# Patient Record
Sex: FEMALE | Race: WHITE | NOT HISPANIC OR LATINO | Employment: FULL TIME | URBAN - METROPOLITAN AREA
[De-identification: names, ages, dates, MRNs, and addresses within clinical notes are randomized per-mention and may not be internally consistent; named-entity substitution may affect disease eponyms.]

---

## 2017-03-13 ENCOUNTER — MEDICAL CORRESPONDENCE (OUTPATIENT)
Dept: HEALTH INFORMATION MANAGEMENT | Facility: CLINIC | Age: 47
End: 2017-03-13

## 2018-02-26 ENCOUNTER — OFFICE VISIT (OUTPATIENT)
Dept: NEUROLOGY | Facility: CLINIC | Age: 48
End: 2018-02-26
Payer: COMMERCIAL

## 2018-02-26 VITALS
HEART RATE: 83 BPM | TEMPERATURE: 98.7 F | OXYGEN SATURATION: 97 % | SYSTOLIC BLOOD PRESSURE: 112 MMHG | DIASTOLIC BLOOD PRESSURE: 73 MMHG | BODY MASS INDEX: 30.33 KG/M2 | WEIGHT: 171.2 LBS | HEIGHT: 63 IN | RESPIRATION RATE: 24 BRPM

## 2018-02-26 DIAGNOSIS — G71.039 LIMB-GIRDLE MUSCULAR DYSTROPHY (H): Primary | ICD-10-CM

## 2018-02-26 PROBLEM — E55.9 VITAMIN D DEFICIENCY: Status: ACTIVE | Noted: 2018-02-26

## 2018-02-26 RX ORDER — BACLOFEN 10 MG/1
10-20 TABLET ORAL 3 TIMES DAILY PRN
COMMUNITY
Start: 2017-04-14 | End: 2020-06-26

## 2018-02-26 RX ORDER — IBUPROFEN 200 MG
1-2 TABLET ORAL EVERY 4 HOURS PRN
COMMUNITY

## 2018-02-26 RX ORDER — LEVOTHYROXINE SODIUM 150 UG/1
175 TABLET ORAL DAILY
COMMUNITY
Start: 2017-08-02 | End: 2022-07-11

## 2018-02-26 RX ORDER — ACYCLOVIR 400 MG/1
400 TABLET ORAL DAILY PRN
COMMUNITY
Start: 2017-04-14 | End: 2022-02-08

## 2018-02-26 ASSESSMENT — PAIN SCALES - GENERAL: PAINLEVEL: NO PAIN (0)

## 2018-02-26 NOTE — MR AVS SNAPSHOT
After Visit Summary   2018    Caryn Elizalde    MRN: [id number]           Patient Information     Date Of Birth          1970        Visit Information        Provider Department      2018 4:30 PM Torey Diaz MD [facility] Neurology        Today's Diagnoses     Limb-girdle muscular dystrophy (H)    -  1       Follow-ups after your visit        Additional Services     PHYSICAL THERAPY REFERRAL       PT Clinician to Evaluate and treat patient in [facility] Clinic.                  Your next 10 appointments already scheduled     2019  4:00 PM CST   (Arrive by 3:45 PM)   Return Muscular Dystrophy with Torey Diaz MD   [facility] Neurology ([facility])    [address] [address] [phone]              Who to contact     Please call your clinic at [phone] to:    Ask questions about your health    Make or cancel appointments    Discuss your medicines    Learn about your test results    Speak to your doctor            Additional Information About Your Visit        MyChart Information     LiveNinja is an electronic gateway that provides easy, online access to your medical records. With LiveNinja, you can request a clinic appointment, read your test results, renew a prescription or communicate with your care team.     To sign up for Admaxim[employer] visit the website at [email or web address]   You will be asked to enter the access code listed below, as well as some personal information. Please follow the directions to create your username and password.     Your access code is: [email or web address]  Expires: 2018  6:30 AM     Your access code will  in 90 days. If you need help or a new code, please contact your [facility] or call [phone] for assistance.        Care EveryWhere ID     This is your Care EveryWhere ID. This could be used by other organizations to access your  "[facility] medical records  [id number]        Your Vitals Were     Pulse Temperature Respirations Height Pulse Oximetry Breastfeeding?    83 98.7  F (37.1  C) (Oral) 24 1.6 m (5' 3\") 97% No    BMI (Body Mass Index)                   30.33 kg/m2            Blood Pressure from Last 3 Encounters:   02/26/18 112/73   10/17/16 111/69   10/12/15 118/53    Weight from Last 3 Encounters:   02/26/18 77.7 kg (171 lb 3.2 oz)   10/17/16 80.9 kg (178 lb 4.8 oz)   10/12/15 74.7 kg (164 lb 11.2 oz)                 Today's Medication Changes          These changes are accurate as of 2/26/18  5:26 PM.  If you have any questions, ask your nurse or doctor.               These medicines have changed or have updated prescriptions.        Dose/Directions    baclofen 10 MG tablet   Commonly known as:  LIORESAL   This may have changed:  Another medication with the same name was removed. Continue taking this medication, and follow the directions you see here.   Changed by:  Torey Diaz MD        Dose:  10-20 mg   Take 10-20 mg by mouth 3 times daily as needed   Refills:  0       levothyroxine 175 MCG tablet   Commonly known as:  SYNTHROID/LEVOTHROID   This may have changed:  Another medication with the same name was removed. Continue taking this medication, and follow the directions you see here.   Changed by:  Torey Diaz MD        Dose:  175 mcg   Take 175 mcg by mouth daily   Refills:  0         Stop taking these medicines if you haven't already. Please contact your care team if you have questions.     ACYCLOVIR   Stopped by:  Torey Diaz MD                    Primary Care Provider Office Phone # Fax #    [facility] [phone]       [address]  [facility] [address]        Equal Access to Services     PARRISH BARROSO AH: Epi keyes Sopeña, waaxda luqadaha, qaybta kaalmada adeegyada, chen cody. So [facility] " [phone].    ATENCIÓN: Si chen hobson, tiene a jeffery disposición servicios gratuitos de asistencia lingüística. Ace fraser [phone].    We comply with applicable federal civil rights laws and [place] laws. We do not discriminate on the basis of race, color, national origin, age, disability, sex, sexual orientation, or gender identity.            Thank you!     Thank you for choosing [facility] NEUROLOGY  for your care. Our goal is always to provide you with excellent care. Hearing back from our patients is one way we can continue to improve our services. Please take a few minutes to complete the written survey that you may receive in the mail after your visit with us. Thank you!             Your Updated Medication List - Protect others around you: Learn how to safely use, store and throw away your medicines at www.disposemymeds.org.          This list is accurate as of 2/26/18  5:26 PM.  Always use your most recent med list.                   Brand Name Dispense Instructions for use Diagnosis    acyclovir 400 MG tablet    ZOVIRAX     Take 400 mg by mouth daily as needed        baclofen 10 MG tablet    LIORESAL     Take 10-20 mg by mouth 3 times daily as needed        ibuprofen 200 MG tablet    ADVIL/MOTRIN     Take 1-2 tablets by mouth every 4 hours as needed        levothyroxine 175 MCG tablet    SYNTHROID/LEVOTHROID     Take 175 mcg by mouth daily        PROBIOTIC & ACIDOPHILUS EX ST PO      Take 1 capsule by mouth daily

## 2018-02-26 NOTE — PROGRESS NOTES
[facility] [place]   Neurology [facility] Clinic Progress Note          Assessment and Plan:    Ms. Elizalde presents with limb-girdle muscular dystrophy, type 2B, ambulatory phase with mild progression but overall function is stable. Her balance has been problematic leading to falls and she has had an increase in significant tension type headaches that may be producing secondary migraines. We will refer her for physical therapy with special attention to balance and core stability strengthening. If physical therapy, and stretching/relaxation techniques do not help her tension type headaches we can discuss further treatment in the future. She has significant nausea and emesis with headaches and rescue medication would best be in the form of injectable or sublingual/dissolvable.     Plan:   -Refer to Physical Therapy  -Follow up in approximately 1 year.     Beulah Dinh DO  Neuromuscular Medicine Fellow    I personally examined this patient, reviewed vital signs and pertinent auxiliary test results.  This note details our findings, impression and plan that we formulated together.    I spent total of 15 minutes face-to-face with Caryn Elizalde during today's visit. Over 50% of this time was spent counseling the patient and coordinating care. See note for details.    Sincerely yours,      Torey Diaz MD  Pediatric Neurology  [phone]              Interval History:   Ms. 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  She continues to be ambulatory but with increased risk of falls.  She reports slight progression of weakness. She does not have a power mobility but uses a cane or a walker. She has balance difficulties. She feels her core is weaker. She is feels this leads to muscle tension in her neck in particular. About once a month she has debilitating headaches due to  "muscle neck tension. She has associated nausea and emesis, she reports preferring a dark and quiet room. She takes ibuprofen, alternates hot and cold packs, and uses tiger balm with eventual improvement of headache. She has noticed quite a bit of fatigue throughout her day.  She continues to work full-time, sometimes working >50 hours per week.  She does have the option of working from home.  She reports no difficulty with swallowing.  She reports no cardiac symptoms.  She reports no respiratory symptoms.     PMHX:   LGMD 2B  Right Facial Nerve Palsy (Congential)         Review of Systems:   The 10 point Review of Systems is negative other than noted in the HPI          Medications:     Current Outpatient Prescriptions   Medication     levothyroxine (SYNTHROID/LEVOTHROID) 175 MCG tablet     baclofen (LIORESAL) 10 MG tablet     acyclovir (ZOVIRAX) 400 MG tablet     ibuprofen (ADVIL/MOTRIN) 200 MG tablet     Probiotic Product (PROBIOTIC & ACIDOPHILUS EX ST PO)     No current facility-administered medications for this visit.              Physical Exam:   /73  Pulse 83  Temp 98.7  F (37.1  C) (Oral)  Resp 24  Ht 1.6 m (5' 3\")  Wt 77.7 kg (171 lb 3.2 oz)  SpO2 97%  Breastfeeding? No  BMI 30.33 kg/m2    Constitutional:   awake, alert, cooperative, no apparent distress, and appears stated age   Respiratory:   No increased work of breathing, no audible wheezing  Musculoskeletal:   No scoliosis, or foot abnormalities  Psych:   Congruent mood and affect, cooperative  Skin:   Mild erythematous changes over the dorsum of the foot and lower legs (recent sun exposure)    Neurologic:   Mental Status- Alert and oriented, normal language and fund of knowledge   CN- left facial nerve weakness. O/w CN grossly intact  Sensory- Intact to PP and vibration distally and proximally throughout  Coordination- no tremor or ataxia  Reflexes- 1+ biceps, otherwise absent  Motor- R/L  SA 4+/4+  EF 4+/4+  EE 4-/4+  Distal hand muscles " full strength  HF 4/4+  KE 2/2  KF 2/2  DF 4+/4-  PF 4-/4-  Gait: Wide-based steppage gait, unsteady.

## 2018-02-26 NOTE — NURSING NOTE
Chief Complaint   Patient presents with     RECHECK     [facility]- MUSCULAR DYSTROPHY, 1 YEAR F/U     Stephen Ott, CMA

## 2018-02-26 NOTE — LETTER
2/26/2018       RE: Caryn Elizalde  [id number]  [place] [address]     Dear Colleague,    Thank you for referring your patient, Caryn Elizalde, to the [facility] NEUROLOGY at [facility]. Please see a copy of my visit note below.    [facility]   Neurology [facility] Progress Note          Assessment and Plan:    Ms. Elizalde presents with limb-girdle muscular dystrophy, type 2B, ambulatory phase with mild progression but overall function is stable. Her balance has been problematic leading to falls and she has had an increase in significant tension type headaches that may be producing secondary migraines. We will refer her for physical therapy with special attention to balance and core stability strengthening. If physical therapy, and stretching/relaxation techniques do not help her tension type headaches we can discuss further treatment in the future. She has significant nausea and emesis with headaches and rescue medication would best be in the form of injectable or sublingual/dissolvable.     Plan:   -Refer to Physical Therapy  -Follow up in approximately 1 year.     Beulah Dinh DO  Neuromuscular Medicine Fellow    I personally examined this patient, reviewed vital signs and pertinent auxiliary test results.  This note details our findings, impression and plan that we formulated together.    I spent total of 15 minutes face-to-face with Caryn Elizalde during today's visit. Over 50% of this time was spent counseling the patient and coordinating care. See note for details.    Sincerely yours,      Torey Diaz MD  Pediatric Neurology  [phone]              Interval History:   Ms. 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  She continues to be ambulatory but with increased risk of falls.  She reports  "slight progression of weakness. She does not have a power mobility but uses a cane or a walker. She has balance difficulties. She feels her core is weaker. She is feels this leads to muscle tension in her neck in particular. About once a month she has debilitating headaches due to muscle neck tension. She has associated nausea and emesis, she reports preferring a dark and quiet room. She takes ibuprofen, alternates hot and cold packs, and uses tiger balm with eventual improvement of headache. She has noticed quite a bit of fatigue throughout her day.  She continues to work full-time, sometimes working >50 hours per week.  She does have the option of working from home.  She reports no difficulty with swallowing.  She reports no cardiac symptoms.  She reports no respiratory symptoms.     PMHX:   LGMD 2B  Right Facial Nerve Palsy (Congential)         Review of Systems:   The 10 point Review of Systems is negative other than noted in the HPI          Medications:     Current Outpatient Prescriptions   Medication     levothyroxine (SYNTHROID/LEVOTHROID) 175 MCG tablet     baclofen (LIORESAL) 10 MG tablet     acyclovir (ZOVIRAX) 400 MG tablet     ibuprofen (ADVIL/MOTRIN) 200 MG tablet     Probiotic Product (PROBIOTIC & ACIDOPHILUS EX ST PO)     No current facility-administered medications for this visit.              Physical Exam:   /73  Pulse 83  Temp 98.7  F (37.1  C) (Oral)  Resp 24  Ht 1.6 m (5' 3\")  Wt 77.7 kg (171 lb 3.2 oz)  SpO2 97%  Breastfeeding? No  BMI 30.33 kg/m2    Constitutional:   awake, alert, cooperative, no apparent distress, and appears stated age   Respiratory:   No increased work of breathing, no audible wheezing  Musculoskeletal:   No scoliosis, or foot abnormalities  Psych:   Congruent mood and affect, cooperative  Skin:   Mild erythematous changes over the dorsum of the foot and lower legs (recent sun exposure)    Neurologic:   Mental Status- Alert and oriented, normal language and " fund of knowledge   CN- left facial nerve weakness. O/w CN grossly intact  Sensory- Intact to PP and vibration distally and proximally throughout  Coordination- no tremor or ataxia  Reflexes- 1+ biceps, otherwise absent  Motor- R/L  SA 4+/4+  EF 4+/4+  EE 4-/4+  Distal hand muscles full strength  HF 4/4+  KE 2/2  KF 2/2  DF 4+/4-  PF 4-/4-  Gait: Wide-based steppage gait, unsteady.            Again, thank you for allowing me to participate in the care of your patient.      Sincerely,    Torey Diaz MD

## 2019-04-24 ENCOUNTER — DOCUMENTATION ONLY (OUTPATIENT)
Dept: CARE COORDINATION | Facility: CLINIC | Age: 49
End: 2019-04-24

## 2019-06-17 ENCOUNTER — OFFICE VISIT (OUTPATIENT)
Dept: NEUROLOGY | Facility: CLINIC | Age: 49
End: 2019-06-17
Payer: COMMERCIAL

## 2019-06-17 ENCOUNTER — RESEARCH ENCOUNTER (OUTPATIENT)
Dept: NEUROLOGY | Facility: CLINIC | Age: 49
End: 2019-06-17

## 2019-06-17 VITALS
BODY MASS INDEX: 33.18 KG/M2 | HEART RATE: 73 BPM | WEIGHT: 187.3 LBS | OXYGEN SATURATION: 96 % | SYSTOLIC BLOOD PRESSURE: 135 MMHG | DIASTOLIC BLOOD PRESSURE: 62 MMHG

## 2019-06-17 DIAGNOSIS — G71.033: Primary | ICD-10-CM

## 2019-06-17 ASSESSMENT — PAIN SCALES - GENERAL: PAINLEVEL: NO PAIN (0)

## 2019-06-17 ASSESSMENT — ENCOUNTER SYMPTOMS
STIFFNESS: 1
NECK PAIN: 1
BACK PAIN: 0
JOINT SWELLING: 0
ARTHRALGIAS: 1
MYALGIAS: 1
MUSCLE CRAMPS: 1
MUSCLE WEAKNESS: 1

## 2019-06-17 NOTE — NURSING NOTE
Chief Complaint   Patient presents with     RECHECK     [facility] RETURN - 1 YEAR FOLLOW UP       Sage Pedroza, EMT

## 2019-06-17 NOTE — PROGRESS NOTES
[facility] [place]   Neurology [facility] Progress Note          Assessment and Plan:    Ms. Elizalde presents with limb-girdle muscular dystrophy, type 2B, ambulatory phase with mild progression but overall function is stable. Her balance has been problematic leading to falls.     We will refer her for physical therapy with special attention to balance and core stability strengthening.    . She has significant nausea and emesis with headaches and rescue medication would best be in the form of injectable or sublingual/dissolvable.     Plan:   -Follow up in approximately 1 year.             Interval History:   Ms. 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  She continues to be ambulatory but with increased risk of falls.  She reports slight progression of weakness. She does not have a power mobility but uses a cane or a walker. She has balance difficulties. She feels her core is weaker. She is feels this leads to muscle tension in her neck in particular. About once a month she has debilitating headaches due to muscle neck tension. She has associated nausea and emesis, she reports preferring a dark and quiet room. She takes ibuprofen, alternates hot and cold packs, and uses tiger balm with eventual improvement of headache. She has noticed quite a bit of fatigue throughout her day.  She continues to work full-time, sometimes working >50 hours per week.  She does have the option of working from home.  She reports no difficulty with swallowing.  She reports no cardiac symptoms.  She reports no respiratory symptoms.       PMHX:   LGMD 2B  Right Facial Nerve Palsy (Congential)         Review of Systems:   The 10 point Review of Systems is negative other than noted in the HPI          Medications:     Current Outpatient Medications   Medication     acyclovir (ZOVIRAX) 400 MG tablet      baclofen (LIORESAL) 10 MG tablet     ibuprofen (ADVIL/MOTRIN) 200 MG tablet     levothyroxine (SYNTHROID/LEVOTHROID) 175 MCG tablet     Probiotic Product (PROBIOTIC & ACIDOPHILUS EX ST PO)     vitamin B complex with vitamin C (VITAMIN  B COMPLEX) tablet     No current facility-administered medications for this visit.              Physical Exam:   /62 (BP Location: Left arm, Patient Position: Sitting, Cuff Size: Adult Large)   Pulse 73   Wt 85 kg (187 lb 4.8 oz)   SpO2 96%   BMI 33.18 kg/m      Constitutional:   awake, alert, cooperative, no apparent distress, and appears stated age   Respiratory:   No increased work of breathing, no audible wheezing  Musculoskeletal:   No scoliosis, or foot abnormalities  Psych:   Congruent mood and affect, cooperative  Skin:   Mild erythematous changes over the dorsum of the foot and lower legs (recent sun exposure)    Neurologic:   Mental Status- Alert and oriented, normal language and fund of knowledge   CN- left facial nerve weakness. O/w CN grossly intact  Sensory- Intact to PP and vibration distally and proximally throughout  Coordination- no tremor or ataxia  Reflexes- 1+ biceps, otherwise absent  Motor- R/L  SA 4+/4+  EF 4+/4+  EE 4-/4+  Distal hand muscles full strength  HF 4/4+  KE 2/2  KF 2/2  DF 4+/4-  PF 4-/4-  Gait: Wide-based steppage gait, unsteady.        Answers for HPI/ROS submitted by the patient on 6/17/2019   General Symptoms: No  Skin Symptoms: No  HENT Symptoms: No  EYE SYMPTOMS: No  HEART SYMPTOMS: No  LUNG SYMPTOMS: No  INTESTINAL SYMPTOMS: No  URINARY SYMPTOMS: No  GYNECOLOGIC SYMPTOMS: No  BREAST SYMPTOMS: No  SKELETAL SYMPTOMS: Yes  BLOOD SYMPTOMS: No  NERVOUS SYSTEM SYMPTOMS: No  MENTAL HEALTH SYMPTOMS: No  Back pain: No  Muscle aches: Yes  Neck pain: Yes  Swollen joints: No  Joint pain: Yes  Bone pain: No  Muscle cramps: Yes  Muscle weakness: Yes  Joint stiffness: Yes  Bone fracture: No

## 2019-06-17 NOTE — PROGRESS NOTES
[facility] Neuromuscular Registry    IRB # [id number]  PI: Carlos Alberto Herron MD, PhD  : Geena Sanchez    Patient was approached for possible participation for the above study. The current approved IRB consent form was discussed and explained to the patient.  It was discussed that involvement with the study is voluntary and refusal to participate would not involve penalty or decrease benefits at which the patient is entitled, and the subject may discontinue his/her involvement at any time without penalty or loss in benefits. We also discussed that this study does not have follow up visits or procedures. Patient was informed that an additional contact might occur if data needed was not found in patient s medical record. The patient was given time to review and ask any questions about the consent. Patient was shown contact information for PI and study staff in consent for future questions. Patient verbalized understanding of consent and study by restating the purpose, procedures, duration, risk, confidentiality of PHI, and voluntarily participation. Patient printed, signed and dated the consent and HIPAA form prior to study involvement. A copy was given to the patient for their records.     Subject Consent/HIPAA : SIGNED ON 06.17.2019

## 2019-06-17 NOTE — LETTER
6/17/2019       RE: Caryn Elizalde  [address]  [place] [address]     Dear Colleague,    Thank you for referring your patient, Caryn Elizalde, to the [facility] NEUROLOGY at [facility]. Please see a copy of my visit note below.                 [facility]                  Muscular Dystrophy Clinic Note     Caryn Elizalde MRN# [id number]   YOB: 1970 Age: 49 year old      Date of Visit: Jun 17, 2019    [facility]      History is obtained from the patient, family and medical record       Interval Change:      Caryn Elizalde is a 49 year old female was seen and examined at the muscular dystrophy clinic on Jun 17, 2019 for evaluation of LGMD type 2B. She previously seen about a year ago. Since her previous visit she reports on some worsening of her symptoms. Since her last visit she started to work from home 1-2 times per week which helps her to conserve energy and minimize musculoskeletal discomfort.  She continues to have some headaches as well.              Allergies:    No Known Allergies          Medications:     Prescription Medications as of 6/17/2019       Rx Number Disp Refills Start End Last Dispensed Date Next Fill Date Owning Pharmacy    acyclovir (ZOVIRAX) 400 MG tablet    4/14/2017        Sig: Take 400 mg by mouth daily as needed    Class: Historical    Route: Oral    baclofen (LIORESAL) 10 MG tablet    4/14/2017        Sig: Take 10-20 mg by mouth 3 times daily as needed    Class: Historical    Route: Oral    ibuprofen (ADVIL/MOTRIN) 200 MG tablet            Sig: Take 1-2 tablets by mouth every 4 hours as needed    Class: Historical    Route: Oral    levothyroxine (SYNTHROID/LEVOTHROID) 175 MCG tablet    8/2/2017        Sig: Take 175 mcg by mouth daily    Class: Historical    Route: Oral    Probiotic Product (PROBIOTIC & ACIDOPHILUS EX ST PO)            Sig:  Take 1 capsule by mouth daily    Class: Historical    Route: Oral    vitamin B complex with vitamin C (VITAMIN  B COMPLEX) tablet            Sig: Take 1 tablet by mouth daily    Class: Historical    Route: Oral                Review of Systems:   Answers for HPI/ROS submitted by the patient on 6/17/2019   General Symptoms: No  Skin Symptoms: No  HENT Symptoms: No  EYE SYMPTOMS: No  HEART SYMPTOMS: No  LUNG SYMPTOMS: No  INTESTINAL SYMPTOMS: No  URINARY SYMPTOMS: No  GYNECOLOGIC SYMPTOMS: No  BREAST SYMPTOMS: No  SKELETAL SYMPTOMS: Yes  BLOOD SYMPTOMS: No  NERVOUS SYSTEM SYMPTOMS: No  MENTAL HEALTH SYMPTOMS: No  Back pain: No  Muscle aches: Yes  Neck pain: Yes  Swollen joints: No  Joint pain: Yes  Bone pain: No  Muscle cramps: Yes  Muscle weakness: Yes  Joint stiffness: Yes  Bone fracture: No           Physical Exam:     /62 (BP Location: Left arm, Patient Position: Sitting, Cuff Size: Adult Large)   Pulse 73   Wt 85 kg (187 lb 4.8 oz)   SpO2 96%   BMI 33.18 kg/m      Physical Exam:   General: NAD  Head: Normocephalic, atraumatic  Eyes: No conjunctival injection, no scleral icterus.  Mouth: No oral lesions, no erythema or exudate in the oropharynx  Respiratory: No increased work of breathing  Cardiovascular: No lower extremity edema  Abdomen: Soft, non-tender, without organomegaly.  Extremities: Warm, dry  Neurologic:   Mental Status- Alert and oriented, normal language and fund of knowledge   CN- left facial nerve weakness. O/w CN grossly intact  Sensory- Intact to PP and vibration distally and proximally throughout  Coordination- no tremor or ataxia  Reflexes- 1+ biceps, otherwise absent  Motor- R/L  SA 4+/4+  EF 4+/4+  EE 4-/4+  Distal hand muscles full strength  HF 4/4+  KE 2/2  KF 2/2  DF 4+/4-  PF 4-/4-  Gait: Wide-based steppage gait, unsteady.              Assessment and Recommendations:   Caryn Elizalde is a 49 year old woman who presents with limb-girdle muscular dystrophy, type 2B,  ambulatory phase with ongoing progression but overall function is stable.She continues to have issues related to balance increased risk for falls.       Recommendations:  - Continue current course.  Return to clinic in 1 year.     I spent total of 15 minutes in face-to-face during today's visit. Over 50% of this time was spent counseling the patient and coordinating care. See note for details.    Torey Diaz MD  [phone]       CC  Patient Care Team:  [facility], [facility] as PCP - General  SELF, REFERRED    Copy to patient  FERMIN MARTIN  [address] [address]

## 2019-06-18 NOTE — PROGRESS NOTES
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 She previously seen about a year ago. Since her previous visit she reports on some worsening of her symptoms. Since her last visit she started to work from home 1-2 times per week which helps her to conserve energy and minimize musculoskeletal discomfort.  She continues to have some headaches as well.              Allergies:    No Known Allergies          Medications:     Prescription Medications as of 6/17/2019       Rx Number Disp Refills Start End Last Dispensed Date Next Fill Date Owning Pharmacy    acyclovir (ZOVIRAX) 400 MG tablet    4/14/2017        Sig: Take 400 mg by mouth daily as needed    Class: Historical    Route: Oral    baclofen (LIORESAL) 10 MG tablet    4/14/2017        Sig: Take 10-20 mg by mouth 3 times daily as needed    Class: Historical    Route: Oral    ibuprofen (ADVIL/MOTRIN) 200 MG tablet            Sig: Take 1-2 tablets by mouth every 4 hours as needed    Class: Historical    Route: Oral    levothyroxine (SYNTHROID/LEVOTHROID) 175 MCG tablet    8/2/2017        Sig: Take 175 mcg by mouth daily    Class: Historical    Route: Oral    Probiotic Product (PROBIOTIC & ACIDOPHILUS EX ST PO)            Sig: Take 1 capsule by mouth daily    Class: Historical    Route: Oral    vitamin B complex with vitamin C (VITAMIN  B COMPLEX) tablet            Sig: Take 1 tablet by mouth daily    Class: Historical    Route: Oral                Review of Systems:   Answers for HPI/ROS submitted by the  patient on 6/17/2019   General Symptoms: No  Skin Symptoms: No  HENT Symptoms: No  EYE SYMPTOMS: No  HEART SYMPTOMS: No  LUNG SYMPTOMS: No  INTESTINAL SYMPTOMS: No  URINARY SYMPTOMS: No  GYNECOLOGIC SYMPTOMS: No  BREAST SYMPTOMS: No  SKELETAL SYMPTOMS: Yes  BLOOD SYMPTOMS: No  NERVOUS SYSTEM SYMPTOMS: No  MENTAL HEALTH SYMPTOMS: No  Back pain: No  Muscle aches: Yes  Neck pain: Yes  Swollen joints: No  Joint pain: Yes  Bone pain: No  Muscle cramps: Yes  Muscle weakness: Yes  Joint stiffness: Yes  Bone fracture: No           Physical Exam:     /62 (BP Location: Left arm, Patient Position: Sitting, Cuff Size: Adult Large)   Pulse 73   Wt 85 kg (187 lb 4.8 oz)   SpO2 96%   BMI 33.18 kg/m     Physical Exam:   General: NAD  Head: Normocephalic, atraumatic  Eyes: No conjunctival injection, no scleral icterus.  Mouth: No oral lesions, no erythema or exudate in the oropharynx  Respiratory: No increased work of breathing  Cardiovascular: No lower extremity edema  Abdomen: Soft, non-tender, without organomegaly.  Extremities: Warm, dry  Neurologic:   Mental Status- Alert and oriented, normal language and fund of knowledge   CN- left facial nerve weakness. O/w CN grossly intact  Sensory- Intact to PP and vibration distally and proximally throughout  Coordination- no tremor or ataxia  Reflexes- 1+ biceps, otherwise absent  Motor- R/L  SA 4+/4+  EF 4+/4+  EE 4-/4+  Distal hand muscles full strength  HF 4/4+  KE 2/2  KF 2/2  DF 4+/4-  PF 4-/4-  Gait: Wide-based steppage gait, unsteady.              Assessment and Recommendations:   Caryn Elizalde is a 49 year old woman who presents with limb-girdle muscular dystrophy, type 2B, ambulatory phase with ongoing progression but overall function is stable.She continues to have issues related to balance increased risk for falls.       Recommendations:  - Continue current course.  Return to clinic in 1 year.     I spent total of 15 minutes in face-to-face during today's  visit. Over 50% of this time was spent counseling the patient and coordinating care. See note for details.    Torey Diaz MD  [phone]       CC  Patient Care Team:  Clinic, [facility] as PCP - General  SELF, REFERRED    Copy to patient  FERMIN MARTIN  [address] [place] [id number]

## 2019-07-29 ENCOUNTER — OFFICE VISIT - HEALTHEAST (OUTPATIENT)
Dept: FAMILY MEDICINE | Facility: CLINIC | Age: 49
End: 2019-07-29

## 2019-07-29 ENCOUNTER — COMMUNICATION - HEALTHEAST (OUTPATIENT)
Dept: FAMILY MEDICINE | Facility: CLINIC | Age: 49
End: 2019-07-29

## 2019-07-29 DIAGNOSIS — Z13.29 SCREENING FOR ENDOCRINE, NUTRITIONAL, METABOLIC AND IMMUNITY DISORDER: ICD-10-CM

## 2019-07-29 DIAGNOSIS — K76.0 FATTY LIVER: ICD-10-CM

## 2019-07-29 DIAGNOSIS — G43.829 MENSTRUAL MIGRAINE WITHOUT STATUS MIGRAINOSUS, NOT INTRACTABLE: ICD-10-CM

## 2019-07-29 DIAGNOSIS — Z13.21 SCREENING FOR ENDOCRINE, NUTRITIONAL, METABOLIC AND IMMUNITY DISORDER: ICD-10-CM

## 2019-07-29 DIAGNOSIS — Z13.228 SCREENING FOR ENDOCRINE, NUTRITIONAL, METABOLIC AND IMMUNITY DISORDER: ICD-10-CM

## 2019-07-29 DIAGNOSIS — G71.039 LIMB-GIRDLE MUSCULAR DYSTROPHY (H): ICD-10-CM

## 2019-07-29 DIAGNOSIS — Z11.4 SCREENING FOR HIV WITHOUT PRESENCE OF RISK FACTORS: ICD-10-CM

## 2019-07-29 DIAGNOSIS — R74.01 ELEVATED TRANSAMINASE MEASUREMENT: ICD-10-CM

## 2019-07-29 DIAGNOSIS — B00.9 HERPES SIMPLEX TYPE 1 INFECTION: ICD-10-CM

## 2019-07-29 DIAGNOSIS — E03.9 HYPOTHYROIDISM, UNSPECIFIED TYPE: ICD-10-CM

## 2019-07-29 DIAGNOSIS — Z13.0 SCREENING FOR ENDOCRINE, NUTRITIONAL, METABOLIC AND IMMUNITY DISORDER: ICD-10-CM

## 2019-07-29 DIAGNOSIS — Z00.00 VISIT FOR PREVENTIVE HEALTH EXAMINATION: ICD-10-CM

## 2019-07-29 LAB
ALBUMIN SERPL-MCNC: 4.1 G/DL (ref 3.5–5)
ALP SERPL-CCNC: 63 U/L (ref 45–120)
ALT SERPL W P-5'-P-CCNC: 74 U/L (ref 0–45)
ANION GAP SERPL CALCULATED.3IONS-SCNC: 9 MMOL/L (ref 5–18)
AST SERPL W P-5'-P-CCNC: 62 U/L (ref 0–40)
BASOPHILS # BLD AUTO: 0 THOU/UL (ref 0–0.2)
BASOPHILS NFR BLD AUTO: 1 % (ref 0–2)
BILIRUB SERPL-MCNC: 0.3 MG/DL (ref 0–1)
BUN SERPL-MCNC: 9 MG/DL (ref 8–22)
CALCIUM SERPL-MCNC: 9.5 MG/DL (ref 8.5–10.5)
CHLORIDE BLD-SCNC: 105 MMOL/L (ref 98–107)
CHOLEST SERPL-MCNC: 182 MG/DL
CO2 SERPL-SCNC: 24 MMOL/L (ref 22–31)
CREAT SERPL-MCNC: 0.57 MG/DL (ref 0.6–1.1)
EOSINOPHIL # BLD AUTO: 0.1 THOU/UL (ref 0–0.4)
EOSINOPHIL NFR BLD AUTO: 2 % (ref 0–6)
ERYTHROCYTE [DISTWIDTH] IN BLOOD BY AUTOMATED COUNT: 11.4 % (ref 11–14.5)
FASTING STATUS PATIENT QL REPORTED: NORMAL
GFR SERPL CREATININE-BSD FRML MDRD: >60 ML/MIN/1.73M2
GLUCOSE BLD-MCNC: 84 MG/DL (ref 70–125)
HBA1C MFR BLD: 5 % (ref 3.5–6)
HCT VFR BLD AUTO: 40.8 % (ref 35–47)
HDLC SERPL-MCNC: 68 MG/DL
HGB BLD-MCNC: 14.1 G/DL (ref 12–16)
HIV 1+2 AB+HIV1 P24 AG SERPL QL IA: NEGATIVE
LDLC SERPL CALC-MCNC: 103 MG/DL
LYMPHOCYTES # BLD AUTO: 2.3 THOU/UL (ref 0.8–4.4)
LYMPHOCYTES NFR BLD AUTO: 42 % (ref 20–40)
MCH RBC QN AUTO: 33 PG (ref 27–34)
MCHC RBC AUTO-ENTMCNC: 34.6 G/DL (ref 32–36)
MCV RBC AUTO: 95 FL (ref 80–100)
MONOCYTES # BLD AUTO: 0.2 THOU/UL (ref 0–0.9)
MONOCYTES NFR BLD AUTO: 4 % (ref 2–10)
NEUTROPHILS # BLD AUTO: 2.7 THOU/UL (ref 2–7.7)
NEUTROPHILS NFR BLD AUTO: 51 % (ref 50–70)
PLATELET # BLD AUTO: 369 THOU/UL (ref 140–440)
PMV BLD AUTO: 6.8 FL (ref 7–10)
POTASSIUM BLD-SCNC: 4.3 MMOL/L (ref 3.5–5)
PROT SERPL-MCNC: 6.7 G/DL (ref 6–8)
RBC # BLD AUTO: 4.28 MILL/UL (ref 3.8–5.4)
SODIUM SERPL-SCNC: 138 MMOL/L (ref 136–145)
T3 SERPL-MCNC: 62 NG/DL (ref 45–175)
T4 FREE SERPL-MCNC: 1.2 NG/DL (ref 0.7–1.8)
TRIGL SERPL-MCNC: 56 MG/DL
TSH SERPL DL<=0.005 MIU/L-ACNC: 4.65 UIU/ML (ref 0.3–5)
WBC: 5.4 THOU/UL (ref 4–11)

## 2019-07-29 ASSESSMENT — MIFFLIN-ST. JEOR: SCORE: 1423.38

## 2019-07-30 LAB
HCV AB SERPL QL IA: NEGATIVE
THYROID PEROXIDASE ANTIBODIES - HISTORICAL: 1617.2 IU/ML (ref 0–5.6)

## 2019-07-31 ENCOUNTER — COMMUNICATION - HEALTHEAST (OUTPATIENT)
Dept: FAMILY MEDICINE | Facility: CLINIC | Age: 49
End: 2019-07-31

## 2019-08-05 ENCOUNTER — COMMUNICATION - HEALTHEAST (OUTPATIENT)
Dept: FAMILY MEDICINE | Facility: CLINIC | Age: 49
End: 2019-08-05

## 2019-08-05 DIAGNOSIS — G43.829 MENSTRUAL MIGRAINE WITHOUT STATUS MIGRAINOSUS, NOT INTRACTABLE: ICD-10-CM

## 2019-10-15 ENCOUNTER — AMBULATORY - HEALTHEAST (OUTPATIENT)
Dept: MULTI SPECIALTY CLINIC | Facility: CLINIC | Age: 49
End: 2019-10-15

## 2019-10-15 LAB — PAP SMEAR - HIM PATIENT REPORTED: NORMAL

## 2019-12-23 ENCOUNTER — COMMUNICATION - HEALTHEAST (OUTPATIENT)
Dept: FAMILY MEDICINE | Facility: CLINIC | Age: 49
End: 2019-12-23

## 2019-12-23 DIAGNOSIS — B00.9 HERPES SIMPLEX TYPE 1 INFECTION: ICD-10-CM

## 2019-12-23 DIAGNOSIS — G43.829 MENSTRUAL MIGRAINE WITHOUT STATUS MIGRAINOSUS, NOT INTRACTABLE: ICD-10-CM

## 2020-02-17 ENCOUNTER — COMMUNICATION - HEALTHEAST (OUTPATIENT)
Dept: SCHEDULING | Facility: CLINIC | Age: 50
End: 2020-02-17

## 2020-02-17 DIAGNOSIS — G43.829 MENSTRUAL MIGRAINE WITHOUT STATUS MIGRAINOSUS, NOT INTRACTABLE: ICD-10-CM

## 2020-03-05 ENCOUNTER — OFFICE VISIT - HEALTHEAST (OUTPATIENT)
Dept: FAMILY MEDICINE | Facility: CLINIC | Age: 50
End: 2020-03-05

## 2020-03-05 DIAGNOSIS — H72.92 ACUTE OTITIS MEDIA OF LEFT EAR WITH PERFORATION: ICD-10-CM

## 2020-03-05 DIAGNOSIS — H66.92 ACUTE OTITIS MEDIA OF LEFT EAR WITH PERFORATION: ICD-10-CM

## 2020-03-05 DIAGNOSIS — Z96.22 HISTORY OF TYMPANOSTOMY TUBE PLACEMENT: ICD-10-CM

## 2020-03-05 DIAGNOSIS — G51.0 FACIAL PALSY: ICD-10-CM

## 2020-03-05 ASSESSMENT — MIFFLIN-ST. JEOR: SCORE: 1412.89

## 2020-03-18 ENCOUNTER — RECORDS - HEALTHEAST (OUTPATIENT)
Dept: LAB | Facility: HOSPITAL | Age: 50
End: 2020-03-18

## 2020-03-18 LAB
BASOPHILS # BLD AUTO: 0.1 THOU/UL (ref 0–0.2)
BASOPHILS NFR BLD AUTO: 1 % (ref 0–2)
CK SERPL-CCNC: 1643 U/L (ref 30–190)
EOSINOPHIL # BLD AUTO: 0.1 THOU/UL (ref 0–0.4)
EOSINOPHIL NFR BLD AUTO: 2 % (ref 0–6)
ERYTHROCYTE [DISTWIDTH] IN BLOOD BY AUTOMATED COUNT: 12.9 % (ref 11–14.5)
ERYTHROCYTE [SEDIMENTATION RATE] IN BLOOD BY WESTERGREN METHOD: 12 MM/HR (ref 0–20)
HCT VFR BLD AUTO: 41.2 % (ref 35–47)
HGB BLD-MCNC: 14.4 G/DL (ref 12–16)
IRON SERPL-MCNC: 115 UG/DL (ref 42–175)
LYMPHOCYTES # BLD AUTO: 1.9 THOU/UL (ref 0.8–4.4)
LYMPHOCYTES NFR BLD AUTO: 34 % (ref 20–40)
MCH RBC QN AUTO: 32.2 PG (ref 27–34)
MCHC RBC AUTO-ENTMCNC: 35 G/DL (ref 32–36)
MCV RBC AUTO: 92 FL (ref 80–100)
MONOCYTES # BLD AUTO: 0.4 THOU/UL (ref 0–0.9)
MONOCYTES NFR BLD AUTO: 7 % (ref 2–10)
NEUTROPHILS # BLD AUTO: 3 THOU/UL (ref 2–7.7)
NEUTROPHILS NFR BLD AUTO: 56 % (ref 50–70)
PLATELET # BLD AUTO: 395 THOU/UL (ref 140–440)
PMV BLD AUTO: 9.3 FL (ref 8.5–12.5)
RBC # BLD AUTO: 4.47 MILL/UL (ref 3.8–5.4)
TSH SERPL DL<=0.005 MIU/L-ACNC: 3.63 UIU/ML (ref 0.3–5)
WBC: 5.5 THOU/UL (ref 4–11)

## 2020-03-31 ENCOUNTER — COMMUNICATION - HEALTHEAST (OUTPATIENT)
Dept: FAMILY MEDICINE | Facility: CLINIC | Age: 50
End: 2020-03-31

## 2020-03-31 DIAGNOSIS — B00.9 HERPES SIMPLEX TYPE 1 INFECTION: ICD-10-CM

## 2020-05-10 ENCOUNTER — COMMUNICATION - HEALTHEAST (OUTPATIENT)
Dept: FAMILY MEDICINE | Facility: CLINIC | Age: 50
End: 2020-05-10

## 2020-05-10 DIAGNOSIS — Z13.228 SCREENING FOR ENDOCRINE, NUTRITIONAL, METABOLIC AND IMMUNITY DISORDER: ICD-10-CM

## 2020-05-10 DIAGNOSIS — Z13.0 SCREENING FOR ENDOCRINE, NUTRITIONAL, METABOLIC AND IMMUNITY DISORDER: ICD-10-CM

## 2020-05-10 DIAGNOSIS — E03.9 HYPOTHYROIDISM, UNSPECIFIED TYPE: ICD-10-CM

## 2020-05-10 DIAGNOSIS — Z13.29 SCREENING FOR ENDOCRINE, NUTRITIONAL, METABOLIC AND IMMUNITY DISORDER: ICD-10-CM

## 2020-05-10 DIAGNOSIS — Z13.21 SCREENING FOR ENDOCRINE, NUTRITIONAL, METABOLIC AND IMMUNITY DISORDER: ICD-10-CM

## 2020-06-01 ENCOUNTER — HOSPITAL ENCOUNTER (OUTPATIENT)
Dept: MAMMOGRAPHY | Facility: CLINIC | Age: 50
Discharge: HOME | End: 2020-06-01
Attending: FAMILY MEDICINE

## 2020-06-01 DIAGNOSIS — Z12.31 VISIT FOR SCREENING MAMMOGRAM: ICD-10-CM

## 2020-06-02 ENCOUNTER — RECORDS - HEALTHEAST (OUTPATIENT)
Dept: RADIOLOGY | Facility: CLINIC | Age: 50
End: 2020-06-02

## 2020-06-02 ENCOUNTER — RECORDS - HEALTHEAST (OUTPATIENT)
Dept: ADMINISTRATIVE | Facility: OTHER | Age: 50
End: 2020-06-02

## 2020-06-24 ENCOUNTER — TELEPHONE (OUTPATIENT)
Dept: NEUROLOGY | Facility: CLINIC | Age: 50
End: 2020-06-24

## 2020-06-24 NOTE — TELEPHONE ENCOUNTER
Topiramate 25mg tabs 2 in the AM and 2 HS (started in march) she is having side affects which include constipation 8-9 day spurts and then follows with diarrhea for several days, along with seriously difficulty sleeping. States she can never feel like its a deep sleep and has gotten worse and worse. Last menstrual cycle created more migraines even at this current dose. She is wondering how to taper. Appt scheduled.    Elias Granados on 6/24/2020 at 10:37 AM

## 2020-06-24 NOTE — TELEPHONE ENCOUNTER
Pt lm re medication side effects and possible change. She is due for a follow up. I called patient, and had her schedule a visit with .

## 2020-06-25 NOTE — TELEPHONE ENCOUNTER
Please call, She can decrease topiramate to 1 tab BID for 3 days, then one tab once a day until our appt on 7-1-20. Thanks.

## 2020-06-26 VITALS — WEIGHT: 167.4 LBS | HEIGHT: 64 IN | BODY MASS INDEX: 28.58 KG/M2

## 2020-06-26 PROBLEM — R26.9 GAIT ABNORMALITY: Status: ACTIVE | Noted: 2018-12-24

## 2020-06-26 PROBLEM — D12.6 ADENOMATOUS POLYP OF COLON: Status: ACTIVE | Noted: 2018-08-07

## 2020-06-26 PROBLEM — G43.719 INTRACTABLE CHRONIC MIGRAINE WITHOUT AURA: Status: ACTIVE | Noted: 2020-06-26

## 2020-06-26 PROBLEM — G43.829 MENSTRUAL MIGRAINE WITHOUT STATUS MIGRAINOSUS, NOT INTRACTABLE: Status: ACTIVE | Noted: 2019-07-29

## 2020-06-26 PROBLEM — Z72.0 TOBACCO USER: Status: ACTIVE | Noted: 2018-08-07

## 2020-06-26 PROBLEM — E66.9 OBESITY: Status: ACTIVE | Noted: 2020-06-26

## 2020-06-26 PROBLEM — B00.9 HERPES SIMPLEX TYPE 1 INFECTION: Status: ACTIVE | Noted: 2019-07-29

## 2020-06-26 RX ORDER — RIZATRIPTAN BENZOATE 10 MG/1
1 TABLET ORAL PRN
COMMUNITY
Start: 2020-06-12 | End: 2020-07-01

## 2020-06-26 RX ORDER — TOPIRAMATE 25 MG/1
50 TABLET, FILM COATED ORAL 2 TIMES DAILY
COMMUNITY
Start: 2020-03-26 | End: 2021-09-23

## 2020-06-26 ASSESSMENT — MIFFLIN-ST. JEOR: SCORE: 1351.38

## 2020-06-26 NOTE — TELEPHONE ENCOUNTER
Spoke with patient and relayed message. She verbalized understanding with no further questions   Elias Granados on 6/26/2020 at 2:26 PM

## 2020-06-26 NOTE — NURSING NOTE
Chief Complaint   Patient presents with     Follow Up     Discuss migraines and possible medication changes (EMR)      Video visit [email or web address]  Elais Granados on 6/26/2020 at 2:34 PM

## 2020-07-01 ENCOUNTER — OFFICE VISIT (OUTPATIENT)
Dept: NEUROLOGY | Facility: CLINIC | Age: 50
End: 2020-07-01
Payer: COMMERCIAL

## 2020-07-01 DIAGNOSIS — G43.719 INTRACTABLE CHRONIC MIGRAINE WITHOUT AURA AND WITHOUT STATUS MIGRAINOSUS: Primary | ICD-10-CM

## 2020-07-01 PROCEDURE — 99213 OFFICE O/P EST LOW 20 MIN: CPT | Mod: 95 | Performed by: PSYCHIATRY & NEUROLOGY

## 2020-07-01 RX ORDER — RIZATRIPTAN BENZOATE 10 MG/1
10 TABLET ORAL PRN
Qty: 12 TABLET | Refills: 11 | Status: SHIPPED | OUTPATIENT
Start: 2020-07-01 | End: 2021-08-16

## 2020-07-01 NOTE — PATIENT INSTRUCTIONS
Patient Education     About Migraine Headaches  What is a migraine headache?  A migraine is a special kind of headache. It can last a for hours or days. It may cause intense pain. You may also feel sick to your stomach or have eye problems.  What causes it?  We don't know the exact cause. They may be caused by a problem with blood flow to the brain. Or they may happen when brain chemicals don't stay balanced. You are more likely to get a migraine when:    You are under stress    You are tired    You eat some kinds of foods, such as wine, cheese, chocolate or chemicals added to foods    You are around bright lights  Women are more likely to have migraines than men. Sometimes the headaches happen around the time a woman has her period. Or they may happen when a woman is taking hormone pills.   Migraines can run in families.  What are symptoms?  Before a migraine, you may:    Not feel well    Lose part of your vision    See bright spots    See zigzags in front of your eyes  Most of the time, these problems go away when the headache starts. When you have a migraine, you may:    Have a headache that throbs or pounds (you may feel the pain more on one side of your head, or your whole head may hurt)    Be very sensitive to light    Have blurry vision    Vomit (throw up) or have nausea (feel sick to your stomach)    Have numbness or tingling in your face or arm  How is it diagnosed?  Your care team will ask you about your symptoms and medical history. They will examine you.   It may help to keep a headache diary. You should write down:    The date and time of each headache    How long it lasts    The type of pain (is it dull or sharp? Does it throb? Do you feel pressure?)    Where it hurts (for example, scalp, eyes, temples, neck)    What symptoms you had before the headache started    What you ate and drank before the headache    If you used cigarettes, caffeine, alcohol or soda    What time you went to bed the night  before, and what time you woke up that day    If you are a woman, you should also note if you are using birth control pills or taking other female hormones  If you just recently started having headaches, your care team may suggest tests to look for the causes of your symptoms. You may need a brain scan or MRI.  How is it treated?    You may need to take medicines to keep migraines from getting worse once they start. Take these medicines as soon as you can when you start to have signs of a migraine.    You may need to take another medicine every day to stop migraines from coming so often. The medicine can help prevent very bad migraines.    You may need to try a medicine for a few weeks to see if it works. Be sure to keep your follow-up appointments with your care team to see if a medicine is working and what you should try next. Talk to your care team about what is best for you. You may have many choices.  How long will it last?  The headache may last from a few hours to a few days. You may get migraines for the rest of your life. Most of the time, migraines happen less often as you get older.  How can I take care of myself?  As soon as the migraine starts:    Take a pain reliever. Ask your care team what medicine you should take.    Rest in a quiet, dark room until you start to feel better.    Don't drive a car while you have a migraine.    See your care team if your headaches get worse or if they don't get better when you take medicine for them. It may take a few visits to find the best way to control your headaches.  How can I prevent migraines?    Eat regular, healthy meals. Don't go too long without eating. Stay away from foods that seem to cause headaches. Watch out for:  ? Wine, chandan and beer  ? Cheese  ? Aged, canned and processed meats  ? Bread made with yeast  ? Foods with cheese, chocolate or nuts    Don't use medicines that can cause headaches. Ask your care team about this. You may need to stop using  birth control or hormone pills.    Don't smoke cigarettes    Get plenty of sleep every day    Lower your stress. Find time to relax, rest and have fun in your life.  When should I call my care team?  Call your care team right away if you have symptoms that you don't usually get with migraines or you have other unusual symptoms, such as:    Problems talking or your speech is slurred    A weak arm or leg that you can't move in a normal way    A fever higher than 100 F (37.8 C)    Stiff neck    Vomiting that lasts for a few hours  For more information  National Headache Foundation (NHF)  www.headaches.org.  For informational purposes only. Not to replace the advice of your health care provider.   Copyright   2011 [employer]. All rights reserved. Clinically reviewed by [facility]. [employer] 190857 - 08/18.  For informational purposes only. Not to replace the advice of your health care provider.  Copyright   2018 [employer]. All rights reserved.

## 2020-07-01 NOTE — PROGRESS NOTES
[facility]  [place]    Caryn Elizalde MRN# [id number]   Age: 50 year old YOB: 1970               Assessment and Plan:   Assessment:   Migraine headaches  Side effects from topiramate        Plan:   I renewed the rizatriptan prescription at 10 mg per dose.  She will continue using that as abortive therapy and see how the next couple months ago.  I gave her the okay to use ibuprofen or Excedrin in addition to the rizatriptan.  She has not enthusiastic about starting a new preventative medication at this time.  We did discuss the options of Depakote or 1 of the new CGRP antibody medications.  She will follow-up with us in a couple months if she would like to try different preventative medication.               Chief Complaint/HPI:     I spoke to Caryn for a telephone follow-up today with her permission.  We were unable to establish a video link through the ironically named [employer] system.  I first met Caryn back in March.  At that time she was having significant migraine headaches.  MRI of the brain with and without contrast was unremarkable.  I increased her rizatriptan to 10 mg per dose.  This did prove more effective than the 5 mg dose.  I also started her on topiramate.  Unfortunately she developed significant side effects from that including bowel problems (constipation alternating with diarrhea), difficulty sleeping, severe paresthesias.  These side effects persisted despite drinking a lot of water.  About a week ago I gave her instructions for tapering down on the topiramate.  She is now down to 25 mg once a day and already is feeling significantly better though not quite back to 100%.  The topiramate was effective for preventing the migraine headaches.  She would occasionally get a lesser headache.  The migraines have tended to cluster around her menstrual cycle.          Past Medical History:    has a past medical history of Herpes, Hypothyroidism, and Migraines.           Past Surgical History:    has a past surgical history that includes biopsy and GYN surgery.          Social History:     Social History     Tobacco Use     Smoking status: Current Every Day Smoker     Packs/day: 0.50     Years: 37.00     Pack years: 18.50     Types: Cigarettes     Start date: 11/17/2015     Smokeless tobacco: Never Used   Substance Use Topics     Alcohol use: Yes     Alcohol/week: 1.0 standard drinks     Types: 1 Standard drinks or equivalent per week     Comment: MONTHLY             Family History:     Family History   Problem Relation Age of Onset     No Known Problems Mother      Alcoholism Father                 Allergies:     Allergies   Allergen Reactions     Amoxicillin Rash     Other reaction(s): hives             Medications:     Current Outpatient Medications:      ibuprofen (ADVIL/MOTRIN) 200 MG tablet, Take 1-2 tablets by mouth every 4 hours as needed, Disp: , Rfl:      levothyroxine (SYNTHROID/LEVOTHROID) 150 MCG tablet, Take 175 mcg by mouth daily , Disp: , Rfl:      Probiotic Product (PROBIOTIC & ACIDOPHILUS EX ST PO), Take 1 capsule by mouth daily, Disp: , Rfl:      rizatriptan (MAXALT) 10 MG tablet, Take 1 tablet (10 mg) by mouth as needed for migraine May repeat after 2 hours if needed, maximum 2 doses in 24 hours., Disp: 12 tablet, Rfl: 11     topiramate (TOPAMAX) 25 MG tablet, Take 50 mg by mouth 2 times daily, Disp: , Rfl:      vitamin B complex with vitamin C (VITAMIN  B COMPLEX) tablet, Take 1 tablet by mouth daily, Disp: , Rfl:      acyclovir (ZOVIRAX) 400 MG tablet, Take 400 mg by mouth daily as needed, Disp: , Rfl:            Review of Systems:   No difficulty breathing or swallowing, no double vision             Physical Exam:   Awake and alert with no aphasia no dysarthria  Speech is clear and coherent  Attention is fine              Data:       EXAM: MRI HEAD W/O and WITH CONTRAST  LOCATION: [facility]  DATE/TIME: 3/23/2020 8:15  AM    IMPRESSION:  1.  No mass, hemorrhage, or recent infarct identified.  2.  Left mastoid effusion.        23 minutes were spent on the phone today.    Torey Maldonado MD

## 2020-07-01 NOTE — LETTER
7/1/2020         RE: Caryn Elizalde  [address]  [place] [address]        Dear Colleague,    Thank you for referring your patient, Caryn Elizalde, to the [facility] NEUROLOGY [place]. Please see a copy of my visit note below.    [facility]  [place]    Caryn Elizalde MRN# [id number]   Age: 50 year old YOB: 1970               Assessment and Plan:   Assessment:   Migraine headaches  Side effects from topiramate        Plan:   I renewed the rizatriptan prescription at 10 mg per dose.  She will continue using that as abortive therapy and see how the next couple months ago.  She has not enthusiastic about starting a new preventative medication at this time.  We did discuss the options of Depakote or 1 of the new CGRP antibody medications.               Chief Complaint/HPI:     I spoke to Caryn for a telephone follow-up today with her permission.  We were unable to establish a video link through the ironically named [employer] system.  I first met Caryn back in March.  At that time she was having significant migraine headaches.  MRI of the brain with and without contrast was unremarkable.  I increased her rizatriptan to 10 mg per dose.  This did prove more effective than the 5 mg dose.  I also started her on topiramate.  Unfortunately she developed significant side effects from that including bowel problems (constipation alternating with diarrhea), difficulty sleeping, severe paresthesias.  These side effects persisted despite drinking a lot of water.  About a week ago I gave her instructions for tapering down on the topiramate.  She is now down to 25 mg once a day and already is feeling significantly better though not quite back to 100%.  The topiramate was effective for preventing the migraine headaches.  She would occasionally get a lesser headache.  The migraines have tended to cluster around her menstrual cycle.          Past Medical History:     has a past medical history of Herpes, Hypothyroidism, and Migraines.          Past Surgical History:    has a past surgical history that includes biopsy and GYN surgery.          Social History:     Social History     Tobacco Use     Smoking status: Current Every Day Smoker     Packs/day: 0.50     Years: 37.00     Pack years: 18.50     Types: Cigarettes     Start date: 11/17/2015     Smokeless tobacco: Never Used   Substance Use Topics     Alcohol use: Yes     Alcohol/week: 1.0 standard drinks     Types: 1 Standard drinks or equivalent per week     Comment: MONTHLY             Family History:     Family History   Problem Relation Age of Onset     No Known Problems Mother      Alcoholism Father                 Allergies:     Allergies   Allergen Reactions     Amoxicillin Rash     Other reaction(s): hives             Medications:     Current Outpatient Medications:      ibuprofen (ADVIL/MOTRIN) 200 MG tablet, Take 1-2 tablets by mouth every 4 hours as needed, Disp: , Rfl:      levothyroxine (SYNTHROID/LEVOTHROID) 150 MCG tablet, Take 175 mcg by mouth daily , Disp: , Rfl:      Probiotic Product (PROBIOTIC & ACIDOPHILUS EX ST PO), Take 1 capsule by mouth daily, Disp: , Rfl:      rizatriptan (MAXALT) 10 MG tablet, Take 1 tablet (10 mg) by mouth as needed for migraine May repeat after 2 hours if needed, maximum 2 doses in 24 hours., Disp: 12 tablet, Rfl: 11     topiramate (TOPAMAX) 25 MG tablet, Take 50 mg by mouth 2 times daily, Disp: , Rfl:      vitamin B complex with vitamin C (VITAMIN  B COMPLEX) tablet, Take 1 tablet by mouth daily, Disp: , Rfl:      acyclovir (ZOVIRAX) 400 MG tablet, Take 400 mg by mouth daily as needed, Disp: , Rfl:            Review of Systems:   No difficulty breathing or swallowing, no double vision             Physical Exam:   Awake and alert with no aphasia no dysarthria  Speech is clear and coherent  Attention is fine              Data:       EXAM: MRI HEAD W/O and WITH CONTRAST  LOCATION:  [facility]  DATE/TIME: 3/23/2020 8:15 AM    IMPRESSION:  1.  No mass, hemorrhage, or recent infarct identified.  2.  Left mastoid effusion.        23 minutes were spent on the phone today.    Torey Maldonado MD             Again, thank you for allowing me to participate in the care of your patient.        Sincerely,        Torey Maldonado MD

## 2020-07-13 ENCOUNTER — MYC MEDICAL ADVICE (OUTPATIENT)
Dept: NEUROLOGY | Facility: CLINIC | Age: 50
End: 2020-07-13

## 2020-07-13 DIAGNOSIS — G71.039 LGMD (LIMB-GIRDLE MUSCULAR DYSTROPHY) (H): Primary | ICD-10-CM

## 2020-11-05 ENCOUNTER — COMMUNICATION - HEALTHEAST (OUTPATIENT)
Dept: FAMILY MEDICINE | Facility: CLINIC | Age: 50
End: 2020-11-05

## 2020-11-05 DIAGNOSIS — B00.9 HERPES SIMPLEX TYPE 1 INFECTION: ICD-10-CM

## 2020-11-08 ENCOUNTER — HEALTH MAINTENANCE LETTER (OUTPATIENT)
Age: 50
End: 2020-11-08

## 2021-02-24 ENCOUNTER — COMMUNICATION - HEALTHEAST (OUTPATIENT)
Dept: FAMILY MEDICINE | Facility: CLINIC | Age: 51
End: 2021-02-24

## 2021-02-24 DIAGNOSIS — B00.9 HERPES SIMPLEX TYPE 1 INFECTION: ICD-10-CM

## 2021-02-25 ENCOUNTER — COMMUNICATION - HEALTHEAST (OUTPATIENT)
Dept: FAMILY MEDICINE | Facility: CLINIC | Age: 51
End: 2021-02-25

## 2021-02-25 DIAGNOSIS — B00.9 HERPES SIMPLEX TYPE 1 INFECTION: ICD-10-CM

## 2021-03-04 ENCOUNTER — OFFICE VISIT - HEALTHEAST (OUTPATIENT)
Dept: FAMILY MEDICINE | Facility: CLINIC | Age: 51
End: 2021-03-04

## 2021-03-04 DIAGNOSIS — Z13.228 SCREENING FOR ENDOCRINE, NUTRITIONAL, METABOLIC AND IMMUNITY DISORDER: ICD-10-CM

## 2021-03-04 DIAGNOSIS — G51.0 LEFT-SIDED BELL'S PALSY: ICD-10-CM

## 2021-03-04 DIAGNOSIS — Z13.29 SCREENING FOR ENDOCRINE, NUTRITIONAL, METABOLIC AND IMMUNITY DISORDER: ICD-10-CM

## 2021-03-04 DIAGNOSIS — B00.9 HERPES SIMPLEX TYPE 1 INFECTION: ICD-10-CM

## 2021-03-04 DIAGNOSIS — Z13.21 SCREENING FOR ENDOCRINE, NUTRITIONAL, METABOLIC AND IMMUNITY DISORDER: ICD-10-CM

## 2021-03-04 DIAGNOSIS — E06.3 HYPOTHYROIDISM DUE TO HASHIMOTO'S THYROIDITIS: ICD-10-CM

## 2021-03-04 DIAGNOSIS — Z00.00 VISIT FOR PREVENTIVE HEALTH EXAMINATION: ICD-10-CM

## 2021-03-04 DIAGNOSIS — Z13.0 SCREENING FOR ENDOCRINE, NUTRITIONAL, METABOLIC AND IMMUNITY DISORDER: ICD-10-CM

## 2021-03-04 LAB
ALBUMIN SERPL-MCNC: 4 G/DL (ref 3.5–5)
ALP SERPL-CCNC: 65 U/L (ref 45–120)
ALT SERPL W P-5'-P-CCNC: 76 U/L (ref 0–45)
ANION GAP SERPL CALCULATED.3IONS-SCNC: 8 MMOL/L (ref 5–18)
AST SERPL W P-5'-P-CCNC: 60 U/L (ref 0–40)
BILIRUB SERPL-MCNC: 0.4 MG/DL (ref 0–1)
BUN SERPL-MCNC: 5 MG/DL (ref 8–22)
CALCIUM SERPL-MCNC: 8.9 MG/DL (ref 8.5–10.5)
CHLORIDE BLD-SCNC: 105 MMOL/L (ref 98–107)
CHOLEST SERPL-MCNC: 189 MG/DL
CO2 SERPL-SCNC: 28 MMOL/L (ref 22–31)
CREAT SERPL-MCNC: 0.53 MG/DL (ref 0.6–1.1)
FASTING STATUS PATIENT QL REPORTED: YES
GFR SERPL CREATININE-BSD FRML MDRD: >60 ML/MIN/1.73M2
GLUCOSE BLD-MCNC: 85 MG/DL (ref 70–125)
HBA1C MFR BLD: 4.9 %
HDLC SERPL-MCNC: 71 MG/DL
LDLC SERPL CALC-MCNC: 97 MG/DL
POTASSIUM BLD-SCNC: 4.5 MMOL/L (ref 3.5–5)
PROT SERPL-MCNC: 6.6 G/DL (ref 6–8)
SODIUM SERPL-SCNC: 141 MMOL/L (ref 136–145)
T3 SERPL-MCNC: 48 NG/DL (ref 45–175)
T4 FREE SERPL-MCNC: 1.2 NG/DL (ref 0.7–1.8)
TRIGL SERPL-MCNC: 106 MG/DL
TSH SERPL DL<=0.005 MIU/L-ACNC: 4.53 UIU/ML (ref 0.3–5)

## 2021-03-05 ENCOUNTER — COMMUNICATION - HEALTHEAST (OUTPATIENT)
Dept: FAMILY MEDICINE | Facility: CLINIC | Age: 51
End: 2021-03-05

## 2021-03-16 ENCOUNTER — COMMUNICATION - HEALTHEAST (OUTPATIENT)
Dept: FAMILY MEDICINE | Facility: CLINIC | Age: 51
End: 2021-03-16

## 2021-03-22 ENCOUNTER — IMMUNIZATION (OUTPATIENT)
Dept: NURSING | Facility: CLINIC | Age: 51
End: 2021-03-22
Payer: COMMERCIAL

## 2021-03-22 PROCEDURE — 91300 PR COVID VAC PFIZER DIL RECON 30 MCG/0.3 ML IM: CPT

## 2021-03-22 PROCEDURE — 0001A PR COVID VAC PFIZER DIL RECON 30 MCG/0.3 ML IM: CPT

## 2021-04-12 ENCOUNTER — IMMUNIZATION (OUTPATIENT)
Dept: NURSING | Facility: CLINIC | Age: 51
End: 2021-04-12
Attending: INTERNAL MEDICINE
Payer: COMMERCIAL

## 2021-04-12 PROCEDURE — 0002A PR COVID VAC PFIZER DIL RECON 30 MCG/0.3 ML IM: CPT

## 2021-04-12 PROCEDURE — 91300 PR COVID VAC PFIZER DIL RECON 30 MCG/0.3 ML IM: CPT

## 2021-05-09 ENCOUNTER — COMMUNICATION - HEALTHEAST (OUTPATIENT)
Dept: FAMILY MEDICINE | Facility: CLINIC | Age: 51
End: 2021-05-09

## 2021-05-09 DIAGNOSIS — Z13.29 SCREENING FOR ENDOCRINE, NUTRITIONAL, METABOLIC AND IMMUNITY DISORDER: ICD-10-CM

## 2021-05-09 DIAGNOSIS — Z13.228 SCREENING FOR ENDOCRINE, NUTRITIONAL, METABOLIC AND IMMUNITY DISORDER: ICD-10-CM

## 2021-05-09 DIAGNOSIS — E03.9 HYPOTHYROIDISM, UNSPECIFIED TYPE: ICD-10-CM

## 2021-05-09 DIAGNOSIS — Z13.0 SCREENING FOR ENDOCRINE, NUTRITIONAL, METABOLIC AND IMMUNITY DISORDER: ICD-10-CM

## 2021-05-09 DIAGNOSIS — Z13.21 SCREENING FOR ENDOCRINE, NUTRITIONAL, METABOLIC AND IMMUNITY DISORDER: ICD-10-CM

## 2021-05-30 NOTE — TELEPHONE ENCOUNTER
Medication Question or Clarification  Who is calling: Pharmacy: [facility]  What medication are you calling about? (include dose and sig)   acyclovir (ZOVIRAX) 400 MG tablet 30 tablet 2 7/29/2019 8/5/2019    Sig - Route: Take 1 tablet (400 mg total) by mouth 3 (three) times a day for 7 days. - Oral      Who prescribed the medication?: iTffany Lorenzo MD  What is your question/concern?: Pharmacy states that the quantity of tablets and days dose not mach in the prescription . Please advise.  Pharmacy: [facility] [id number]  Okay to leave a detailed message?: No  Site CMT - Please call the pharmacy to obtain any additional needed information.

## 2021-05-30 NOTE — PROGRESS NOTES
FEMALE ADULT PREVENTIVE EXAM    CHIEF COMPLAINT:  Female preventive exam.    SUBJECTIVE:  Caryn Elizalde is a 49 y.o. female who presents for her routine physical exam.    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  MDRO from ear infection few days ago, ear tubes were placed.  Most recently has been experiencing migraine headaches, described as throbbing headache starting last day of her.,  Lasting about 2 to 3 days, associate with vomiting, photophobia phonophobia totally relieved with Excedrin, ibuprofen on several different over-the-counter medication and treatment.      GYNE HISTORY  Menses: yes  Sexually Active: na  Contraception: no  Last Pap: 2018@ GYN  Abnormal Pap: no  Vaginal Discharge: no      She  has no past medical history on file.    Lab Results   Component Value Date    WBC 5.4 07/29/2019    HGB 14.1 07/29/2019    HCT 40.8 07/29/2019    MCV 95 07/29/2019     07/29/2019     07/29/2019    K 4.3 07/29/2019    BUN 9 07/29/2019     Lab Results   Component Value Date    CHOL 182 07/29/2019    HDL 68 07/29/2019    LDLCALC 103 07/29/2019    TRIG 56 07/29/2019     Lab Results   Component Value Date    TSH 4.65 07/29/2019     BP Readings from Last 3 Encounters:   07/29/19 110/65       Surgeries:  No past surgical history on file.    Family History:  Her family history is not on file.    Social History:  She  reports that she has been smoking cigarettes.  She has been smoking about 0.25 packs per day. She has never used smokeless tobacco. She reports that she drinks alcohol. She reports that she does not use drugs.    Medications:    Current Outpatient Medications:      acyclovir (ZOVIRAX) 400 MG tablet, 400 mg orally 3 times daily for 5 to 10 days, Disp: 30 tablet, Rfl: 2     ibuprofen (ADVIL,MOTRIN) 200 MG tablet, Take  1-2 tablets by mouth., Disp: , Rfl:      levothyroxine (SYNTHROID, LEVOTHROID) 150 MCG tablet, Take 1 tablet (150 mcg total) by mouth daily., Disp: 90 tablet, Rfl: 2     multivitamin, stress formula (STRESS FORMULA) Tab, Take 1 tablet by mouth., Disp: , Rfl:      rizatriptan (MAXALT) 5 MG tablet, Take 1 tablet (5 mg total) by mouth as needed for migraine. May repeat in 2 hours if needed; MAX 15 mg/day, Disp: 10 tablet, Rfl: 3  HELD MEDICATIONS: None.    Allergies:  No latex allergies.  Allergies   Allergen Reactions     Amoxicillin Rash            RISK BEHAVIOR & HEALTH HABITS  Self Breast Exam: yes.  Regular Exercise: yes.  Balanced diet: yes.  Seat Belt Use: yes  Calcium intake/Osteoporosis prevention: yes.    Guns: NA  Sun Screen: YES  Dental Care: YES    REVIEW OF SYSTEMS:  Complete head to toe review of systems is otherwise negative except as above.    OBJECTIVE:  VITAL SIGNS:    Vitals:    07/29/19 1035   BP: 110/65   Pulse: 83   SpO2: 97%     GENERAL:  Patient alert, in no acute distress.  EYES: PERRLA. Extraoccular movements intact, pupils equal, reactive to light and accommodation.  Normal conjunctiva and lids.    ENT:  Hearing grossly normal.  Normal appearance to ears and nose.  Bilateral TM s, external canals, oropharynx normal. Normal lips, gums and teeth.    NECK:  Supple, without thyromegaly or mass, no adenopathies.  RESP:  Clear to auscultation without crackles, wheezes or distress.  Normal respiratory effort.   CV:  Regular rate and rhythm without murmurs, rubs or gallops.  Normal pedal pulses.  No varicosities or edema.  ABDOMEN:  Soft, non-tender, without hepatosplenomegaly, masses, or hernias.   BREASTS:  declined   :  declined   Rectal: declined   LYMPHATIC: Normal palpation of neck.  No lymphadenopathy.  No bruising.  NEURO:  CN II-XII intact, motor & sensory function all intact.  DTR and reflexes normal.  PSYCHIATRIC:  Alert & oriented with normal mood and affect.  Good judgment and  insight.  SKIN:  Normal inspection and palpation.  MUSCULOSKELETAL: Normal gait and station.  - Spine / Ribs / Pelvis: Normal inspection, ROM, stability and strength: Spine, Head, Neck, Upper and Lower Extremities.    ASSESSMENT & PLAN  Caryn was seen today for establish care, annual exam, migraine and menopause.    Diagnoses and all orders for this visit:    Visit for preventive health examination    Screening for endocrine, nutritional, metabolic and immunity disorder  -     levothyroxine (SYNTHROID, LEVOTHROID) 150 MCG tablet; Take 1 tablet (150 mcg total) by mouth daily.  -     Glycosylated Hemoglobin A1c  -     HM1(CBC and Differential)  -     Comprehensive Metabolic Panel  -     Lipid Cascade  -     HM1 (CBC with Diff)    Herpes simplex type 1 infection  -     Discontinue: acyclovir (ZOVIRAX) 400 MG tablet; Take 1 tablet (400 mg total) by mouth 3 (three) times a day for 7 days.  -     acyclovir (ZOVIRAX) 400 MG tablet; 400 mg orally 3 times daily for 5 to 10 days    Limb-girdle muscular dystrophy (H)    Fatty liver  -     US Abdomen Limited; Future    Hypothyroidism, unspecified type  -     levothyroxine (SYNTHROID, LEVOTHROID) 150 MCG tablet; Take 1 tablet (150 mcg total) by mouth daily.  -     Thyroid Cascade  -     Thyroid Peroxidase Antibody  -     T4, Free  -     T3, Total    Screening for HIV without presence of risk factors  -     HIV Antigen/Antibody Screening Cascade  -     Hepatitis C Antibody (Anti-HCV)    Menstrual migraine without status migrainosus, not intractable  -     rizatriptan (MAXALT) 5 MG tablet; Take 1 tablet (5 mg total) by mouth as needed for migraine. May repeat in 2 hours if needed; MAX 15 mg/day    Elevated transaminase measurement  -     US Abdomen Limited; Future    Herpes controlled with acyclovir, menstrual migraine, maxalt  prescribed, possible side effect discussed, follow-up in a few weeks if still not improving.  General  Immunizations reviewed and updated .  Alcohol  use, safety and moderation discussed.  Recommended adequate calcium, vitamin D intake/osteoporosis prevention.  Discussed colon cancer screening at age 50, 45 if -American.  Diet and exercise reviewed, including goal of aerobic exercise 30-90 minutes most days of the week, moderation of portions sizes, avoiding eating out and fast food and increase in fruits and vegetables.  Discussed safe sex practices.    Discussed & recommended seat belt (& motorcycle helmet) use.  Discussed & recommended smoke detector.  Discussed sun protection.  Discussed weight management.  Discussed self breast exam, screening mammogram timing.  I have had an Advance Directives discussion with the patient.  The following high BMI interventions were performed this visit: encouragement to exercise and weight loss from baseline weight    Tiffany Lorenzo MD

## 2021-05-31 NOTE — TELEPHONE ENCOUNTER
Left message to call back for: Patient  Information to relay to patient:  Please see message below from Dr. Lorenzo and relay to pt when she calls back. Thanks!

## 2021-05-31 NOTE — TELEPHONE ENCOUNTER
Patient Returning Call  Reason for call:  Message from clinic  Information relayed to patient:  Message from Tiffany Lorenzo MD sent at 7/31/2019  6:29 PM CDT -----  Liver function test mildly elevated, suspecting from fatty liver disease, will place an order to get an ultrasound to rule out any other conditions.   Patient has additional questions:  Yes  If YES, what are your questions/concerns:  1)  Patient always has abnormal liver function test due to her muscular dystrophy.  2)  She is asking about result of thyroid testing.  Okay to leave a detailed message?: Yes

## 2021-05-31 NOTE — TELEPHONE ENCOUNTER
Dr. Lorenzo,    I had called Caryn to get the US scheduled and she is still waiting for a phone call from because she feels that she does not need this US. After you talk to her let me know what you guys have decided on so I can get her scheduled or remove the orders.    Thanks,  Betty

## 2021-05-31 NOTE — TELEPHONE ENCOUNTER
----- Message from Tiffany Lorenzo MD sent at 7/31/2019  6:29 PM CDT -----  Liver function test mildly elevated, suspecting from fatty liver disease, will place an order to get an ultrasound to rule out any other conditions.

## 2021-05-31 NOTE — TELEPHONE ENCOUNTER
Spoke with pt to see what additional questions she had about thyroid labs.  It looks like her thyroid peroxidase is extremely elevated at 1,617.2.  Please advise on what this means.    She also wanted you to be aware that she has had the workup in the past at [facility] in regards to the elevated liver tests.  She did have an ultrasound in the past as well and her provider said that this is probably due to her muscular dystrophy.  She stated that unless there is something else in her labs that would suggest something other than this, she would like to bring this to your attention that she probably doesn't need to complete this.    In regards to her migraine medication.  She started to get the symptoms of her migraine late last week, on Saturday morning she took 1 of the rizitriptan and slept for a little while, the pain went from 7-8 down to 1-2.  Then later in the day the symptoms came back again, she took another pill and again the same thing happened.  She has taken 2 of the rizitriptan daily since Saturday.  She states that this happens every month with her menstrual cycles.  She is wondering if this is ok to take this often for this many days?  Or is there another medication that she should try?    Pt is aware you are out of clinic today and is fine to wait until you are back tomorrow to have this questions addressed.

## 2021-06-03 VITALS — BODY MASS INDEX: 31.03 KG/M2 | WEIGHT: 181.75 LBS | HEIGHT: 64 IN

## 2021-06-04 VITALS
RESPIRATION RATE: 16 BRPM | DIASTOLIC BLOOD PRESSURE: 80 MMHG | HEIGHT: 64 IN | TEMPERATURE: 98.6 F | SYSTOLIC BLOOD PRESSURE: 134 MMHG | BODY MASS INDEX: 30.63 KG/M2 | WEIGHT: 179.44 LBS | HEART RATE: 84 BPM

## 2021-06-04 NOTE — TELEPHONE ENCOUNTER
Refill Approved    Rx renewed per Medication Renewal Policy. Medication was last renewed on   acyclovir (ZOVIRAX) 400 MG tablet 30 tablet 2 7/30/2019     rizatriptan (MAXALT) 5 MG tablet 10 tablet 3 7/29/2019      Rona Cheema, [facility] Triage/Med Refill 12/25/2019     Requested Prescriptions   Pending Prescriptions Disp Refills     acyclovir (ZOVIRAX) 400 MG tablet [Pharmacy Med Name: ACYCLOVIR 400 MG TABLET] 30 tablet 2     Sig: TAKE 1 TABLET BY MOUTH 3 TIMES DAILY FOR 5-10 DAYS       Antivirals Refill Protocol Passed - 12/23/2019 10:26 AM        Passed - Renal function done in last year     Creatinine   Date Value Ref Range Status   07/29/2019 0.57 (L) 0.60 - 1.10 mg/dL Final             Passed - Visit with PCP or prescribing provider visit in past 12 months or next 3 months     Last office visit with prescriber/PCP: Visit date not found OR same dept: Visit date not found OR same specialty: Visit date not found  Last physical: 7/29/2019 Last MTM visit: Visit date not found   Next visit within 3 mo: Visit date not found  Next physical within 3 mo: Visit date not found  Prescriber OR PCP: Tiffany Lorenzo MD  Last diagnosis associated with med order: 1. Herpes simplex type 1 infection  - acyclovir (ZOVIRAX) 400 MG tablet [Pharmacy Med Name: ACYCLOVIR 400 MG TABLET]; TAKE 1 TABLET BY MOUTH 3 TIMES DAILY FOR 5-10 DAYS  Dispense: 30 tablet; Refill: 2    2. Menstrual migraine without status migrainosus, not intractable  - rizatriptan (MAXALT) 5 MG tablet [Pharmacy Med Name: RIZATRIPTAN 5 MG TABLET]; TAKE 1 TABLET BY MOUTH AS NEEDED FOR MIGRAINE. MAY REPEAT IN 2 HOURS IF NEEDED MAX 15 MG/DAY  Dispense: 10 tablet; Refill: 1    If protocol passes may refill for 12 months if within 3 months of last provider visit (or a total of 15 months).             Passed - Patient does not have active pregnancy episode        Passed - Patient has not had positive pregnancy test in last 280 days     No results found  for: HCGQUAL, PREGTESTUR, PREGSERUM, BHCG          rizatriptan (MAXALT) 5 MG tablet [Pharmacy Med Name: RIZATRIPTAN 5 MG TABLET] 10 tablet 1     Sig: TAKE 1 TABLET BY MOUTH AS NEEDED FOR MIGRAINE. MAY REPEAT IN 2 HOURS IF NEEDED MAX 15 MG/DAY       Triptans Refill Protocol Passed - 12/23/2019 10:26 AM        Passed - PCP or prescribing provider visit in past 12 months       Last office visit with prescriber/PCP: Visit date not found OR same dept: Visit date not found OR same specialty: Visit date not found  Last physical: 7/29/2019 Last MTM visit: Visit date not found   Next visit within 3 mo: Visit date not found  Next physical within 3 mo: Visit date not found  Prescriber OR PCP: Tiffany Lorenzo MD  Last diagnosis associated with med order: 1. Herpes simplex type 1 infection  - acyclovir (ZOVIRAX) 400 MG tablet [Pharmacy Med Name: ACYCLOVIR 400 MG TABLET]; TAKE 1 TABLET BY MOUTH 3 TIMES DAILY FOR 5-10 DAYS  Dispense: 30 tablet; Refill: 2    2. Menstrual migraine without status migrainosus, not intractable  - rizatriptan (MAXALT) 5 MG tablet [Pharmacy Med Name: RIZATRIPTAN 5 MG TABLET]; TAKE 1 TABLET BY MOUTH AS NEEDED FOR MIGRAINE. MAY REPEAT IN 2 HOURS IF NEEDED MAX 15 MG/DAY  Dispense: 10 tablet; Refill: 1    If protocol passes may refill for 12 months if within 3 months of last provider visit (or a total of 15 months).

## 2021-06-05 VITALS
DIASTOLIC BLOOD PRESSURE: 75 MMHG | OXYGEN SATURATION: 99 % | WEIGHT: 180 LBS | SYSTOLIC BLOOD PRESSURE: 116 MMHG | BODY MASS INDEX: 31.39 KG/M2 | RESPIRATION RATE: 16 BRPM | HEART RATE: 79 BPM

## 2021-06-06 NOTE — PROGRESS NOTES
"Assessment / Impression     1. Acute otitis media of left ear with perforation  cefdinir (OMNICEF) 300 MG capsule    Ambulatory referral to ENT    ciprofloxacin-dexamethasone (CIPRODEX) otic suspension    DISCONTINUED: ciprofloxacin-dexamethasone (CIPRODEX) otic suspension   2. History of tympanostomy tube placement  Ambulatory referral to ENT   3. Facial palsy           Plan:     She was given a prescription for Ceftin ear and Ciprodex.  I also put in a referral for her to see ENT again since this is a similar issue that she had in 2018 when she was treated with tympanostomy tube placement.  If symptoms do not improve she may return to the clinic.    Subjective:      HPI: Caryn Elizalde is a 49 y.o. female who has a history of facial palsy presents to the clinic complaining of drainage from her left ear that started this morning.  She denies feeling ill.  She denies having pain, but she has a history of facial palsy.  She has been feeling more tired lately.  She had an ear infection in 2008 which required tubes to be placed on the left side by ENT.      Review of Systems  Pertinent items are noted in HPI.       Objective:     /80 (Patient Site: Left Arm, Patient Position: Sitting, Cuff Size: Adult Regular)   Pulse 84   Temp 98.6  F (37  C) (Oral)   Resp 16   Ht 5' 4.25\" (1.632 m)   Wt 179 lb 7 oz (81.4 kg)   LMP 02/24/2020   Breastfeeding No   BMI 30.56 kg/m      General Appearance: Alert, cooperative, appears slightly fatigued.  Head: Normocephalic, without obvious abnormality, atraumatic.  Eyes: PERRL, conjunctiva/corneas clear, EOM's intact.  Ears: Right tympanic membrane appears clear.  There is purulent discharge present within the left canal.  The visualized portion of the left tympanic membrane is erythematous.  Throat: Clear.  No exudates.  Neck: Supple, symmetrical, trachea midline, no lymphadenopathy.  Lungs: Clear to auscultation bilaterally, respirations unlabored.  Heart:: Regular rate " and rhythm.  Extremities: Extremities normal, atraumatic, no cyanosis or edema.        No results found for this or any previous visit (from the past 168 hour(s)).

## 2021-06-06 NOTE — TELEPHONE ENCOUNTER
2-18-20  Hello just connecting back, I have called pt today 2-18-20 and LM for her to call [facility] for an appt   Thanks  Cristy

## 2021-06-06 NOTE — TELEPHONE ENCOUNTER
Pt was seen by Dr Lorenzo in July.  She has been having headaches.  She did see a OBGYN but that didn't help.  She would like Dr Lorenzo to refer her to the Neurologist that they discused at the last appointment.  Please contact pt if she needs to schedule another appointment.  thanks

## 2021-06-08 NOTE — TELEPHONE ENCOUNTER
Refill Approved    Rx renewed per Medication Renewal Policy. Medication was last renewed on 7/29/19.    Renee Workman, [facility] Triage/Med Refill 5/12/2020     Requested Prescriptions   Pending Prescriptions Disp Refills     levothyroxine (SYNTHROID, LEVOTHROID) 150 MCG tablet [Pharmacy Med Name: LEVOTHYROXINE 150 MCG TABLET] 90 tablet 2     Sig: TAKE 1 TABLET BY MOUTH EVERY DAY       Thyroid Hormones Protocol Passed - 5/10/2020 12:52 PM        Passed - Provider visit in past 12 months or next 3 months     Last office visit with prescriber/PCP: Visit date not found OR same dept: Visit date not found OR same specialty: 3/5/2020 Osman Palmer,   Last physical: 7/29/2019 Last MTM visit: Visit date not found   Next visit within 3 mo: Visit date not found  Next physical within 3 mo: Visit date not found  Prescriber OR PCP: Tiffany Lorenzo MD  Last diagnosis associated with med order: 1. Screening for endocrine, nutritional, metabolic and immunity disorder  - levothyroxine (SYNTHROID, LEVOTHROID) 150 MCG tablet [Pharmacy Med Name: LEVOTHYROXINE 150 MCG TABLET]; TAKE 1 TABLET BY MOUTH EVERY DAY  Dispense: 90 tablet; Refill: 2    2. Hypothyroidism, unspecified type  - levothyroxine (SYNTHROID, LEVOTHROID) 150 MCG tablet [Pharmacy Med Name: LEVOTHYROXINE 150 MCG TABLET]; TAKE 1 TABLET BY MOUTH EVERY DAY  Dispense: 90 tablet; Refill: 2    If protocol passes may refill for 12 months if within 3 months of last provider visit (or a total of 15 months).             Passed - TSH on file in past 12 months for patient age 12 & older     TSH   Date Value Ref Range Status   03/18/2020 3.63 0.30 - 5.00 uIU/mL Final

## 2021-06-12 NOTE — TELEPHONE ENCOUNTER
RN cannot approve Refill Request    RN can NOT refill this medication PCP messaged that patient is overdue for Labs. Last office visit: Visit date not found Last Physical: 7/29/2019 Last MTM visit: Visit date not found Last visit same specialty: 3/5/2020 Osman Palmer DO.  Next visit within 3 mo: Visit date not found  Next physical within 3 mo: Visit date not found      Felicia Narayan, [facility] Triage/Med Refill 11/7/2020    Requested Prescriptions   Pending Prescriptions Disp Refills     acyclovir (ZOVIRAX) 400 MG tablet [Pharmacy Med Name: ACYCLOVIR 400 MG TABLET] 30 tablet 1     Sig: TAKE 1 TABLET BY MOUTH 3 TIMES DAILY FOR 5-10 DAYS       Antivirals Refill Protocol Failed - 11/5/2020 11:45 AM        Failed - Renal function done in last year     Creatinine   Date Value Ref Range Status   07/29/2019 0.57 (L) 0.60 - 1.10 mg/dL Final             Failed - Visit with PCP or prescribing provider visit in past 12 months or next 3 months     Last office visit with prescriber/PCP: Visit date not found OR same dept: Visit date not found OR same specialty: 3/5/2020 Osman Palmer DO  Last physical: 7/29/2019 Last MTM visit: Visit date not found   Next visit within 3 mo: Visit date not found  Next physical within 3 mo: Visit date not found  Prescriber OR PCP: Tiffany Lorenzo MD  Last diagnosis associated with med order: 1. Herpes simplex type 1 infection  - acyclovir (ZOVIRAX) 400 MG tablet [Pharmacy Med Name: ACYCLOVIR 400 MG TABLET]; TAKE 1 TABLET BY MOUTH 3 TIMES DAILY FOR 5-10 DAYS  Dispense: 30 tablet; Refill: 1    If protocol passes may refill for 12 months if within 3 months of last provider visit (or a total of 15 months).             Passed - Patient does not have active pregnancy episode        Passed - Patient has not had positive pregnancy test in last 280 days     No results found for: HCGQUAL, PREGTESTUR, PREGSERUM, BHCG

## 2021-06-15 NOTE — TELEPHONE ENCOUNTER
RN cannot approve Refill Request    RN can NOT refill this medication PCP messaged that patient is overdue for Labs and Office Visit. Last office visit: Visit date not found Last Physical: 7/29/2019 Last MTM visit: Visit date not found Last visit same specialty: 3/5/2020 Osman Palmer DO.  Next visit within 3 mo: Visit date not found  Next physical within 3 mo: Visit date not found      Marie Marcelo, [facility] Triage/Med Refill 2/25/2021    Requested Prescriptions   Pending Prescriptions Disp Refills     acyclovir (ZOVIRAX) 400 MG tablet 30 tablet 1       Antivirals Refill Protocol Failed - 2/25/2021  5:56 PM        Failed - Renal function done in last year     Creatinine   Date Value Ref Range Status   07/29/2019 0.57 (L) 0.60 - 1.10 mg/dL Final             Failed - Visit with PCP or prescribing provider visit in past 12 months or next 3 months     Last office visit with prescriber/PCP: Visit date not found OR same dept: Visit date not found OR same specialty: 3/5/2020 Osman Palmer DO  Last physical: 7/29/2019 Last MTM visit: Visit date not found   Next visit within 3 mo: Visit date not found  Next physical within 3 mo: Visit date not found  Prescriber OR PCP: Tiffany Lorenzo MD  Last diagnosis associated with med order: 1. Herpes simplex type 1 infection  - acyclovir (ZOVIRAX) 400 MG tablet  Dispense: 30 tablet; Refill: 1    If protocol passes may refill for 12 months if within 3 months of last provider visit (or a total of 15 months).             Passed - Patient does not have active pregnancy episode        Passed - Patient has not had positive pregnancy test in last 280 days     No results found for: HCGQUAL, PREGTESTUR, PREGSERUM, BHCG

## 2021-06-15 NOTE — PROGRESS NOTES
"FEMALE ADULT PREVENTIVE EXAM    CHIEF COMPLAINT:  Female preventive exam.    SUBJECTIVE:  Caryn Elizalde is a 50 y.o. female who presents for her routine physical exam.    Patient would like to address the following concerns today: Topamax did cause some significant side effect included sleep difficulty, \"not thinking straight\", medication was then discontinued by neurologist.  Only taking Maxalt as needed, and 80 to 90% of the case medication that seems to help.  Migraine headache related to menstrual period, currently having less menstruation.  History of herpes infection, with Bell's palsy and occasional rash on the back, taking acyclovir as needed, also interested in getting the Shingrix vaccine today.  Muscular dystrophy, followed by neurologist has been stable she does AFOs in both feet for support.      GYNE HISTORY  Menses: yes  Sexually Active: NA  Contraception: No  Last Pap: 2019  Abnormal Pap: No  Vaginal Discharge: no      She  has no past medical history on file.    Lab Results   Component Value Date    WBC 5.5 03/18/2020    HGB 14.4 03/18/2020    HCT 41.2 03/18/2020    MCV 92 03/18/2020     03/18/2020     07/29/2019    K 4.3 07/29/2019    BUN 9 07/29/2019     Lab Results   Component Value Date    CHOL 182 07/29/2019    HDL 68 07/29/2019    LDLCALC 103 07/29/2019    TRIG 56 07/29/2019     Lab Results   Component Value Date    TSH 3.63 03/18/2020     BP Readings from Last 3 Encounters:   03/04/21 116/75   03/05/20 134/80   07/29/19 110/65       Surgeries:  No past surgical history on file.    Family History:  Her family history includes Breast cancer in her paternal aunt.    Social History:  She  reports that she has been smoking cigarettes. She has been smoking about 0.25 packs per day. She has never used smokeless tobacco. She reports current alcohol use. She reports that she does not use drugs.    Medications:    Current Outpatient Medications:      acyclovir (ZOVIRAX) 400 MG tablet, " 400 mg orally 3 times daily for 5 days, Disp: 30 tablet, Rfl: 6     levothyroxine (SYNTHROID, LEVOTHROID) 150 MCG tablet, TAKE 1 TABLET BY MOUTH EVERY DAY, Disp: 90 tablet, Rfl: 3     rizatriptan (MAXALT) 5 MG tablet, TAKE 1 TABLET BY MOUTH AS NEEDED FOR MIGRAINE. MAY REPEAT IN 2 HOURS IF NEEDED MAX 15 MG/DAY, Disp: 10 tablet, Rfl: 0     ciprofloxacin-dexamethasone (CIPRODEX) otic suspension, 4 drops into affected ear(s) twice daily x 10 days., Disp: 7.5 mL, Rfl: 0     ibuprofen (ADVIL,MOTRIN) 200 MG tablet, Take 1-2 tablets by mouth., Disp: , Rfl:      multivitamin, stress formula (STRESS FORMULA) Tab, Take 1 tablet by mouth., Disp: , Rfl:   HELD MEDICATIONS: None.    Allergies:  No latex allergies.  Allergies   Allergen Reactions     Amoxicillin Rash            RISK BEHAVIOR & HEALTH HABITS  Self Breast Exam: yes.  Regular Exercise: yes.  Balanced diet: yes.  Seat Belt Use: yes  Calcium intake/Osteoporosis prevention: yes.    Guns: NA  Sun Screen: YES  Dental Care: YES    REVIEW OF SYSTEMS:  Complete head to toe review of systems is otherwise negative except as above.    OBJECTIVE:  VITAL SIGNS:    Vitals:    03/04/21 0925   BP: 116/75   Pulse: 79   Resp: 16   SpO2: 99%     GENERAL:  Patient alert, in no acute distress.  EYES: PERRLA. Extraoccular movements intact, pupils equal, reactive to light and accommodation.  Normal conjunctiva and lids.    ENT:  Hearing grossly normal.  Normal appearance to ears and nose.  Bilateral TM s, external canals, oropharynx normal. Normal lips, gums and teeth.    NECK:  Supple, without thyromegaly or mass, no adenopathies.  RESP:  Clear to auscultation without crackles, wheezes or distress.  Normal respiratory effort.   CV:  Regular rate and rhythm without murmurs, rubs or gallops.  Normal pedal pulses.  No varicosities or edema.  ABDOMEN:  Soft, non-tender, without hepatosplenomegaly, masses, or hernias.   LYMPHATIC: Normal palpation of neck.  No lymphadenopathy.  No  bruising.  NEURO:  CN II-XII intact, motor & sensory function all intact.  DTR and reflexes normal.  PSYCHIATRIC:  Alert & oriented with normal mood and affect.  Good judgment and insight.  SKIN:  Normal inspection and palpation.  MUSCULOSKELETAL: Normal gait and station.  - Spine / Ribs / Pelvis: Normal inspection, ROM, stability and strength: Spine, Head, Neck, Upper and Lower Extremities.    ASSESSMENT & PLAN  Caryn was seen today for annual exam.    Diagnoses and all orders for this visit:    Visit for preventive health examination    Screening for endocrine, nutritional, metabolic and immunity disorder  -     Glycosylated Hemoglobin A1c  -     Comprehensive Metabolic Panel  -     Lipid Cascade    Hypothyroidism, unspecified type  -     Thyroid Cascade  -     T4, Free  -     T3, Total    Left-sided Bell's palsy    Herpes simplex type 1 infection  -     acyclovir (ZOVIRAX) 400 MG tablet; 400 mg orally 3 times daily for 5 days    Other orders  -     Varicella Zoster, Recombinant Vaccine IM; Standing  -     Varicella Zoster, Recombinant Vaccine IM    Hypothyroidism, will check thyroid panel today adjust medication accordingly.  History of Bell palsy from herpes infection, she takes acyclovir as needed, hopefully getting the Shingrix vaccine will help minimize flareup.  Migraine headaches, seems to be improving with Maxalt, she can takes 4-5 dose on average, hopefully not having her menstruation with minimize her headaches.  Has seen neurologist.  Biometric form completed today,, copy will be scan.  General  Immunizations reviewed and updated .  Alcohol use, safety and moderation discussed.  Recommended adequate calcium, vitamin D intake/osteoporosis prevention.  Discussed colon cancer screening at age 50, 45 if -American.  Diet and exercise reviewed, including goal of aerobic exercise 30-90 minutes most days of the week, moderation of portions sizes, avoiding eating out and fast food and increase in  fruits and vegetables.  Discussed safe sex practices.    Discussed & recommended seat belt (& motorcycle helmet) use.  Discussed & recommended smoke detector.  Discussed sun protection.  Discussed weight management.  Discussed self breast exam, screening mammogram timing.  The following high BMI interventions were performed this visit: encouragement to exercise and weight loss from baseline weight    Tiffany Lorenzo MD

## 2021-06-15 NOTE — TELEPHONE ENCOUNTER
RN cannot approve Refill Request    RN can NOT refill this medication PCP messaged that patient is overdue for Labs and Office Visit. Last office visit: Visit date not found Last Physical: 7/29/2019 Last MTM visit: Visit date not found Last visit same specialty: 3/5/2020 Osman Palmer DO.  Next visit within 3 mo: Visit date not found  Next physical within 3 mo: Visit date not found      Marie Marcelo, [facility] Triage/Med Refill 2/24/2021    Requested Prescriptions   Pending Prescriptions Disp Refills     acyclovir (ZOVIRAX) 400 MG tablet 30 tablet 1       Antivirals Refill Protocol Failed - 2/24/2021 10:58 AM        Failed - Renal function done in last year     Creatinine   Date Value Ref Range Status   07/29/2019 0.57 (L) 0.60 - 1.10 mg/dL Final             Failed - Visit with PCP or prescribing provider visit in past 12 months or next 3 months     Last office visit with prescriber/PCP: Visit date not found OR same dept: Visit date not found OR same specialty: 3/5/2020 Osmna Palmer DO  Last physical: 7/29/2019 Last MTM visit: Visit date not found   Next visit within 3 mo: Visit date not found  Next physical within 3 mo: Visit date not found  Prescriber OR PCP: Tiffany Lorenzo MD  Last diagnosis associated with med order: 1. Herpes simplex type 1 infection  - acyclovir (ZOVIRAX) 400 MG tablet  Dispense: 30 tablet; Refill: 1    If protocol passes may refill for 12 months if within 3 months of last provider visit (or a total of 15 months).             Passed - Patient does not have active pregnancy episode        Passed - Patient has not had positive pregnancy test in last 280 days     No results found for: HCGQUAL, PREGTESTUR, PREGSERUM, BHCG

## 2021-06-16 NOTE — TELEPHONE ENCOUNTER
Please look at what happened to this patient biometric form, was supposed to be faxed, keep a copy in file,and mail the  original to pt.  Thanks

## 2021-06-17 NOTE — TELEPHONE ENCOUNTER
Refill Approved    Rx renewed per Medication Renewal Policy. Medication was last renewed on 5/12/20.    Seferino Post, [facility] Triage/Med Refill 5/10/2021     Requested Prescriptions   Pending Prescriptions Disp Refills     levothyroxine (SYNTHROID, LEVOTHROID) 150 MCG tablet [Pharmacy Med Name: LEVOTHYROXINE 150 MCG TABLET] 90 tablet 3     Sig: TAKE 1 TABLET BY MOUTH EVERY DAY       Thyroid Hormones Protocol Passed - 5/9/2021  9:43 AM        Passed - Provider visit in past 12 months or next 3 months     Last office visit with prescriber/PCP: Visit date not found OR same dept: Visit date not found OR same specialty: 3/5/2020 Osman Palmer,   Last physical: 3/4/2021 Last MTM visit: Visit date not found   Next visit within 3 mo: Visit date not found  Next physical within 3 mo: Visit date not found  Prescriber OR PCP: Tiffany Lorenzo MD  Last diagnosis associated with med order: 1. Screening for endocrine, nutritional, metabolic and immunity disorder  - levothyroxine (SYNTHROID, LEVOTHROID) 150 MCG tablet [Pharmacy Med Name: LEVOTHYROXINE 150 MCG TABLET]; TAKE 1 TABLET BY MOUTH EVERY DAY  Dispense: 90 tablet; Refill: 3    2. Hypothyroidism, unspecified type  - levothyroxine (SYNTHROID, LEVOTHROID) 150 MCG tablet [Pharmacy Med Name: LEVOTHYROXINE 150 MCG TABLET]; TAKE 1 TABLET BY MOUTH EVERY DAY  Dispense: 90 tablet; Refill: 3    If protocol passes may refill for 12 months if within 3 months of last provider visit (or a total of 15 months).             Passed - TSH on file in past 12 months for patient age 12 & older     TSH   Date Value Ref Range Status   03/04/2021 4.53 0.30 - 5.00 uIU/mL Final

## 2021-06-18 NOTE — PATIENT INSTRUCTIONS - HE
Patient Instructions by Tiffany Lorenzo MD at 3/4/2021  9:20 AM     Author: Tiffany Lorenzo MD Service: -- Author Type: Physician    Filed: 3/4/2021  1:41 PM Encounter Date: 3/4/2021 Status: Signed    : Tiffany Lorenzo MD (Physician)         Patient Education     Prevention Guidelines, Women Ages 50 to 64  Screening tests and vaccines are an important part of managing your health. A screening test is done to find possible disorders or diseases in people who don't have any symptoms. The goal is to find a disease early so lifestyle changes can be made and you can be watched more closely to reduce the risk of disease, or to detect it early enough to treat it most effectively. Screening tests are not considered diagnostic, but are used to determine if more testing is needed. Health counseling is essential, too. Below are guidelines for these, for women ages 50 to 64. Talk with your healthcare provider to make sure youre up to date on what you need.  Screening Who needs it How often   Type 2 diabetes or prediabetes All women beginning at age 45 and women without symptoms at any age who are overweight or obese and have 1 or more additional risk factors for diabetes. At  least every 3 years   Type 2 diabetes or prediabetes All women diagnosed with gestational diabetes Lifelong testing every 3 years   Type 2 diabetes All women with prediabetes Every year   Alcohol misuse All women in this age group At routine exams   Blood pressure All women in this age group Yearly checkup if your blood pressure is normal  Normal blood pressure is less than 120/80 mm Hg  If your blood pressure reading is higher than normal, follow the advice of your healthcare provider   Breast cancer All women at average risk in this age group Yearly mammogram should be done until age 54. At age 55, you can switch to every other year or choose to continue yearly.  All women should know the possible benefits and risks of  breast cancer screening with mammograms.   Cervical cancer All women in this age group, except women who have had a complete hysterectomy Pap test every 3 years or Pap test with human papillomavirus (HPV) test every 5 years   Chlamydia Women at increased risk for infection At routine exams   Colorectal cancer All women at average risk in this age group Multiple tests are available and are used at different times. Possible tests include:    Flexible sigmoidoscopy every 5 years, or    Colonoscopy every 10 years, or    CT colonography (virtual colonoscopy) every 5 years, or    Yearly fecal occult blood test, or    Yearly fecal immunochemical test every year, or    Stool DNA test, every 3 years  If you choose a test other than a colonoscopy and have an abnormal test result, you will need to follow up with a colonoscopy. Screening advice varies among expert groups. Talk with your healthcare provider about which tests are best for you.  Some people should be screened using a different schedule because of their personal or family health history. Talk with your healthcare provider about your health history.   Depression All women in this age group At routine exams   Gonorrhea Sexually active women at increased risk for infection At routine exams   Hepatitis C Anyone at increased risk; 1 time for those born between 1945 and 1965 At routine exams   High cholesterol or triglycerides All women in this age group who are at risk for coronary artery disease At least every 5 years   HIV All women At routine exams   Lung cancer Adults age 55 to 80 who have smoked Yearly screening in smokers with 30 pack-year history of smoking or who quit within 15 years   Obesity All women in this age group At routine exams   Osteoporosis Women who are postmenopausal Ask your healthcare provider   Syphilis Women at increased risk for infection - talk with your healthcare provider At routine exams   Tuberculosis Women at increased risk for infection  - talk with your healthcare provider Ask your healthcare provider   Vision All women in this age group Ask your healthcare provider   Vaccine Who needs it How often   Chickenpox (varicella) All women in this age group who have no record of this infection or vaccine 2 doses; the second dose should be given at least 4 weeks after the first dose   Hepatitis A Women at increased risk for infection - talk with your healthcare provider 2 doses given at least 6 months apart   Hepatitis B Women at increased risk for infection - talk with your healthcare provider 3 doses over 6 months; second dose should be given 1 month after the first dose; the third dose should be given at least 2 months after the second dose and at least 4 months after the first dose   Haemophilus influenzae Type B (HIB) Women at increased risk for infection - talk with your healthcare provider 1 to 3 doses   Influenza (flu) All women in this age group Once a year   Measles, mumps, rubella (MMR) Women in this age group through their late 50s who have no record of these infections or vaccines 1 dose   Meningococcal Women at increased risk for infection - talk with your healthcare provider 1 or more doses   Pneumococcal conjugate vaccine (PCV13) and pneumococcal polysaccharide vaccine (PPSV23) Women at increased risk for infection - talk with your healthcare provider PCV13: 1 dose ages 19 to 65 (protects against 13 types of pneumococcal bacteria)  PPSV23: 1 to 2 doses through age 64, or 1 dose at 65 or older (protects against 23 types of pneumococcal bacteria)   Tetanus/diphtheria/pertussis (Td/Tdap) booster All women in this age group Td every 10 years, or a 1-time dose of Tdap instead of a Td booster after age 18, then Td every 10 years   Zoster All women ages 60 and older 1 dose   Counseling Who needs it How often   BRCA gene mutation testing for breast and ovarian cancer susceptibility Women with increased risk for having gene mutation When your risk  is known   Breast cancer and chemoprevention Women at high risk for breast cancer When your risk is known   Diet and exercise Women who are overweight or obese When diagnosed, and then at routine exams   Sexually transmitted infection prevention Women at increased risk for infection - talk with your healthcare provider At routine exams   Use of daily aspirin Women ages 55 and up in this age group who are at risk for cardiovascular health problems such as stroke When your risk is known   Use of tobacco and the health effects it can cause All women in this age group Every exam   1 American [employer] Society  Date Last Reviewed: 1/26/2016 2000-2019 [employer] [address] [place] [address]. All rights reserved. This information is not intended as a substitute for professional medical care. Always follow your healthcare professional's instructions.

## 2021-06-19 NOTE — LETTER
Letter by Tiffany Lorenzo MD at      Author: Tiffany Lorenzo MD Service: -- Author Type: --    Filed:  Encounter Date: 7/31/2019 Status: (Other)         Caryn Elizalde  [address] [place] [id number]             July 31, 2019         Dear Ms. Elizalde,    Below are the results from your recent visit:    Resulted Orders   Glycosylated Hemoglobin A1c   Result Value Ref Range    Hemoglobin A1c 5.0 3.5 - 6.0 %   Comprehensive Metabolic Panel   Result Value Ref Range    Sodium 138 136 - 145 mmol/L    Potassium 4.3 3.5 - 5.0 mmol/L    Chloride 105 98 - 107 mmol/L    CO2 24 22 - 31 mmol/L    Anion Gap, Calculation 9 5 - 18 mmol/L    Glucose 84 70 - 125 mg/dL    BUN 9 8 - 22 mg/dL    Creatinine 0.57 (L) 0.60 - 1.10 mg/dL    GFR MDRD Af Amer >60 >60 mL/min/1.73m2    GFR MDRD Non Af Amer >60 >60 mL/min/1.73m2    Bilirubin, Total 0.3 0.0 - 1.0 mg/dL    Calcium 9.5 8.5 - 10.5 mg/dL    Protein, Total 6.7 6.0 - 8.0 g/dL    Albumin 4.1 3.5 - 5.0 g/dL    Alkaline Phosphatase 63 45 - 120 U/L    AST 62 (H) 0 - 40 U/L    ALT 74 (H) 0 - 45 U/L    Narrative    Fasting Glucose reference range is 70-99 mg/dL per  American Diabetes Association (ADA) guidelines.   Lipid Cascade   Result Value Ref Range    Cholesterol 182 <=199 mg/dL    Triglycerides 56 <=149 mg/dL    HDL Cholesterol 68 >=50 mg/dL    LDL Calculated 103 <=129 mg/dL    Patient Fasting > 8hrs? Unknown    Thyroid Cascade   Result Value Ref Range    TSH 4.65 0.30 - 5.00 uIU/mL   HIV Antigen/Antibody Screening Cascade   Result Value Ref Range    HIV Antigen / Antibody Negative Negative    Narrative    Method is Abbott HIV Ag/Ab for the detection of HIV p24 antigen, HIV-1 antibodies and HIV-2 antibodies.   Hepatitis C Antibody (Anti-HCV)   Result Value Ref Range    Hepatitis C Ab Negative Negative   Thyroid Peroxidase Antibody   Result Value Ref Range    Thyroid Peroxidase Ab 1,617.2 (H) 0.0 - 5.6 IU/mL   T4, Free   Result Value Ref Range    Free T4  1.2 0.7 - 1.8 ng/dL   T3, Total   Result Value Ref Range    T3, Total 62 45 - 175 ng/dL   HM1 (CBC with Diff)   Result Value Ref Range    WBC 5.4 4.0 - 11.0 thou/uL    RBC 4.28 3.80 - 5.40 mill/uL    Hemoglobin 14.1 12.0 - 16.0 g/dL    Hematocrit 40.8 35.0 - 47.0 %    MCV 95 80 - 100 fL    MCH 33.0 27.0 - 34.0 pg    MCHC 34.6 32.0 - 36.0 g/dL    RDW 11.4 11.0 - 14.5 %    Platelets 369 140 - 440 thou/uL    MPV 6.8 (L) 7.0 - 10.0 fL    Neutrophils % 51 50 - 70 %    Lymphocytes % 42 (H) 20 - 40 %    Monocytes % 4 2 - 10 %    Eosinophils % 2 0 - 6 %    Basophils % 1 0 - 2 %    Neutrophils Absolute 2.7 2.0 - 7.7 thou/uL    Lymphocytes Absolute 2.3 0.8 - 4.4 thou/uL    Monocytes Absolute 0.2 0.0 - 0.9 thou/uL    Eosinophils Absolute 0.1 0.0 - 0.4 thou/uL    Basophils Absolute 0.0 0.0 - 0.2 thou/uL       Liver function test mildly elevated, suspecting from fatty liver disease, will place an order to get an ultrasound to rule out any other conditions.    Please call with questions or contact us using [employer].    Sincerely,        Electronically signed by Tiffany Lorenzo MD

## 2021-06-21 NOTE — LETTER
Letter by Tiffany Lorenzo MD at      Author: Tiffany Lorenzo MD Service: -- Author Type: --    Filed:  Encounter Date: 3/5/2021 Status: (Other)         Caryn Elizalde  [address]  [place] [address]             March 5, 2021         Dear Ms. Elizalde,    Below are the results from your recent visit:    Resulted Orders   Glycosylated Hemoglobin A1c   Result Value Ref Range    Hemoglobin A1c 4.9 <=5.6 %   Comprehensive Metabolic Panel   Result Value Ref Range    Sodium 141 136 - 145 mmol/L    Potassium 4.5 3.5 - 5.0 mmol/L    Chloride 105 98 - 107 mmol/L    CO2 28 22 - 31 mmol/L    Anion Gap, Calculation 8 5 - 18 mmol/L    Glucose 85 70 - 125 mg/dL    BUN 5 (L) 8 - 22 mg/dL    Creatinine 0.53 (L) 0.60 - 1.10 mg/dL    GFR MDRD Af Amer >60 >60 mL/min/1.73m2    GFR MDRD Non Af Amer >60 >60 mL/min/1.73m2    Bilirubin, Total 0.4 0.0 - 1.0 mg/dL    Calcium 8.9 8.5 - 10.5 mg/dL    Protein, Total 6.6 6.0 - 8.0 g/dL    Albumin 4.0 3.5 - 5.0 g/dL    Alkaline Phosphatase 65 45 - 120 U/L    AST 60 (H) 0 - 40 U/L    ALT 76 (H) 0 - 45 U/L    Narrative    Fasting Glucose reference range is 70-99 mg/dL per  American Diabetes Association (ADA) guidelines.   Lipid Cascade   Result Value Ref Range    Cholesterol 189 <=199 mg/dL    Triglycerides 106 <=149 mg/dL    HDL Cholesterol 71 >=50 mg/dL    LDL Calculated 97 <=129 mg/dL    Patient Fasting > 8hrs? Yes    Thyroid Cascade   Result Value Ref Range    TSH 4.53 0.30 - 5.00 uIU/mL   T4, Free   Result Value Ref Range    Free T4 1.2 0.7 - 1.8 ng/dL   T3, Total   Result Value Ref Range    T3, Total 48 45 - 175 ng/dL       Liver transaminase were mildly elevated, will recheck in a month or so.  Thyroid test were within acceptable range.    Please call with questions or contact us using [employer].    Sincerely,        Electronically signed by Tiffany Lorenzo MD

## 2021-07-03 NOTE — ADDENDUM NOTE
Addendum Note by Tiffany Lorenzo MD at 8/8/2019  5:36 PM     Author: Tiffany Lorenzo MD Service: -- Author Type: Physician    Filed: 8/8/2019  5:36 PM Encounter Date: 8/5/2019 Status: Signed    : Tiffany Lorenzo MD (Physician)    Addended by: TIFFANY LORENZO on: 8/8/2019 05:36 PM        Modules accepted: Orders

## 2021-08-16 DIAGNOSIS — G43.719 INTRACTABLE CHRONIC MIGRAINE WITHOUT AURA AND WITHOUT STATUS MIGRAINOSUS: ICD-10-CM

## 2021-08-16 RX ORDER — RIZATRIPTAN BENZOATE 10 MG/1
TABLET ORAL
Qty: 12 TABLET | Refills: 0 | Status: SHIPPED | OUTPATIENT
Start: 2021-08-16 | End: 2021-08-26

## 2021-08-16 NOTE — TELEPHONE ENCOUNTER
Refill request for rizatriptan 10 mg   Dr. Maldonado pt, last seen 07/2020  Letter mailed to pt to [facility] care with Neurologist   30 day supply with 0 refills   Medication T'd for review and signature    Jasmeet Berrios MA on 8/16/2021 at 11:17 AM

## 2021-08-16 NOTE — LETTER
8/16/2021        RE: Caryn Elizalde  [address] [address]            Dear Caryn,     We recently provided you with medication refills.  Many medications require routine follow-up with your doctor.    Your prescription(s) have been refilled for 30 days so you may have time for the above noted follow-up. Please call to schedule soon so we can assure you have an appointment before your next refills are needed. If you have already made a follow up appointment, please disregard this letter.     Sincerely,    [facility] [place]     (Formerly known as [facility])

## 2021-08-26 DIAGNOSIS — G43.719 INTRACTABLE CHRONIC MIGRAINE WITHOUT AURA AND WITHOUT STATUS MIGRAINOSUS: ICD-10-CM

## 2021-08-26 RX ORDER — RIZATRIPTAN BENZOATE 10 MG/1
TABLET ORAL
Qty: 6 TABLET | Refills: 0 | Status: SHIPPED | OUTPATIENT
Start: 2021-08-26 | End: 2021-09-02

## 2021-08-26 NOTE — TELEPHONE ENCOUNTER
Refill request for rizatriptan.  Previous pt of Dr. Maldonado. Last seen 7/1/20.  Letter already mailed to pt on 8/16/21 reminding pt to schedule. No future appt made.  14 day supply of Medication T'd for review and signature

## 2021-08-31 ENCOUNTER — MYC MEDICAL ADVICE (OUTPATIENT)
Dept: FAMILY MEDICINE | Facility: CLINIC | Age: 51
End: 2021-08-31

## 2021-08-31 ENCOUNTER — MYC MEDICAL ADVICE (OUTPATIENT)
Dept: NEUROLOGY | Facility: CLINIC | Age: 51
End: 2021-08-31

## 2021-08-31 DIAGNOSIS — G43.719 INTRACTABLE CHRONIC MIGRAINE WITHOUT AURA AND WITHOUT STATUS MIGRAINOSUS: ICD-10-CM

## 2021-09-02 RX ORDER — RIZATRIPTAN BENZOATE 10 MG/1
TABLET ORAL
Qty: 12 TABLET | Refills: 1 | Status: SHIPPED | OUTPATIENT
Start: 2021-09-02 | End: 2021-11-29

## 2021-09-02 NOTE — TELEPHONE ENCOUNTER
Refill request for rizatripatan.  Previous pt of Dr. Maldonado.  Has upcoming appt on 11/29/21 with Dr. Hernández.  Medication T'd for review and signature        Martina Mcguire LPN on 9/2/2021 at 9:35 AM

## 2021-09-11 ENCOUNTER — HEALTH MAINTENANCE LETTER (OUTPATIENT)
Age: 51
End: 2021-09-11

## 2021-09-20 ENCOUNTER — ALLIED HEALTH/NURSE VISIT (OUTPATIENT)
Dept: FAMILY MEDICINE | Facility: CLINIC | Age: 51
End: 2021-09-20
Payer: COMMERCIAL

## 2021-09-20 DIAGNOSIS — Z23 NEED FOR VACCINATION: Primary | ICD-10-CM

## 2021-09-20 PROCEDURE — 90471 IMMUNIZATION ADMIN: CPT | Performed by: FAMILY MEDICINE

## 2021-09-20 PROCEDURE — 90750 HZV VACC RECOMBINANT IM: CPT | Performed by: FAMILY MEDICINE

## 2021-09-23 PROBLEM — G51.0 LEFT-SIDED BELL'S PALSY: Status: ACTIVE | Noted: 2021-03-04

## 2021-09-23 PROBLEM — E06.3 HYPOTHYROIDISM DUE TO HASHIMOTO'S THYROIDITIS: Status: ACTIVE | Noted: 2021-03-04

## 2021-11-24 ENCOUNTER — TELEPHONE (OUTPATIENT)
Dept: NEUROLOGY | Facility: CLINIC | Age: 51
End: 2021-11-24
Payer: COMMERCIAL

## 2021-11-24 NOTE — TELEPHONE ENCOUNTER
Requested Rizatriptan 10 mg tablets as needed for migraine may repeat after 2 hrs if needed. Max 2 doses in 24 hrs  Pt has appt with Dr Hernández 11/29/21

## 2021-11-29 ENCOUNTER — OFFICE VISIT (OUTPATIENT)
Dept: NEUROLOGY | Facility: CLINIC | Age: 51
End: 2021-11-29
Payer: COMMERCIAL

## 2021-11-29 VITALS
WEIGHT: 177 LBS | BODY MASS INDEX: 30.22 KG/M2 | SYSTOLIC BLOOD PRESSURE: 121 MMHG | HEIGHT: 64 IN | DIASTOLIC BLOOD PRESSURE: 75 MMHG | HEART RATE: 76 BPM

## 2021-11-29 DIAGNOSIS — Z79.899 ENCOUNTER FOR LONG-TERM (CURRENT) USE OF MEDICATIONS: ICD-10-CM

## 2021-11-29 DIAGNOSIS — G43.719 INTRACTABLE CHRONIC MIGRAINE WITHOUT AURA AND WITHOUT STATUS MIGRAINOSUS: Primary | ICD-10-CM

## 2021-11-29 PROCEDURE — 99215 OFFICE O/P EST HI 40 MIN: CPT | Performed by: PSYCHIATRY & NEUROLOGY

## 2021-11-29 RX ORDER — RIZATRIPTAN BENZOATE 10 MG/1
TABLET ORAL
Qty: 18 TABLET | Refills: 11 | Status: SHIPPED | OUTPATIENT
Start: 2021-11-29 | End: 2022-12-08

## 2021-11-29 RX ORDER — ONDANSETRON 4 MG/1
4 TABLET, FILM COATED ORAL EVERY 8 HOURS PRN
Qty: 20 TABLET | Refills: 3 | Status: SHIPPED | OUTPATIENT
Start: 2021-11-29 | End: 2023-04-07

## 2021-11-29 ASSESSMENT — MIFFLIN-ST. JEOR: SCORE: 1389.93

## 2021-11-29 NOTE — PROGRESS NOTES
INITIAL NEUROLOGY CONSULTATION - Transfer of Care    DATE OF VISIT: 11/29/2021  MRN: [id number]  PATIENT NAME: Caryn Elizalde  YOB: 1970    REFERRING PROVIDER: No ref. provider found    Chief Complaint   Patient presents with     Migraine     Pt states migraines have improved.        SUBJECTIVE:                                                      HPI:   Caryn Elizalde is a 51 year old female here to establish care for migraine headaches. She was previously followed by Dr. Maldonado in this clinic. When he first met the patient in spring 2020, she was reporting significant migraines. Brain MRI had been unremarkable. He increased her rizatriptan from 5 to 10 mg at that time and also started her on topiramate. Unfortunately she developed constipation and diarrhea, difficulty sleeping and paresthesias on the topiramate. They decided to taper her down to a lower 25 mg daily dose, and she reported significant improvement of the side effect symptoms. She had noted that the topiramate had been helpful in terms of preventing the headaches. Documentation indicates that she also tends to have migraines around the time of menstruation. When she spoke with Dr. Maldonado in July of last year, their plan was to continue rizatriptan and occasional ibuprofen or Excedrin. She was not interested in starting a new preventative medication at that time though they did discuss the options of Depakote or one of the new CGRP antibody medications.  She does not appear to be on the Topamax any longer.    Allison tells me that the pattern of her headaches has changed. They used to start during menstruation, and last a few days afterwards. In the last year they tend to occur a few days before her period. A lot of times the headaches wake her from sleep, starting in the back of her head and migrating forward. She gets severe nausea with her headaches and if untreated will have vomiting.  She endorses sensitivity to light,  visual changes described as squiggles.  She thinks Dr. Maldonado might have prescribed her something for nausea in the past but she never took it.  She has not tried any other triptans or other medications for prevention, besides the Topamax.  She says that she had trouble with side effects even on the low dose of the latter.    The rizatriptan also helps the most if she is able to lay down and rest. She does often have to take a second dose, though not always.  Typically she will take the second dose later in the day if headache symptoms persist.    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 A different kind of headache that responds to ibuprofen.  These headaches feel different than the migraines and are less severe.  She has history of left-sided Bell's palsy as well.  She sees Dr. Diaz in the muscular dystrophy clinic at the [facility], typically annually.    She has clusters of 4-5 days of her migraines per month.    She did have a couple of migraines over the past year that did not respond to the rizatriptan but most of the time they do.  Says she has looked into having a hysterectomy given the hormonal trigger for her headaches, but she does not have any medical need for this other than the headaches.    She endorses regular eye exams.    Past Medical History:   Diagnosis Date     Herpes      Hypothyroidism      Migraines      Past Surgical History:   Procedure Laterality Date     BIOPSY       GYN SURGERY         acyclovir (ZOVIRAX) 400 MG tablet, Take 400 mg by mouth daily as needed  ibuprofen (ADVIL/MOTRIN) 200 MG tablet, Take 1-2 tablets by mouth every 4 hours as needed  levothyroxine (SYNTHROID/LEVOTHROID) 150 MCG tablet, Take 175 mcg by mouth daily     No current facility-administered medications on file prior to visit.    Allergies   Allergen Reactions     Amoxicillin Rash     Other reaction(s): hives        Problem  "(# of Occurrences) Relation (Name,Age of Onset)    Alcoholism (1) Father    Breast Cancer (1) Paternal Aunt    No Known Problems (1) Mother        Social History     Tobacco Use     Smoking status: Current Every Day Smoker     Packs/day: 0.25     Years: 37.00     Pack years: 9.25     Types: Cigarettes, Cigarettes, Cigarettes     Start date: 11/17/2015     Smokeless tobacco: Never Used     Tobacco comment: 4-5 cigarettes a day   Substance Use Topics     Alcohol use: Yes     Alcohol/week: 1.0 standard drink     Types: 1 Standard drinks or equivalent per week     Comment: MONTHLY     Drug use: Never       REVIEW OF SYSTEMS:                                                      10-point review of systems is negative except as mentioned above in HPI.     EXAM:                                                      Physical Exam:   Vitals: /75 (BP Location: Right arm, Patient Position: Sitting)   Pulse 76   Ht 1.613 m (5' 3.5\")   Wt 80.3 kg (177 lb)   BMI 30.86 kg/m    BMI= Body mass index is 30.86 kg/m .  GENERAL: NAD.  HEENT: NC/AT. No TTP of scalp or neck.  CV: RRR. S1, S2.   NECK: No bruits.  PULM: Non-labored breathing.   Neurologic:  MENTAL STATUS: Alert, attentive. Speech is fluent. Normal comprehension.Normal concentration. Adequate fund of knowledge.   CRANIAL NERVES: Discs technically difficult to visualize.  Visual fields intact to confrontation. Pupils equally, round and reactive to light.  Left facial weakness, slight ptosis on that side. EOM full. Hearing intact to conversation. Trapezius strength intact. Palate moves symmetrically. Tongue midline.  MOTOR: 4+/5 in proximal and distal muscle groups of upper extremities.  She has difficulty fully straightening out the legs bilaterally.  AFOs in place.  Tone and bulk normal.   DTRs: Intact and symmetric in biceps, BR. 1+ at patellae.  SENSATION: Normal light touch throughout.  COORDINATION: Normal finger nose finger bilaterally.  STATION AND GAIT: Casual " gait is slightly waddling.  Right hand-dominant.    Relevant Data:  Previous MRI images not available for my review.    ASSESSMENT and PLAN:                                                      Assessment:     ICD-10-CM    1. Intractable chronic migraine without aura and without status migrainosus  G43.719 rizatriptan (MAXALT) 10 MG tablet     ondansetron (ZOFRAN) 4 MG tablet   2. Encounter for long-term (current) use of medications  Z79.899         Ms. Elizalde is a pleasant 51-year-old woman with limb-girdle muscular dystrophy, here for follow-up regarding migraine headaches.  We discussed the pattern of her headaches today and the treatment plan that it was laid out by her prior neurologist.  She feels that the headaches are under reasonable control with just abortive treatment at this time.  She has some questions about the rizatriptan which I answered to the best of my ability.  I do think she would benefit from the addition of an antiemetic since nausea and vomiting are such a big part of her headaches.  We also discussed that hopefully, once she goes through menopause, given the catamenial nature of her headaches hopefully they will dissipate.  I would like to see Allison back in clinic in about 1 year.  She understands and agrees with the plan.    Plan:  -- Continue the rizatriptan as needed for migraine headaches/symptoms.  --Try Zofran (ondansetron) as needed for nausea related to your migraines.  You can take this at the same time as the rizatriptan.  -- Return to Neurology clinic in 1 year.  Please let us know if any concerns arise in the meantime.    Total Time: 40 minutes were spent with the patient and in chart review/documentation (as described above in Assessment and Plan) /coordinating the care on date of service.    Rizwana Hernández MD  Neurology    CC: Maura Allen MD    Dragon software used in the dictation of this note.

## 2021-11-29 NOTE — PATIENT INSTRUCTIONS
Plan:  -- Continue the rizatriptan as needed for migraine headaches/symptoms.  --Try Zofran (ondansetron) as needed for nausea related to your migraines.  You can take this at the same time as the rizatriptan.  -- Return to Neurology clinic in 1 year.  Please let us know if any concerns arise in the meantime.

## 2021-11-29 NOTE — LETTER
11/29/2021         RE: Caryn Elizalde  [address]  [place] [address]        Dear Colleague,    Thank you for referring your patient, Caryn Elizalde, to the [facility] [place]. Please see a copy of my visit note below.    INITIAL NEUROLOGY CONSULTATION - Transfer of Care    DATE OF VISIT: 11/29/2021  MRN: [id number]  PATIENT NAME: Caryn Elizalde  YOB: 1970    REFERRING PROVIDER: No ref. provider found    Chief Complaint   Patient presents with     Migraine     Pt states migraines have improved.        SUBJECTIVE:                                                      HPI:   Caryn Elizalde is a 51 year old female here to establish care for migraine headaches. She was previously followed by Dr. Maldonado in this clinic. When he first met the patient in spring 2020, she was reporting significant migraines. Brain MRI had been unremarkable. He increased her rizatriptan from 5 to 10 mg at that time and also started her on topiramate. Unfortunately she developed constipation and diarrhea, difficulty sleeping and paresthesias on the topiramate. They decided to taper her down to a lower 25 mg daily dose, and she reported significant improvement of the side effect symptoms. She had noted that the topiramate had been helpful in terms of preventing the headaches. Documentation indicates that she also tends to have migraines around the time of menstruation. When she spoke with Dr. Maldonado in July of last year, their plan was to continue rizatriptan and occasional ibuprofen or Excedrin. She was not interested in starting a new preventative medication at that time though they did discuss the options of Depakote or one of the new CGRP antibody medications.  She does not appear to be on the Topamax any longer.    Allison tells me that the pattern of her headaches has changed. They used to start during menstruation, and last a few days afterwards. In the last  year they tend to occur a few days before her period. A lot of times the headaches wake her from sleep, starting in the back of her head and migrating forward. She gets severe nausea with her headaches and if untreated will have vomiting.  She endorses sensitivity to light, visual changes described as squiggles.  She thinks Dr. Maldonado might have prescribed her something for nausea in the past but she never took it.  She has not tried any other triptans or other medications for prevention, besides the Topamax.  She says that she had trouble with side effects even on the low dose of the latter.    The rizatriptan also helps the most if she is able to lay down and rest. She does often have to take a second dose, though not always.  Typically she will take the second dose later in the day if headache symptoms persist.    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 A different kind of headache that responds to ibuprofen.  These headaches feel different than the migraines and are less severe.  She has history of left-sided Bell's palsy as well.  She sees Dr. Diaz in the muscular dystrophy clinic at the [facility], typically annually.    She has clusters of 4-5 days of her migraines per month.    She did have a couple of migraines over the past year that did not respond to the rizatriptan but most of the time they do.  Says she has looked into having a hysterectomy given the hormonal trigger for her headaches, but she does not have any medical need for this other than the headaches.    She endorses regular eye exams.    Past Medical History:   Diagnosis Date     Herpes      Hypothyroidism      Migraines      Past Surgical History:   Procedure Laterality Date     BIOPSY       GYN SURGERY         acyclovir (ZOVIRAX) 400 MG tablet, Take 400 mg by mouth daily as needed  ibuprofen (ADVIL/MOTRIN) 200 MG tablet, Take 1-2 tablets by mouth  "every 4 hours as needed  levothyroxine (SYNTHROID/LEVOTHROID) 150 MCG tablet, Take 175 mcg by mouth daily     No current facility-administered medications on file prior to visit.    Allergies   Allergen Reactions     Amoxicillin Rash     Other reaction(s): hives        Problem (# of Occurrences) Relation (Name,Age of Onset)    Alcoholism (1) Father    Breast Cancer (1) Paternal Aunt    No Known Problems (1) Mother        Social History     Tobacco Use     Smoking status: Current Every Day Smoker     Packs/day: 0.25     Years: 37.00     Pack years: 9.25     Types: Cigarettes, Cigarettes, Cigarettes     Start date: 11/17/2015     Smokeless tobacco: Never Used     Tobacco comment: 4-5 cigarettes a day   Substance Use Topics     Alcohol use: Yes     Alcohol/week: 1.0 standard drink     Types: 1 Standard drinks or equivalent per week     Comment: MONTHLY     Drug use: Never       REVIEW OF SYSTEMS:                                                      10-point review of systems is negative except as mentioned above in HPI.     EXAM:                                                      Physical Exam:   Vitals: /75 (BP Location: Right arm, Patient Position: Sitting)   Pulse 76   Ht 1.613 m (5' 3.5\")   Wt 80.3 kg (177 lb)   BMI 30.86 kg/m    BMI= Body mass index is 30.86 kg/m .  GENERAL: NAD.  HEENT: NC/AT. No TTP of scalp or neck.  CV: RRR. S1, S2.   NECK: No bruits.  PULM: Non-labored breathing.   Neurologic:  MENTAL STATUS: Alert, attentive. Speech is fluent. Normal comprehension.Normal concentration. Adequate fund of knowledge.   CRANIAL NERVES: Discs technically difficult to visualize.  Visual fields intact to confrontation. Pupils equally, round and reactive to light.  Left facial weakness, slight ptosis on that side. EOM full. Hearing intact to conversation. Trapezius strength intact. Palate moves symmetrically. Tongue midline.  MOTOR: 4+/5 in proximal and distal muscle groups of upper extremities.  She has " difficulty fully straightening out the legs bilaterally.  AFOs in place.  Tone and bulk normal.   DTRs: Intact and symmetric in biceps, BR. 1+ at patellae.  SENSATION: Normal light touch throughout.  COORDINATION: Normal finger nose finger bilaterally.  STATION AND GAIT: Casual gait is slightly waddling.  Right hand-dominant.    Relevant Data:  Previous MRI images not available for my review.    ASSESSMENT and PLAN:                                                      Assessment:     ICD-10-CM    1. Intractable chronic migraine without aura and without status migrainosus  G43.719 rizatriptan (MAXALT) 10 MG tablet     ondansetron (ZOFRAN) 4 MG tablet   2. Encounter for long-term (current) use of medications  Z79.899         Ms. Elizalde is a pleasant 51-year-old woman with limb-girdle muscular dystrophy, here for follow-up regarding migraine headaches.  We discussed the pattern of her headaches today and the treatment plan that it was laid out by her prior neurologist.  She feels that the headaches are under reasonable control with just abortive treatment at this time.  She has some questions about the rizatriptan which I answered to the best of my ability.  I do think she would benefit from the addition of an antiemetic since nausea and vomiting are such a big part of her headaches.  We also discussed that hopefully, once she goes through menopause, given the catamenial nature of her headaches hopefully they will dissipate.  I would like to see Allison back in clinic in about 1 year.  She understands and agrees with the plan.    Plan:  -- Continue the rizatriptan as needed for migraine headaches/symptoms.  --Try Zofran (ondansetron) as needed for nausea related to your migraines.  You can take this at the same time as the rizatriptan.  -- Return to Neurology clinic in 1 year.  Please let us know if any concerns arise in the meantime.    Total Time: 40 minutes were spent with the patient and in chart review/documentation  (as described above in Assessment and Plan) /coordinating the care on date of service.    Rizwaan Hernández MD  Neurology    CC: MD Tati Eller software used in the dictation of this note.        Again, thank you for allowing me to participate in the care of your patient.        Sincerely,        Rizwana Hernández MD

## 2022-02-08 DIAGNOSIS — B00.9 HERPES SIMPLEX TYPE 1 INFECTION: Primary | ICD-10-CM

## 2022-02-10 RX ORDER — ACYCLOVIR 400 MG/1
TABLET ORAL
Qty: 30 TABLET | Refills: 1 | Status: SHIPPED | OUTPATIENT
Start: 2022-02-10 | End: 2022-05-27

## 2022-02-10 NOTE — TELEPHONE ENCOUNTER
"Routing refill request to provider for review/approval because:  A break in medication  Patient reported and serum creatinine not on file  Last Written Prescription Date:  2/26/2018  Last Fill Quantity: NA,  # refills: Na   Last office visit provider:  3/4/2021     Requested Prescriptions   Pending Prescriptions Disp Refills     acyclovir (ZOVIRAX) 400 MG tablet       Sig: Take 1 tablet (400 mg) by mouth daily as needed       Antivirals for Herpes Protocol Failed - 2/8/2022 10:37 AM        Failed - Normal serum creatinine on file in past 12 months     Recent Labs   Lab Test 03/04/21  1007   CR 0.53*       Ok to refill medication if creatinine is low          Passed - Patient is age 12 or older        Passed - Recent (12 mo) or future (30 days) visit within the authorizing provider's specialty     Patient has had an office visit with the authorizing provider or a provider within the authorizing providers department within the previous 12 mos or has a future within next 30 days. See \"Patient Info\" tab in inbasket, or \"Choose Columns\" in Meds & Orders section of the refill encounter.              Passed - Medication is active on med list             Brittney Tariq RN 02/09/22 11:14 PM  "

## 2022-04-23 ENCOUNTER — HEALTH MAINTENANCE LETTER (OUTPATIENT)
Age: 52
End: 2022-04-23

## 2022-05-26 ENCOUNTER — TELEPHONE (OUTPATIENT)
Dept: FAMILY MEDICINE | Facility: CLINIC | Age: 52
End: 2022-05-26
Payer: COMMERCIAL

## 2022-05-26 RX ORDER — LEVOTHYROXINE SODIUM 150 UG/1
175 TABLET ORAL DAILY
Status: CANCELLED | OUTPATIENT
Start: 2022-05-26

## 2022-05-26 ASSESSMENT — ENCOUNTER SYMPTOMS
ABDOMINAL PAIN: 0
SHORTNESS OF BREATH: 0
DIZZINESS: 1
CHILLS: 1
WEAKNESS: 1
NERVOUS/ANXIOUS: 0
HEMATURIA: 0
HEADACHES: 0
ARTHRALGIAS: 1
COUGH: 0
BREAST MASS: 0
NAUSEA: 0
DYSURIA: 0
CONSTIPATION: 1
SORE THROAT: 0
FREQUENCY: 0
MYALGIAS: 0
HEARTBURN: 0
DIARRHEA: 1
PARESTHESIAS: 0
EYE PAIN: 0
HEMATOCHEZIA: 0
JOINT SWELLING: 0
PALPITATIONS: 0
FEVER: 0

## 2022-05-26 NOTE — PROGRESS NOTES
SUBJECTIVE:   CC: Caryn Elizalde is an 52 year old woman who presents for preventive health visit.     {Split Bill scripting  The purpose of this visit is to discuss your medical history and prevent health problems before you are sick. You may be responsible for a co-pay, coinsurance, or deductible if your visit today includes services such as checking on a sore throat, having an x-ray or lab test, or treating and evaluating a new or existing condition   Patient has been advised of split billing requirements and indicates understanding: Yes  Healthy Habits:     Getting at least 3 servings of Calcium per day:  Yes    Bi-annual eye exam:  Yes    Dental care twice a year:  Yes    Sleep apnea or symptoms of sleep apnea:  None    Diet:  Regular (no restrictions)    Frequency of exercise:  4-5 days/week    Duration of exercise:  45-60 minutes    Taking medications regularly:  Yes    Medication side effects:  Not applicable    PHQ-2 Total Score: 2    Additional concerns today:  Yes    Periods are irregular.  Gets anxious during periods now.    Migraine prevention medication caused diarrhea.    Fasting.        Today's PHQ-2 Score:   PHQ-2 (  [employer]) 2022   Q1: Little interest or pleasure in doing things 1   Q2: Feeling down, depressed or hopeless 1   PHQ-2 Score 2   PHQ-2 Total Score (12-17 Years)- Positive if 3 or more points; Administer PHQ-A if positive -   Q1: Little interest or pleasure in doing things Several days   Q2: Feeling down, depressed or hopeless Several days   PHQ-2 Score 2       Abuse: Current or Past (Physical, Sexual or Emotional) - No  Do you feel safe in your environment? Yes        Social History     Tobacco Use     Smoking status: Former Smoker     Packs/day: 0.25     Years: 37.00     Pack years: 9.25     Types: Cigarettes     Start date: 2015     Quit date: 3/2/2022     Years since quittin.2     Smokeless tobacco: Never Used   Substance Use Topics     Alcohol use: Yes      "Alcohol/week: 1.0 standard drink     Types: 1 Standard drinks or equivalent per week     Comment: MONTHLY     If you drink alcohol do you typically have >3 drinks per day or >7 drinks per week? No    No flowsheet data found.    Reviewed orders with patient.  Reviewed health maintenance and updated orders accordingly - Yes      Breast Cancer Screening:    Breast CA Risk Assessment (FHS-7) 5/26/2022   Do you have a family history of breast, colon, or ovarian cancer? No / Unknown         Mammogram Screening: Recommended annual mammography  Pertinent mammograms are reviewed under the imaging tab.    History of abnormal Pap smear: NO - age 30-65 PAP every 5 years with negative HPV co-testing recommended     Reviewed and updated as needed this visit by clinical staff   Tobacco  Allergies  Meds                Reviewed and updated as needed this visit by Provider                       Review of Systems  CONSTITUTIONAL: NEGATIVE for fever, chills, change in weight  INTEGUMENTARU/SKIN: NEGATIVE for worrisome rashes, moles or lesions  EYES: NEGATIVE for vision changes or irritation  ENT: NEGATIVE for ear, mouth and throat problems  RESP: NEGATIVE for significant cough or SOB  BREAST: NEGATIVE for masses, tenderness or discharge  CV: NEGATIVE for chest pain, palpitations or peripheral edema  GI: NEGATIVE for nausea, abdominal pain, heartburn, or change in bowel habits  : NEGATIVE for unusual urinary or vaginal symptoms. Periods are regular.  MUSCULOSKELETAL: NEGATIVE for significant arthralgias or myalgia  NEURO: NEGATIVE for weakness, dizziness or paresthesias  PSYCHIATRIC: NEGATIVE for changes in mood or affect     OBJECTIVE:   /78 (Cuff Size: Adult Regular)   Pulse 78   Temp 98.5  F (36.9  C) (Oral)   Ht 1.625 m (5' 3.98\")   Wt 86.8 kg (191 lb 4 oz)   LMP 05/21/2022 (Exact Date)   SpO2 100%   BMI 32.85 kg/m    Physical Exam  Eyes: EOM full, pupils normal, conjunctivae normal  Ears: TM's and canals normal.  " "White PE tube on left  Oropharynx: normal  Neck: supple without adenopathy or thyromegaly  Lungs: normal  Heart: regular rhythm, normal rate, no murmur  Abdomen: no HSM, mass or tenderness  Extremities: Wearing AFO's.  Weak dorsiflexion  Using 4-wheeled walker          ASSESSMENT/PLAN:   Caryn was seen today for physical.    Diagnoses and all orders for this visit:    Routine general medical examination at a health care facility  -     Lipid panel reflex to direct LDL Fasting; Future  -     Lipid panel reflex to direct LDL Fasting    Visit for screening mammogram  -     MA SCREENING DIGITAL BILAT - Future  (s+30); Future    Herpes simplex type 1 infection  -     acyclovir (ZOVIRAX) 400 MG tablet; 3 times daily for 5 days, as needed for out break    Hypothyroidism, unspecified type  -     TSH; Future  -     T4, free; Future  -     TSH  -     T4, free    Fatigue, unspecified type  -     CBC with platelets; Future  -     Comprehensive metabolic panel (BMP + Alb, Alk Phos, ALT, AST, Total. Bili, TP); Future  -     CBC with platelets  -     Comprehensive metabolic panel (BMP + Alb, Alk Phos, ALT, AST, Total. Bili, TP)    Need for vaccination  -     COVID-19,PF,PFIZER (12+ Yrs GRAY LABEL)    Other orders  -     REVIEW OF HEALTH MAINTENANCE PROTOCOL ORDERS      Discussed possible selective serotonin reuptake inhibitor for anxiety.  She declined for now.    Patient has been advised of split billing requirements and indicates understanding: Yes    COUNSELING:  Reviewed preventive health counseling, as reflected in patient instructions       Healthy diet/nutrition    Estimated body mass index is 32.85 kg/m  as calculated from the following:    Height as of this encounter: 1.625 m (5' 3.98\").    Weight as of this encounter: 86.8 kg (191 lb 4 oz).        She reports that she quit smoking about 2 months ago. Her smoking use included cigarettes. She started smoking about 6 years ago. She has a 9.25 pack-year smoking history. " She has never used smokeless tobacco.      Counseling Resources:  ATP IV Guidelines  Pooled Cohorts Equation Calculator  Breast Cancer Risk Calculator  BRCA-Related Cancer Risk Assessment: FHS-7 Tool  FRAX Risk Assessment  ICSI Preventive Guidelines  Dietary Guidelines for Americans, 2010  USDA's MyPlate  ASA Prophylaxis  Lung CA Screening    Osman Monique MD, MD  [facility]

## 2022-05-27 ENCOUNTER — OFFICE VISIT (OUTPATIENT)
Dept: FAMILY MEDICINE | Facility: CLINIC | Age: 52
End: 2022-05-27
Payer: COMMERCIAL

## 2022-05-27 VITALS
HEIGHT: 64 IN | OXYGEN SATURATION: 100 % | TEMPERATURE: 98.5 F | HEART RATE: 78 BPM | WEIGHT: 191.25 LBS | BODY MASS INDEX: 32.65 KG/M2 | SYSTOLIC BLOOD PRESSURE: 114 MMHG | DIASTOLIC BLOOD PRESSURE: 78 MMHG

## 2022-05-27 DIAGNOSIS — E03.9 HYPOTHYROIDISM, UNSPECIFIED TYPE: ICD-10-CM

## 2022-05-27 DIAGNOSIS — R53.83 FATIGUE, UNSPECIFIED TYPE: ICD-10-CM

## 2022-05-27 DIAGNOSIS — Z12.31 VISIT FOR SCREENING MAMMOGRAM: ICD-10-CM

## 2022-05-27 DIAGNOSIS — Z00.00 ROUTINE GENERAL MEDICAL EXAMINATION AT A HEALTH CARE FACILITY: Primary | ICD-10-CM

## 2022-05-27 DIAGNOSIS — Z23 NEED FOR VACCINATION: ICD-10-CM

## 2022-05-27 DIAGNOSIS — B00.9 HERPES SIMPLEX TYPE 1 INFECTION: ICD-10-CM

## 2022-05-27 LAB
ALBUMIN SERPL-MCNC: 3.8 G/DL (ref 3.5–5)
ALP SERPL-CCNC: 68 U/L (ref 45–120)
ALT SERPL W P-5'-P-CCNC: 108 U/L (ref 0–45)
ANION GAP SERPL CALCULATED.3IONS-SCNC: 10 MMOL/L (ref 5–18)
AST SERPL W P-5'-P-CCNC: 104 U/L (ref 0–40)
BILIRUB SERPL-MCNC: 0.4 MG/DL (ref 0–1)
BUN SERPL-MCNC: 8 MG/DL (ref 8–22)
CALCIUM SERPL-MCNC: 9.3 MG/DL (ref 8.5–10.5)
CHLORIDE BLD-SCNC: 103 MMOL/L (ref 98–107)
CHOLEST SERPL-MCNC: 189 MG/DL
CO2 SERPL-SCNC: 26 MMOL/L (ref 22–31)
CREAT SERPL-MCNC: 0.55 MG/DL (ref 0.6–1.1)
ERYTHROCYTE [DISTWIDTH] IN BLOOD BY AUTOMATED COUNT: 12.2 % (ref 10–15)
FASTING STATUS PATIENT QL REPORTED: YES
GFR SERPL CREATININE-BSD FRML MDRD: >90 ML/MIN/1.73M2
GLUCOSE BLD-MCNC: 95 MG/DL (ref 70–125)
HCT VFR BLD AUTO: 39 % (ref 35–47)
HDLC SERPL-MCNC: 68 MG/DL
HGB BLD-MCNC: 13.2 G/DL (ref 11.7–15.7)
LDLC SERPL CALC-MCNC: 102 MG/DL
MCH RBC QN AUTO: 32.1 PG (ref 26.5–33)
MCHC RBC AUTO-ENTMCNC: 33.8 G/DL (ref 31.5–36.5)
MCV RBC AUTO: 95 FL (ref 78–100)
PLATELET # BLD AUTO: 354 10E3/UL (ref 150–450)
POTASSIUM BLD-SCNC: 4.2 MMOL/L (ref 3.5–5)
PROT SERPL-MCNC: 6.8 G/DL (ref 6–8)
RBC # BLD AUTO: 4.11 10E6/UL (ref 3.8–5.2)
SODIUM SERPL-SCNC: 139 MMOL/L (ref 136–145)
T4 FREE SERPL-MCNC: 1.36 NG/DL (ref 0.7–1.8)
TRIGL SERPL-MCNC: 94 MG/DL
TSH SERPL DL<=0.005 MIU/L-ACNC: 3.16 UIU/ML (ref 0.3–5)
WBC # BLD AUTO: 4.3 10E3/UL (ref 4–11)

## 2022-05-27 PROCEDURE — 84443 ASSAY THYROID STIM HORMONE: CPT | Performed by: FAMILY MEDICINE

## 2022-05-27 PROCEDURE — 99396 PREV VISIT EST AGE 40-64: CPT | Mod: 25 | Performed by: FAMILY MEDICINE

## 2022-05-27 PROCEDURE — 91305 COVID-19,PF,PFIZER (12+ YRS): CPT | Performed by: FAMILY MEDICINE

## 2022-05-27 PROCEDURE — 80061 LIPID PANEL: CPT | Performed by: FAMILY MEDICINE

## 2022-05-27 PROCEDURE — 0054A COVID-19,PF,PFIZER (12+ YRS): CPT | Performed by: FAMILY MEDICINE

## 2022-05-27 PROCEDURE — 80053 COMPREHEN METABOLIC PANEL: CPT | Performed by: FAMILY MEDICINE

## 2022-05-27 PROCEDURE — 85027 COMPLETE CBC AUTOMATED: CPT | Performed by: FAMILY MEDICINE

## 2022-05-27 PROCEDURE — 36415 COLL VENOUS BLD VENIPUNCTURE: CPT | Performed by: FAMILY MEDICINE

## 2022-05-27 PROCEDURE — 84439 ASSAY OF FREE THYROXINE: CPT | Performed by: FAMILY MEDICINE

## 2022-05-27 RX ORDER — ACYCLOVIR 400 MG/1
TABLET ORAL
Qty: 30 TABLET | Refills: 11 | Status: SHIPPED | OUTPATIENT
Start: 2022-05-27 | End: 2023-06-19

## 2022-05-31 ENCOUNTER — ANCILLARY PROCEDURE (OUTPATIENT)
Dept: MAMMOGRAPHY | Facility: CLINIC | Age: 52
End: 2022-05-31
Payer: COMMERCIAL

## 2022-05-31 DIAGNOSIS — Z12.31 VISIT FOR SCREENING MAMMOGRAM: ICD-10-CM

## 2022-05-31 PROCEDURE — 77067 SCR MAMMO BI INCL CAD: CPT | Mod: TC | Performed by: RADIOLOGY

## 2022-06-06 ENCOUNTER — ANCILLARY PROCEDURE (OUTPATIENT)
Dept: MAMMOGRAPHY | Facility: CLINIC | Age: 52
End: 2022-06-06
Attending: FAMILY MEDICINE
Payer: COMMERCIAL

## 2022-06-06 DIAGNOSIS — N64.89 BREAST ASYMMETRY: ICD-10-CM

## 2022-06-06 PROCEDURE — 76642 ULTRASOUND BREAST LIMITED: CPT | Mod: LT

## 2022-07-13 ENCOUNTER — TELEPHONE (OUTPATIENT)
Dept: FAMILY MEDICINE | Facility: CLINIC | Age: 52
End: 2022-07-13

## 2022-07-13 NOTE — TELEPHONE ENCOUNTER
Central Prior Authorization Team  Phone: [phone]    PA Initiation    Medication:   Insurance Company: Other (see comments)Comment:  RXPREFERRED [phone]  Pharmacy Filling the Rx: [facility] #[id number] [place], [place] - [address] AT [facility]  Filling Pharmacy Phone: [phone]  Filling Pharmacy Fax:    Start Date: 7/13/2022

## 2022-07-13 NOTE — TELEPHONE ENCOUNTER
Prior Authorization Request  Who's requesting:  PHARMACY   Pharmacy Name and Location: [facility] #[id number] [facility] [address] AT [facility] OF [facility]  Medication Name: levothyroxine (SYNTHROID/LEVOTHROID) 150 MCG tablet  Insurance Plan: [facility] OUT OF STATE  Insurance Member ID Number:  [id number]   CoverMyMeds Key: N/A   Informed patient that prior authorizations can take up to 10 business days for response:   Yes  Okay to leave a detailed message: No

## 2022-07-15 NOTE — TELEPHONE ENCOUNTER
Central Prior Authorization Team  Phone: [phone]    Prior Authorization Not Needed per Insurance    Medication: levothyroxine (SYNTHROID/LEVOTHROID) 150 MCG tablet-NOT NEEDED   Insurance Company: Other (see comments)Comment:  RXPREFERRED [phone]  Expected CoPay:      Pharmacy Filling the Rx: [facility] #[id number] [address] AT [facility]  Pharmacy Notified: Yes  Patient Notified: Yes

## 2022-10-18 DIAGNOSIS — E03.9 HYPOTHYROIDISM, UNSPECIFIED TYPE: Primary | ICD-10-CM

## 2022-10-18 RX ORDER — LEVOTHYROXINE SODIUM 150 UG/1
150 TABLET ORAL DAILY
Qty: 90 TABLET | Refills: 3 | Status: SHIPPED | OUTPATIENT
Start: 2022-10-18 | End: 2023-06-02

## 2022-10-29 ENCOUNTER — HEALTH MAINTENANCE LETTER (OUTPATIENT)
Age: 52
End: 2022-10-29

## 2022-12-08 ENCOUNTER — TELEPHONE (OUTPATIENT)
Dept: NEUROLOGY | Facility: CLINIC | Age: 52
End: 2022-12-08

## 2022-12-08 DIAGNOSIS — G43.719 INTRACTABLE CHRONIC MIGRAINE WITHOUT AURA AND WITHOUT STATUS MIGRAINOSUS: ICD-10-CM

## 2022-12-08 RX ORDER — RIZATRIPTAN BENZOATE 10 MG/1
TABLET ORAL
Qty: 18 TABLET | Refills: 2 | Status: SHIPPED | OUTPATIENT
Start: 2022-12-08 | End: 2023-04-07

## 2022-12-08 NOTE — TELEPHONE ENCOUNTER
[facility] sent a refill request for Rizatriptan 10 mg 1 tab PRN may repeat after 2 hours. Max 2 tabs in 24 hrs # 18

## 2022-12-08 NOTE — TELEPHONE ENCOUNTER
RN approved romel refill for 90 days. Pt has f/u scheduled 4/2023. LOV 11/29/21.     Dinorah Kirby RN, BSN  [facility]    Signed Prescriptions:                        Disp   Refills    rizatriptan (MAXALT) 10 MG tablet          18 tab*2        Sig: TAKE 1 TABLET BY MOUTH AS NEEDED FOR MIGRAINE, MAY           REPEAT AFTER 2 HOURS AS NEEDED. MAX 2 DOSES IN 24           HOURS  Authorizing Provider: MICHEL AREVALO  Ordering User: DINORAH KIRBY

## 2023-04-07 ENCOUNTER — OFFICE VISIT (OUTPATIENT)
Dept: NEUROLOGY | Facility: CLINIC | Age: 53
End: 2023-04-07
Payer: COMMERCIAL

## 2023-04-07 VITALS
WEIGHT: 175.4 LBS | HEART RATE: 79 BPM | SYSTOLIC BLOOD PRESSURE: 124 MMHG | BODY MASS INDEX: 30.13 KG/M2 | DIASTOLIC BLOOD PRESSURE: 79 MMHG

## 2023-04-07 DIAGNOSIS — G43.719 INTRACTABLE CHRONIC MIGRAINE WITHOUT AURA AND WITHOUT STATUS MIGRAINOSUS: Primary | ICD-10-CM

## 2023-04-07 DIAGNOSIS — Z79.899 ENCOUNTER FOR LONG-TERM (CURRENT) USE OF MEDICATIONS: ICD-10-CM

## 2023-04-07 PROCEDURE — 99213 OFFICE O/P EST LOW 20 MIN: CPT | Performed by: PSYCHIATRY & NEUROLOGY

## 2023-04-07 RX ORDER — RIZATRIPTAN BENZOATE 10 MG/1
TABLET ORAL
Qty: 18 TABLET | Refills: 11 | Status: SHIPPED | OUTPATIENT
Start: 2023-04-07 | End: 2024-04-09

## 2023-04-07 NOTE — NURSING NOTE
"Caryn Elizalde is a 53 year old female who presents for:  Chief Complaint   Patient presents with     Headache     Migraines has gotten worse; due to hormones; but its tolerable since episodes are sporadically         Initial Vitals:  /79   Pulse 79   Wt 79.6 kg (175 lb 6.4 oz)   BMI 30.13 kg/m   Estimated body mass index is 30.13 kg/m  as calculated from the following:    Height as of 5/27/22: 1.625 m (5' 3.98\").    Weight as of this encounter: 79.6 kg (175 lb 6.4 oz).. Body surface area is 1.9 meters squared. BP completed using cuff size: wrist cuff    Kevin Hou  "

## 2023-04-07 NOTE — PROGRESS NOTES
ESTABLISHED PATIENT NEUROLOGY NOTE    DATE OF VISIT: 4/7/2023  MRN: [id number]  PATIENT NAME: Caryn Elizalde  YOB: 1970    Chief Complaint   Patient presents with     Headache     Migraines has gotten worse; due to hormones; but its tolerable since episodes are sporadically      SUBJECTIVE:                                                      HISTORY OF PRESENT ILLNESS:  Caryn is here for follow up regarding migraine headaches.    I met patient for initial consultation as a transfer of care about 16 months ago.  History as previously documented by me (11.29.21):   She was previously followed by Dr. Maldonado in this clinic. When he first met the patient in spring 2020, she was reporting significant migraines. Brain MRI had been unremarkable. He increased her rizatriptan from 5 to 10 mg at that time and also started her on topiramate. Unfortunately she developed constipation and diarrhea, difficulty sleeping and paresthesias on the topiramate. They decided to taper her down to a lower 25 mg daily dose, and she reported significant improvement of the side effect symptoms. She had noted that the topiramate had been helpful in terms of preventing the headaches. Documentation indicates that she also tends to have migraines around the time of menstruation. When she spoke with Dr. Maldonado in July of last year, their plan was to continue rizatriptan and occasional ibuprofen or Excedrin. She was not interested in starting a new preventative medication at that time though they did discuss the options of Depakote or one of the new CGRP antibody medications.  She does not appear to be on the Topamax any longer.     Allison tells me that the pattern of her headaches has changed. They used to start during menstruation, and last a few days afterwards. In the last year they tend to occur a few days before her period. A lot of times the headaches wake her from sleep, starting in the back of her head and  migrating forward. She gets severe nausea with her headaches and if untreated will have vomiting.  She endorses sensitivity to light, visual changes described as squiggles.  She thinks Dr. Maldonado might have prescribed her something for nausea in the past but she never took it.  She has not tried any other triptans or other medications for prevention, besides the Topamax.  She says that she had trouble with side effects even on the low dose of the latter.    The rizatriptan also helps the most if she is able to lay down and rest. She does often have to take a second dose, though not always.  Typically she will take the second dose later in the day if headache symptoms persist.     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 A different kind of headache that responds to ibuprofen.  These headaches feel different than the migraines and are less severe.  She has history of left-sided Bell's palsy as well.  She sees Dr. Diaz in the muscular dystrophy clinic at the [place], typically annually.    She has clusters of 4-5 days of her migraines per month.     She did have a couple of migraines over the past year that did not respond to the rizatriptan but most of the time they do.  Says she has looked into having a hysterectomy given the hormonal trigger for her headaches, but she does not have any medical need for this other than the headaches.     She endorses regular eye exams.    We planned to continue the rizatriptan as needed.  I also added Zofran for the nausea she has with her migraines.    Allison says that she is going through some hormonal/perimenopausal changes, so her headaches are less predictable.  Typically one dose of the rizatriptan taken right away helps, along with rest in a dark room. Occasionally she takes a second dose.  If she is having a cluster of her migraines and takes the rizatriptan multiple days in a  row, this does lead to some diarrhea but otherwise she is not noticing any side effects.  She thinks this is tolerable given the relief she gets from a headache standpoint.      She took the Zofran just a couple of times.  Typically the rizatriptan works well enough that she does not develop the nausea.    No change in headache characteristics.    CURRENT MEDICATIONS:   acyclovir (ZOVIRAX) 400 MG tablet, 3 times daily for 5 days, as needed for out break  ibuprofen (ADVIL/MOTRIN) 200 MG tablet, Take 1-2 tablets by mouth every 4 hours as needed  levothyroxine (SYNTHROID/LEVOTHROID) 150 MCG tablet, Take 1 tablet (150 mcg) by mouth daily  levothyroxine (SYNTHROID/LEVOTHROID) 150 MCG tablet, Take 1 tablet (150 mcg) by mouth daily    No current facility-administered medications on file prior to visit.      RECENT DIAGNOSTIC STUDIES:   Labs: No results found for any visits on 04/07/23.    REVIEW OF SYSTEMS:                                                      10-point review of systems is negative except as mentioned above in HPI.    EXAM:                                                      Physical Exam:   Vitals: /79   Pulse 79   Wt 79.6 kg (175 lb 6.4 oz)   BMI 30.13 kg/m    BMI= Body mass index is 30.13 kg/m .  GENERAL: NAD.  HEENT: NC/AT.  CV: RRR. S1, S2.   NECK: No bruits.  PULM: Non-labored breathing.   Focused Neurologic:  MENTAL STATUS: Alert, attentive. Speech is fluent. Normal comprehension.Normal concentration. Adequate fund of knowledge.   CRANIAL NERVES: Discs technically difficult to visualize.  Visual fields intact to confrontation. Pupils equally, round and reactive to light.  Left facial weakness, slight ptosis on that side. EOM full. Hearing intact to conversation. Trapezius strength intact. Palate moves symmetrically. Tongue midline.  MOTOR: 4+/5 in proximal and distal muscle groups of upper extremities.  She has difficulty fully straightening out the legs bilaterally.  AFOs in place.  Tone  and bulk normal.   SENSATION: Normal light touch throughout.  STATION AND GAIT: Casual gait is slightly waddling.  Right hand-dominant.    ASSESSMENT and PLAN:                                                      Assessment:    ICD-10-CM    1. Intractable chronic migraine without aura and without status migrainosus  G43.719 rizatriptan (MAXALT) 10 MG tablet      2. Encounter for long-term (current) use of medications  Z79.899           Ms. Elizalde is a pleasant 53-year-old woman here for follow-up regarding migraine headaches.  She is going through some hormonal changes so her headaches have been a little bit less predictable.  Regardless, she feels that things are under adequate control with abortive therapy.  We did discuss that since her headaches always start in the occiput, if they were to become more, we could try occipital nerve blocks.  We will plan to follow-up again in 1 year.  Allison agrees and will keep me posted on any changes.    Plan:  -- Continue the rizatriptan as needed for your migraines.  -- Return to neurology clinic in 1 year.  Please let us know if the migraines worsen or change in the meantime.    Total Time: 20 minutes were spent with the patient and in chart review/documentation (as described above in Assessment and Plan)/coordinating the care on date of service.    Rizwana Hernández MD  Neurology    Dragon software used in the dictation of this note.

## 2023-04-07 NOTE — LETTER
4/7/2023         RE: Caryn Elizalde  [address]  [place] [address]        Dear Colleague,    Thank you for referring your patient, Caryn Elizalde, to the [facility] [place]. Please see a copy of my visit note below.    ESTABLISHED PATIENT NEUROLOGY NOTE    DATE OF VISIT: 4/7/2023  MRN: [id number]  PATIENT NAME: Caryn Elizalde  YOB: 1970    Chief Complaint   Patient presents with     Headache     Migraines has gotten worse; due to hormones; but its tolerable since episodes are sporadically      SUBJECTIVE:                                                      HISTORY OF PRESENT ILLNESS:  Caryn is here for follow up regarding migraine headaches.    I met patient for initial consultation as a transfer of care about 16 months ago.  History as previously documented by me (11.29.21):   She was previously followed by Dr. Maldonado in this clinic. When he first met the patient in spring 2020, she was reporting significant migraines. Brain MRI had been unremarkable. He increased her rizatriptan from 5 to 10 mg at that time and also started her on topiramate. Unfortunately she developed constipation and diarrhea, difficulty sleeping and paresthesias on the topiramate. They decided to taper her down to a lower 25 mg daily dose, and she reported significant improvement of the side effect symptoms. She had noted that the topiramate had been helpful in terms of preventing the headaches. Documentation indicates that she also tends to have migraines around the time of menstruation. When she spoke with Dr. Maldonado in July of last year, their plan was to continue rizatriptan and occasional ibuprofen or Excedrin. She was not interested in starting a new preventative medication at that time though they did discuss the options of Depakote or one of the new CGRP antibody medications.  She does not appear to be on the Topamax any longer.     Allison tells me that the pattern  of her headaches has changed. They used to start during menstruation, and last a few days afterwards. In the last year they tend to occur a few days before her period. A lot of times the headaches wake her from sleep, starting in the back of her head and migrating forward. She gets severe nausea with her headaches and if untreated will have vomiting.  She endorses sensitivity to light, visual changes described as squiggles.  She thinks Dr. Maldonado might have prescribed her something for nausea in the past but she never took it.  She has not tried any other triptans or other medications for prevention, besides the Topamax.  She says that she had trouble with side effects even on the low dose of the latter.    The rizatriptan also helps the most if she is able to lay down and rest. She does often have to take a second dose, though not always.  Typically she will take the second dose later in the day if headache symptoms persist.     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 A different kind of headache that responds to ibuprofen.  These headaches feel different than the migraines and are less severe.  She has history of left-sided Bell's palsy as well.  She sees Dr. Diaz in the muscular dystrophy clinic at the [place], typically annually.    She has clusters of 4-5 days of her migraines per month.     She did have a couple of migraines over the past year that did not respond to the rizatriptan but most of the time they do.  Says she has looked into having a hysterectomy given the hormonal trigger for her headaches, but she does not have any medical need for this other than the headaches.     She endorses regular eye exams.    We planned to continue the rizatriptan as needed.  I also added Zofran for the nausea she has with her migraines.    Allison says that she is going through some hormonal/perimenopausal changes, so her  headaches are less predictable.  Typically one dose of the rizatriptan taken right away helps, along with rest in a dark room. Occasionally she takes a second dose.  If she is having a cluster of her migraines and takes the rizatriptan multiple days in a row, this does lead to some diarrhea but otherwise she is not noticing any side effects.  She thinks this is tolerable given the relief she gets from a headache standpoint.      She took the Zofran just a couple of times.  Typically the rizatriptan works well enough that she does not develop the nausea.    No change in headache characteristics.    CURRENT MEDICATIONS:   acyclovir (ZOVIRAX) 400 MG tablet, 3 times daily for 5 days, as needed for out break  ibuprofen (ADVIL/MOTRIN) 200 MG tablet, Take 1-2 tablets by mouth every 4 hours as needed  levothyroxine (SYNTHROID/LEVOTHROID) 150 MCG tablet, Take 1 tablet (150 mcg) by mouth daily  levothyroxine (SYNTHROID/LEVOTHROID) 150 MCG tablet, Take 1 tablet (150 mcg) by mouth daily    No current facility-administered medications on file prior to visit.      RECENT DIAGNOSTIC STUDIES:   Labs: No results found for any visits on 04/07/23.    REVIEW OF SYSTEMS:                                                      10-point review of systems is negative except as mentioned above in HPI.    EXAM:                                                      Physical Exam:   Vitals: /79   Pulse 79   Wt 79.6 kg (175 lb 6.4 oz)   BMI 30.13 kg/m    BMI= Body mass index is 30.13 kg/m .  GENERAL: NAD.  HEENT: NC/AT.  CV: RRR. S1, S2.   NECK: No bruits.  PULM: Non-labored breathing.   Focused Neurologic:  MENTAL STATUS: Alert, attentive. Speech is fluent. Normal comprehension.Normal concentration. Adequate fund of knowledge.   CRANIAL NERVES: Discs technically difficult to visualize.  Visual fields intact to confrontation. Pupils equally, round and reactive to light.  Left facial weakness, slight ptosis on that side. EOM full. Hearing  intact to conversation. Trapezius strength intact. Palate moves symmetrically. Tongue midline.  MOTOR: 4+/5 in proximal and distal muscle groups of upper extremities.  She has difficulty fully straightening out the legs bilaterally.  AFOs in place.  Tone and bulk normal.   SENSATION: Normal light touch throughout.  STATION AND GAIT: Casual gait is slightly waddling.  Right hand-dominant.    ASSESSMENT and PLAN:                                                      Assessment:    ICD-10-CM    1. Intractable chronic migraine without aura and without status migrainosus  G43.719 rizatriptan (MAXALT) 10 MG tablet      2. Encounter for long-term (current) use of medications  Z79.899           Ms. Elizalde is a pleasant 53-year-old woman here for follow-up regarding migraine headaches.  She is going through some hormonal changes so her headaches have been a little bit less predictable.  Regardless, she feels that things are under adequate control with abortive therapy.  We did discuss that since her headaches always start in the occiput, if they were to become more, we could try occipital nerve blocks.  We will plan to follow-up again in 1 year.  Allison agrees and will keep me posted on any changes.    Plan:  -- Continue the rizatriptan as needed for your migraines.  -- Return to neurology clinic in 1 year.  Please let us know if the migraines worsen or change in the meantime.    Total Time: 20 minutes were spent with the patient and in chart review/documentation (as described above in Assessment and Plan)/coordinating the care on date of service.    Rizwana Hernández MD  Neurology    Dragon software used in the dictation of this note.                        Again, thank you for allowing me to participate in the care of your patient.        Sincerely,        Rizwana Hernández MD

## 2023-04-07 NOTE — PATIENT INSTRUCTIONS
Plan:  -- Continue the rizatriptan as needed for your migraines.  -- Return to neurology clinic in 1 year.  Please let us know if the migraines worsen or change in the meantime.

## 2023-04-27 ENCOUNTER — TELEPHONE (OUTPATIENT)
Dept: FAMILY MEDICINE | Facility: CLINIC | Age: 53
End: 2023-04-27
Payer: COMMERCIAL

## 2023-04-27 NOTE — TELEPHONE ENCOUNTER
Patient Quality Outreach    Patient is due for the following:   Cervical Cancer Screening - PAP Needed    Next Steps:   Patient declined follow up at this time.    Type of outreach:    Phone, spoke to patient/parent. Pt declined, but ststes she will schedule a PAP at a clinic closer to her new home.    Next Steps:  Reach out within 90 days via Phone.    Max number of attempts reached: unsure. Will try again in 90 days if patient still on fail list.    Questions for provider review:    None     AGNES JEFFERSON MA  Chart routed to Care Team.

## 2023-05-04 ENCOUNTER — OFFICE VISIT (OUTPATIENT)
Dept: FAMILY MEDICINE | Facility: CLINIC | Age: 53
End: 2023-05-04
Payer: COMMERCIAL

## 2023-05-04 VITALS
RESPIRATION RATE: 16 BRPM | OXYGEN SATURATION: 98 % | BODY MASS INDEX: 32.6 KG/M2 | DIASTOLIC BLOOD PRESSURE: 78 MMHG | SYSTOLIC BLOOD PRESSURE: 120 MMHG | TEMPERATURE: 98.6 F | HEART RATE: 81 BPM | WEIGHT: 184 LBS | HEIGHT: 63 IN

## 2023-05-04 DIAGNOSIS — R30.0 DYSURIA: Primary | ICD-10-CM

## 2023-05-04 DIAGNOSIS — E06.3 HYPOTHYROIDISM DUE TO HASHIMOTO'S THYROIDITIS: ICD-10-CM

## 2023-05-04 LAB
ALBUMIN UR-MCNC: NEGATIVE MG/DL
APPEARANCE UR: CLEAR
BACTERIA #/AREA URNS HPF: ABNORMAL /HPF
BILIRUB UR QL STRIP: NEGATIVE
COLOR UR AUTO: YELLOW
GLUCOSE UR STRIP-MCNC: NEGATIVE MG/DL
HGB UR QL STRIP: NEGATIVE
KETONES UR STRIP-MCNC: NEGATIVE MG/DL
LEUKOCYTE ESTERASE UR QL STRIP: ABNORMAL
NITRATE UR QL: NEGATIVE
PH UR STRIP: 5.5 [PH] (ref 5–7)
RBC #/AREA URNS AUTO: ABNORMAL /HPF
SP GR UR STRIP: 1.02 (ref 1–1.03)
SQUAMOUS #/AREA URNS AUTO: ABNORMAL /LPF
TSH SERPL DL<=0.005 MIU/L-ACNC: 1.25 MU/L (ref 0.4–4)
UROBILINOGEN UR STRIP-ACNC: 0.2 E.U./DL
WBC #/AREA URNS AUTO: ABNORMAL /HPF

## 2023-05-04 PROCEDURE — 81001 URINALYSIS AUTO W/SCOPE: CPT | Performed by: FAMILY MEDICINE

## 2023-05-04 PROCEDURE — 36415 COLL VENOUS BLD VENIPUNCTURE: CPT | Performed by: FAMILY MEDICINE

## 2023-05-04 PROCEDURE — 87086 URINE CULTURE/COLONY COUNT: CPT | Performed by: FAMILY MEDICINE

## 2023-05-04 PROCEDURE — 84443 ASSAY THYROID STIM HORMONE: CPT | Performed by: FAMILY MEDICINE

## 2023-05-04 PROCEDURE — 99213 OFFICE O/P EST LOW 20 MIN: CPT | Performed by: FAMILY MEDICINE

## 2023-05-04 RX ORDER — NITROFURANTOIN 25; 75 MG/1; MG/1
100 CAPSULE ORAL 2 TIMES DAILY
Qty: 10 CAPSULE | Refills: 0 | Status: SHIPPED | OUTPATIENT
Start: 2023-05-04 | End: 2023-06-02

## 2023-05-04 ASSESSMENT — PAIN SCALES - GENERAL: PAINLEVEL: NO PAIN (0)

## 2023-05-04 NOTE — PROGRESS NOTES
"  Assessment & Plan     Dysuria  Treat, await culture  - UA Macroscopic with reflex to Microscopic and Culture; Future  - UA Macroscopic with reflex to Microscopic and Culture  - UA Microscopic with Reflex to Culture  - Urine Culture  - nitroFURantoin macrocrystal-monohydrate (MACROBID) 100 MG capsule; Take 1 capsule (100 mg) by mouth 2 times daily    Hypothyroidism due to Hashimoto's thyroiditis  Pt feels like it is off again. Feels the way she did when she first started on meds  - TSH WITH FREE T4 REFLEX; Future             Nicotine/Tobacco Cessation:  She reports that she has been smoking cigarettes. She started smoking about 7 years ago. She has a 9.25 pack-year smoking history. She has never used smokeless tobacco.  Nicotine/Tobacco Cessation Plan:   Information offered: Patient not interested at this time      BMI:   Estimated body mass index is 32.59 kg/m  as calculated from the following:    Height as of this encounter: 1.6 m (5' 3\").    Weight as of this encounter: 83.5 kg (184 lb).   Weight management plan: Discussed healthy diet and exercise guidelines        Anne-Marie Jackson MD  [facility]   Allison is a 53 year old, presenting for the following health issues:  UTI        5/4/2023     8:25 AM   Additional Questions   Roomed by keith     UTI    History of Present Illness       Reason for visit:  Exhaustion  Symptom onset:  1-2 weeks ago    She eats 4 or more servings of fruits and vegetables daily.She consumes 0 sweetened beverage(s) daily.She exercises with enough effort to increase her heart rate 30 to 60 minutes per day.  She exercises with enough effort to increase her heart rate 3 or less days per week.   She is taking medications regularly.     Pt with some intermittent urinary urgency and increased fatigue over past 2 weeks  She would also like her thyroid checked due to this fatigue. In the past she has felt this way when it was off      Review of Systems " "  Constitutional, HEENT, cardiovascular, pulmonary, gi and gu systems are negative, except as otherwise noted.      Objective    /78   Pulse 81   Temp 98.6  F (37  C) (Oral)   Resp 16   Ht 1.6 m (5' 3\")   Wt 83.5 kg (184 lb)   LMP 04/19/2023   SpO2 98%   BMI 32.59 kg/m    Body mass index is 32.59 kg/m .  Physical Exam   GENERAL: healthy, alert and no distress  RESP: lungs clear to auscultation - no rales, rhonchi or wheezes  CV: regular rate and rhythm, normal S1 S2, no S3 or S4, no murmur, click or rub, no peripheral edema and peripheral pulses strong    Results for orders placed or performed in visit on 05/04/23 (from the past 24 hour(s))   UA Macroscopic with reflex to Microscopic and Culture    Specimen: Urine, Clean Catch   Result Value Ref Range    Color Urine Yellow Colorless, Straw, Light Yellow, Yellow    Appearance Urine Clear Clear    Glucose Urine Negative Negative mg/dL    Bilirubin Urine Negative Negative    Ketones Urine Negative Negative mg/dL    Specific Gravity Urine 1.025 1.003 - 1.035    Blood Urine Negative Negative    pH Urine 5.5 5.0 - 7.0    Protein Albumin Urine Negative Negative mg/dL    Urobilinogen Urine 0.2 0.2, 1.0 E.U./dL    Nitrite Urine Negative Negative    Leukocyte Esterase Urine Trace (A) Negative   UA Microscopic with Reflex to Culture   Result Value Ref Range    Bacteria Urine Few (A) None Seen /HPF    RBC Urine None Seen 0-2 /HPF /HPF    WBC Urine 10-25 (A) 0-5 /HPF /HPF    Squamous Epithelials Urine Moderate (A) None Seen /LPF                   "

## 2023-05-06 ENCOUNTER — MYC MEDICAL ADVICE (OUTPATIENT)
Dept: FAMILY MEDICINE | Facility: CLINIC | Age: 53
End: 2023-05-06
Payer: COMMERCIAL

## 2023-05-06 LAB — BACTERIA UR CULT: NORMAL

## 2023-06-02 ENCOUNTER — OFFICE VISIT (OUTPATIENT)
Dept: FAMILY MEDICINE | Facility: CLINIC | Age: 53
End: 2023-06-02
Payer: COMMERCIAL

## 2023-06-02 VITALS
BODY MASS INDEX: 32.6 KG/M2 | DIASTOLIC BLOOD PRESSURE: 82 MMHG | SYSTOLIC BLOOD PRESSURE: 120 MMHG | HEIGHT: 63 IN | TEMPERATURE: 98.4 F | OXYGEN SATURATION: 96 % | RESPIRATION RATE: 16 BRPM | HEART RATE: 97 BPM | WEIGHT: 184 LBS

## 2023-06-02 DIAGNOSIS — R53.83 FATIGUE, UNSPECIFIED TYPE: ICD-10-CM

## 2023-06-02 DIAGNOSIS — E06.3 HYPOTHYROIDISM DUE TO HASHIMOTO'S THYROIDITIS: ICD-10-CM

## 2023-06-02 DIAGNOSIS — Z12.31 VISIT FOR SCREENING MAMMOGRAM: ICD-10-CM

## 2023-06-02 DIAGNOSIS — Z72.0 TOBACCO USER: ICD-10-CM

## 2023-06-02 DIAGNOSIS — Z00.00 ROUTINE GENERAL MEDICAL EXAMINATION AT A HEALTH CARE FACILITY: Primary | ICD-10-CM

## 2023-06-02 DIAGNOSIS — E55.9 VITAMIN D DEFICIENCY: ICD-10-CM

## 2023-06-02 DIAGNOSIS — G71.039 LIMB-GIRDLE MUSCULAR DYSTROPHY (H): ICD-10-CM

## 2023-06-02 DIAGNOSIS — Z12.4 SCREENING FOR MALIGNANT NEOPLASM OF CERVIX: ICD-10-CM

## 2023-06-02 DIAGNOSIS — E66.811 CLASS 1 OBESITY WITH BODY MASS INDEX (BMI) OF 32.0 TO 32.9 IN ADULT, UNSPECIFIED OBESITY TYPE, UNSPECIFIED WHETHER SERIOUS COMORBIDITY PRESENT: ICD-10-CM

## 2023-06-02 DIAGNOSIS — Z13.220 LIPID SCREENING: ICD-10-CM

## 2023-06-02 DIAGNOSIS — G51.0 LEFT-SIDED BELL'S PALSY: ICD-10-CM

## 2023-06-02 DIAGNOSIS — Z12.11 COLON CANCER SCREENING: ICD-10-CM

## 2023-06-02 LAB
ALBUMIN SERPL BCG-MCNC: 4.3 G/DL (ref 3.5–5.2)
ALP SERPL-CCNC: 63 U/L (ref 35–104)
ALT SERPL W P-5'-P-CCNC: 93 U/L (ref 10–35)
ANION GAP SERPL CALCULATED.3IONS-SCNC: 13 MMOL/L (ref 7–15)
AST SERPL W P-5'-P-CCNC: 76 U/L (ref 10–35)
BASOPHILS # BLD AUTO: 0 10E3/UL (ref 0–0.2)
BASOPHILS NFR BLD AUTO: 1 %
BILIRUB SERPL-MCNC: 0.3 MG/DL
BUN SERPL-MCNC: 8.2 MG/DL (ref 6–20)
CALCIUM SERPL-MCNC: 9.2 MG/DL (ref 8.6–10)
CHLORIDE SERPL-SCNC: 107 MMOL/L (ref 98–107)
CHOLEST SERPL-MCNC: 187 MG/DL
CREAT SERPL-MCNC: 0.39 MG/DL (ref 0.51–0.95)
DEPRECATED CALCIDIOL+CALCIFEROL SERPL-MC: 18 UG/L (ref 20–75)
DEPRECATED HCO3 PLAS-SCNC: 20 MMOL/L (ref 22–29)
EOSINOPHIL # BLD AUTO: 0.1 10E3/UL (ref 0–0.7)
EOSINOPHIL NFR BLD AUTO: 2 %
ERYTHROCYTE [DISTWIDTH] IN BLOOD BY AUTOMATED COUNT: 12.8 % (ref 10–15)
GFR SERPL CREATININE-BSD FRML MDRD: >90 ML/MIN/1.73M2
GLUCOSE SERPL-MCNC: 94 MG/DL (ref 70–99)
HCT VFR BLD AUTO: 40.4 % (ref 35–47)
HDLC SERPL-MCNC: 67 MG/DL
HGB BLD-MCNC: 13.9 G/DL (ref 11.7–15.7)
IMM GRANULOCYTES # BLD: 0 10E3/UL
IMM GRANULOCYTES NFR BLD: 0 %
LDLC SERPL CALC-MCNC: 107 MG/DL
LYMPHOCYTES # BLD AUTO: 2.2 10E3/UL (ref 0.8–5.3)
LYMPHOCYTES NFR BLD AUTO: 40 %
MCH RBC QN AUTO: 32.1 PG (ref 26.5–33)
MCHC RBC AUTO-ENTMCNC: 34.4 G/DL (ref 31.5–36.5)
MCV RBC AUTO: 93 FL (ref 78–100)
MONOCYTES # BLD AUTO: 0.3 10E3/UL (ref 0–1.3)
MONOCYTES NFR BLD AUTO: 6 %
NEUTROPHILS # BLD AUTO: 2.8 10E3/UL (ref 1.6–8.3)
NEUTROPHILS NFR BLD AUTO: 51 %
NONHDLC SERPL-MCNC: 120 MG/DL
PLATELET # BLD AUTO: 384 10E3/UL (ref 150–450)
POTASSIUM SERPL-SCNC: 4.6 MMOL/L (ref 3.4–5.3)
PROT SERPL-MCNC: 7.1 G/DL (ref 6.4–8.3)
RBC # BLD AUTO: 4.33 10E6/UL (ref 3.8–5.2)
SODIUM SERPL-SCNC: 140 MMOL/L (ref 136–145)
TRIGL SERPL-MCNC: 64 MG/DL
WBC # BLD AUTO: 5.5 10E3/UL (ref 4–11)

## 2023-06-02 PROCEDURE — 99396 PREV VISIT EST AGE 40-64: CPT | Mod: 25 | Performed by: PHYSICIAN ASSISTANT

## 2023-06-02 PROCEDURE — 80061 LIPID PANEL: CPT | Performed by: PHYSICIAN ASSISTANT

## 2023-06-02 PROCEDURE — 36415 COLL VENOUS BLD VENIPUNCTURE: CPT | Performed by: PHYSICIAN ASSISTANT

## 2023-06-02 PROCEDURE — 87624 HPV HI-RISK TYP POOLED RSLT: CPT | Performed by: PHYSICIAN ASSISTANT

## 2023-06-02 PROCEDURE — 90677 PCV20 VACCINE IM: CPT | Performed by: PHYSICIAN ASSISTANT

## 2023-06-02 PROCEDURE — 99213 OFFICE O/P EST LOW 20 MIN: CPT | Mod: 25 | Performed by: PHYSICIAN ASSISTANT

## 2023-06-02 PROCEDURE — 90471 IMMUNIZATION ADMIN: CPT | Performed by: PHYSICIAN ASSISTANT

## 2023-06-02 PROCEDURE — 80053 COMPREHEN METABOLIC PANEL: CPT | Performed by: PHYSICIAN ASSISTANT

## 2023-06-02 PROCEDURE — G0145 SCR C/V CYTO,THINLAYER,RESCR: HCPCS | Performed by: PHYSICIAN ASSISTANT

## 2023-06-02 PROCEDURE — 85025 COMPLETE CBC W/AUTO DIFF WBC: CPT | Performed by: PHYSICIAN ASSISTANT

## 2023-06-02 PROCEDURE — 82306 VITAMIN D 25 HYDROXY: CPT | Performed by: PHYSICIAN ASSISTANT

## 2023-06-02 ASSESSMENT — ENCOUNTER SYMPTOMS
HEARTBURN: 0
HEADACHES: 1
NAUSEA: 0
FREQUENCY: 0
BREAST MASS: 0
PALPITATIONS: 0
HEMATOCHEZIA: 0
ARTHRALGIAS: 1
HEMATURIA: 0
ABDOMINAL PAIN: 0
SHORTNESS OF BREATH: 0
CONSTIPATION: 1
PARESTHESIAS: 0
JOINT SWELLING: 0
FEVER: 0
MYALGIAS: 0
DIARRHEA: 1
NERVOUS/ANXIOUS: 0
DYSURIA: 0
DIZZINESS: 1
WEAKNESS: 1
COUGH: 0
CHILLS: 0
EYE PAIN: 0
SORE THROAT: 0

## 2023-06-02 ASSESSMENT — PAIN SCALES - GENERAL: PAINLEVEL: NO PAIN (0)

## 2023-06-02 NOTE — PROGRESS NOTES
SUBJECTIVE:   CC: Allison is an 53 year old who presents for preventive health visit.     Healthy Habits:    Getting at least 3 servings of Calcium per day:  Yes    Bi-annual eye exam:  NO    Dental care twice a year:  Yes    Sleep apnea or symptoms of sleep apnea:  None    Diet:  Regular (no restrictions)    Frequency of exercise:  2-3 days/week    Duration of exercise:  45-60 minutes    Taking medications regularly:  Yes    Medication side effects:  None    PHQ-2 Total Score:    Additional concerns today:  Yes    Quitting smoking with quit date planned for next week.  Patient did stop smoking last year for 4-1/2 months    Weight issues.  Has been trying to lose weight    Ongoing fatigue over the last 4 months that has been worsening.     Last year was found to have a cyst in the left breast.  Did have ultrasound that showed benign.     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 Increasing weakness over the last month.  Has not seen neurology for her muscular dystrophy in the last 3 years.      Social History     Tobacco Use     Smoking status: Every Day     Packs/day: 0.50     Years: 35.00     Pack years: 17.50     Types: Cigarettes     Start date: 1983     Last attempt to quit: 3/2/2022     Years since quittin.2     Smokeless tobacco: Never   Vaping Use     Vaping status: Never Used   Substance Use Topics     Alcohol use: Yes     Alcohol/week: 1.0 standard drink of alcohol     Types: 1 Standard drinks or equivalent per week     Comment: MONTHLY           2023     8:07 AM   Alcohol Use   Prescreen: >3 drinks/day or >7 drinks/week? No          View : No data to display.              Reviewed orders with patient.  Reviewed health maintenance and updated orders accordingly -       Breast Cancer Screenin/26/2022     1:05 PM 2022     9:52 AM   Breast CA Risk Assessment (FHS-7)   Do you have a family history of breast, colon, or ovarian cancer? No / Unknown No / Unknown  "        Mammogram Screening: Recommended annual mammography  Pertinent mammograms are reviewed under the imaging tab.    History of abnormal Pap smear: NO - age 30- 65 PAP every 3 years recommended     Reviewed and updated as needed this visit by clinical staff   Tobacco  Allergies  Meds  Problems  Med Hx  Surg Hx  Fam Hx          Reviewed and updated as needed this visit by Provider   Tobacco  Allergies  Meds  Problems  Med Hx  Surg Hx  Fam Hx         Past Medical History:   Diagnosis Date     Herpes      Hypothyroidism      Migraines       Past Surgical History:   Procedure Laterality Date     BIOPSY       COLONOSCOPY       GYN SURGERY         Review of Systems   Constitutional: Negative for chills and fever.   HENT: Negative for congestion, ear pain, hearing loss and sore throat.    Eyes: Positive for visual disturbance. Negative for pain.   Respiratory: Negative for cough and shortness of breath.    Cardiovascular: Negative for chest pain, palpitations and peripheral edema.   Gastrointestinal: Positive for constipation and diarrhea. Negative for abdominal pain, heartburn, hematochezia and nausea.   Breasts:  Negative for tenderness, breast mass and discharge.   Genitourinary: Negative for dysuria, frequency, genital sores, hematuria, pelvic pain, urgency, vaginal bleeding and vaginal discharge.   Musculoskeletal: Positive for arthralgias. Negative for joint swelling and myalgias.   Skin: Negative for rash.   Neurological: Positive for dizziness, weakness and headaches. Negative for paresthesias.   Psychiatric/Behavioral: Negative for mood changes. The patient is not nervous/anxious.         OBJECTIVE:   /82   Pulse 97   Temp 98.4  F (36.9  C) (Tympanic)   Resp 16   Ht 1.6 m (5' 3\")   Wt 83.5 kg (184 lb)   LMP 04/19/2023   SpO2 96%   BMI 32.59 kg/m    Physical Exam  GENERAL: AFO in place bilateral lower extremities, alert and no distress  EYES: Eyes grossly normal to inspection, PERRL " and conjunctivae and sclerae normal  HENT: ear canals and TM's normal, nose and mouth without ulcers or lesions  NECK: no adenopathy, no asymmetry, masses, or scars and thyroid normal to palpation  RESP: lungs clear to auscultation - no rales, rhonchi or wheezes  CV: regular rate and rhythm, normal S1 S2, no S3 or S4, no murmur, click or rub, no peripheral edema and peripheral pulses strong  ABDOMEN: soft, nontender, no hepatosplenomegaly, no masses and bowel sounds normal   (female): normal female external genitalia, normal urethral meatus, vaginal mucosa pink, moist, well rugated, and normal cervix/adnexa/uterus without masses or discharge.  Female chaperone present at bedside.  MS: no gross musculoskeletal defects noted, no edema  SKIN: no suspicious lesions or rashes  NEURO: AFO bilateral lower extremities.  Left facial droop (history of Bell's palsy since childhood).  Decreased patellar tendon reflexes.  4/5 hip flexion.   PSYCH: mentation appears normal, affect normal/bright      ASSESSMENT/PLAN:   (Z00.00) Routine general medical examination at a health care facility  (primary encounter diagnosis)  Comment: Here for routine screening examination.  Plan: CBC with platelets and differential,         Comprehensive metabolic panel (BMP + Alb, Alk         Phos, ALT, AST, Total. Bili, TP)          (R53.83) Fatigue, unspecified type  Comment: Patient with ongoing history of fatigue.  Has a history of limb-girdle muscular dystrophy.  Had been told previously to decrease activity by her neurologist as this may be contributing to her overall fatigue.  Patient with history of hypothyroidism.  TSH had been within normal limits in the last month.  Increasing fatigue has been going on over the last few months.  Exact etiology of fatigue is not entirely clear.  Discussed potential vitamin D as well as screening CBC.  Patient was amenable to this.  Plan: Vitamin D Deficiency, CBC with platelets and         differential           (E03.8,  E06.3) Hypothyroidism due to Hashimoto's thyroiditis  Comment: History of hypothyroidism.  TSH rechecked last month was normal.    (G71.039) Limb-girdle muscular dystrophy (H)  Comment: Follows with neurology for muscular dystrophy.  Has not seen her neurologist in the last few years.  Discussed with patient following up with neurologist more closely.     (Z72.0) Tobacco user  Comment: Patient does consume tobacco products.  Is planning on quitting next week.    (E66.9,  Z68.32) Class 1 obesity with body mass index (BMI) of 32.0 to 32.9 in adult, unspecified obesity type, unspecified whether serious comorbidity present  Comment: History of elevated BMI.  Patient is trying to lose weight, however, with increased fatigue she has been having issues with this.    (G51.0) Left-sided Bell's palsy  Comment: Left-sided Bell's palsy.  Remains at baseline.  No worsening or new concerns.    (Z12.31) Visit for screening mammogram  Comment: Mammogram history of cyst left breast.  Patient due for repeat mammogram.  Plan: MA SCREENING DIGITAL BILAT - Future  (s+30)          (Z12.4) Screening for malignant neoplasm of cervix  Comment: Pap testing repeated here.  Female chaperone present at bedside.  Plan: Pap Screen with HPV - recommended age 30 - 65         years           (Z13.220) Lipid screening  Comment: Discussed screening lipids.  Plan: Lipid panel reflex to direct LDL Fasting          (Z12.11) Colon cancer screening  Comment: Last colonoscopy in October 2018.  Recommendation for 5-year repeat.  Plan: Colonoscopy Screening  Referral                  COUNSELING:  Reviewed preventive health counseling, as reflected in patient instructions       Regular exercise       Healthy diet/nutrition       Vision screening       Alcohol Use       Colorectal Cancer Screening        She reports that she has been smoking cigarettes. She started smoking about 40 years ago. She has a 17.50 pack-year smoking history. She  has never used smokeless tobacco.  Nicotine/Tobacco Cessation Plan:   Information offered: Patient not interested at this time          Larry Schneider PA-C  [facility]

## 2023-06-02 NOTE — NURSING NOTE
Prior to immunization administration, verified patients identity using patient s name and date of birth. Please see Immunization Activity for additional information.     Screening Questionnaire for Adult Immunization    Are you sick today?   No   Do you have allergies to medications, food, a vaccine component or latex?   No   Have you ever had a serious reaction after receiving a vaccination?   No   Do you have a long-term health problem with heart, lung, kidney, or metabolic disease (e.g., diabetes), asthma, a blood disorder, no spleen, complement component deficiency, a cochlear implant, or a spinal fluid leak?  Are you on long-term aspirin therapy?   No   Do you have cancer, leukemia, HIV/AIDS, or any other immune system problem?   No   Do you have a parent, brother, or sister with an immune system problem?   No   In the past 3 months, have you taken medications that affect  your immune system, such as prednisone, other steroids, or anticancer drugs; drugs for the treatment of rheumatoid arthritis, Crohn s disease, or psoriasis; or have you had radiation treatments?   No   Have you had a seizure, or a brain or other nervous system problem?   No   During the past year, have you received a transfusion of blood or blood    products, or been given immune (gamma) globulin or antiviral drug?   No   For women: Are you pregnant or is there a chance you could become       pregnant during the next month?   No   Have you received any vaccinations in the past 4 weeks?   No     Immunization questionnaire answers were all negative.      Injection of PCV20 given by SANDRINE BRUNSON MA. Patient instructed to remain in clinic for 15 minutes afterwards, and to report any adverse reactions.     Screening performed by SANDRINE BRUNSON MA on 6/2/2023 at 9:12 AM.

## 2023-06-02 NOTE — PATIENT INSTRUCTIONS
Follow up with your neurologist.           Preventive Health Recommendations  Female Ages 50 - 64    Yearly exam: See your health care provider every year in order to  Review health changes.   Discuss preventive care.    Review your medicines if your doctor has prescribed any.    Get a Pap test every three years (unless you have an abnormal result and your provider advises testing more often).  If you get Pap tests with HPV test, you only need to test every 5 years, unless you have an abnormal result.   You do not need a Pap test if your uterus was removed (hysterectomy) and you have not had cancer.  You should be tested each year for STDs (sexually transmitted diseases) if you're at risk.   Have a mammogram every 1 to 2 years.  Have a colonoscopy at age 50, or have a yearly FIT test (stool test). These exams screen for colon cancer.    Have a cholesterol test every 5 years, or more often if advised.  Have a diabetes test (fasting glucose) every three years. If you are at risk for diabetes, you should have this test more often.   If you are at risk for osteoporosis (brittle bone disease), think about having a bone density scan (DEXA).    Shots: Get a flu shot each year. Get a tetanus shot every 10 years.    Nutrition:   Eat at least 5 servings of fruits and vegetables each day.  Eat whole-grain bread, whole-wheat pasta and brown rice instead of white grains and rice.  Get adequate Calcium and Vitamin D.     Lifestyle  Exercise at least 150 minutes a week (30 minutes a day, 5 days a week). This will help you control your weight and prevent disease.  Limit alcohol to one drink per day.  No smoking.   Wear sunscreen to prevent skin cancer.   See your dentist every six months for an exam and cleaning.  See your eye doctor every 1 to 2 years.

## 2023-06-05 RX ORDER — VITAMIN B COMPLEX
1 TABLET ORAL DAILY
Qty: 90 TABLET | Refills: 1 | Status: SHIPPED | OUTPATIENT
Start: 2023-06-05 | End: 2023-11-30

## 2023-06-06 ENCOUNTER — TELEPHONE (OUTPATIENT)
Dept: FAMILY MEDICINE | Facility: CLINIC | Age: 53
End: 2023-06-06
Payer: COMMERCIAL

## 2023-06-07 LAB
BKR LAB AP GYN ADEQUACY: NORMAL
BKR LAB AP GYN INTERPRETATION: NORMAL
BKR LAB AP HPV REFLEX: NORMAL
BKR LAB AP PREVIOUS ABNORMAL: NORMAL
PATH REPORT.COMMENTS IMP SPEC: NORMAL
PATH REPORT.COMMENTS IMP SPEC: NORMAL
PATH REPORT.RELEVANT HX SPEC: NORMAL

## 2023-06-08 LAB
HUMAN PAPILLOMA VIRUS 16 DNA: NEGATIVE
HUMAN PAPILLOMA VIRUS 18 DNA: NEGATIVE
HUMAN PAPILLOMA VIRUS FINAL DIAGNOSIS: NORMAL
HUMAN PAPILLOMA VIRUS OTHER HR: NEGATIVE

## 2023-06-19 DIAGNOSIS — B00.9 HERPES SIMPLEX TYPE 1 INFECTION: ICD-10-CM

## 2023-06-19 RX ORDER — ACYCLOVIR 400 MG/1
TABLET ORAL
Qty: 30 TABLET | Refills: 11 | Status: SHIPPED | OUTPATIENT
Start: 2023-06-19 | End: 2024-06-17

## 2023-06-20 NOTE — TELEPHONE ENCOUNTER
"Routing refill request to provider for review/approval because:  Labs out of range:  creatinine  Patient needs to be seen because it has been more than 1 year since last office visit???    Last Written Prescription Date:  5/27/2022  Last Fill Quantity: 30,  # refills: 11   Last office visit provider:  6/2/2023     Requested Prescriptions   Pending Prescriptions Disp Refills     acyclovir (ZOVIRAX) 400 MG tablet [Pharmacy Med Name: ACYCLOVIR 400MG TABLETS] 30 tablet 11     Sig: TAKE 1 TABLET BY MOUTH THREE TIMES DAILY FOR 5 DAYS. AS NEEDED FOR OUT BREAK       Antivirals for Herpes Protocol Failed - 6/19/2023 10:27 AM        Failed - Recent (12 mo) or future (30 days) visit within the authorizing provider's specialty     Patient has had an office visit with the authorizing provider or a provider within the authorizing providers department within the previous 12 mos or has a future within next 30 days. See \"Patient Info\" tab in inbasket, or \"Choose Columns\" in Meds & Orders section of the refill encounter.              Failed - Normal serum creatinine on file in past 12 months     Recent Labs   Lab Test 06/02/23  0911   CR 0.39*       Ok to refill medication if creatinine is low          Passed - Patient is age 12 or older        Passed - Medication is active on med list             Brittney Tariq RN 06/19/23 10:12 PM  "

## 2023-06-22 DIAGNOSIS — B00.9 HERPES SIMPLEX TYPE 1 INFECTION: Primary | ICD-10-CM

## 2023-06-22 RX ORDER — FAMCICLOVIR 250 MG/1
TABLET ORAL
Qty: 12 TABLET | Refills: 3 | Status: SHIPPED | OUTPATIENT
Start: 2023-06-22 | End: 2023-06-27

## 2023-06-22 NOTE — TELEPHONE ENCOUNTER
Per [facility] pharmacy this medication is not cover by insurance. Initiate prior authorization or change medication please advise.

## 2023-06-22 NOTE — TELEPHONE ENCOUNTER
Please inform pt that I sent Rx for famciclovir 3 tablets twice daily for one day.  Thanks - jacyh

## 2023-06-27 DIAGNOSIS — B00.9 HERPES SIMPLEX TYPE 1 INFECTION: ICD-10-CM

## 2023-06-27 RX ORDER — FAMCICLOVIR 250 MG/1
TABLET ORAL
Qty: 12 TABLET | Refills: 3 | Status: SHIPPED | OUTPATIENT
Start: 2023-06-27 | End: 2024-06-07

## 2023-06-29 ENCOUNTER — TELEPHONE (OUTPATIENT)
Dept: FAMILY MEDICINE | Facility: CLINIC | Age: 53
End: 2023-06-29
Payer: COMMERCIAL

## 2023-06-29 NOTE — TELEPHONE ENCOUNTER
Central Prior Authorization Team   Phone: [phone]      PA Initiation    Medication: FAMCICLOVIR 250 MG PO TABS  Insurance Company: Comment:  [employer]  Pharmacy Filling the Rx: [id number] - [place], [place] - [address] AT [address] & [address]  Filling Pharmacy Phone: [phone]  Filling Pharmacy Fax:    Start Date: 6/29/2023

## 2023-06-29 NOTE — TELEPHONE ENCOUNTER
Prior Authorization Request  Who's requesting:  PHARMACY   Pharmacy Name and Location: [id number] - [place], [place] - [address] AT [address] [facility]  Medication Name: famciclovir (FAMVIR) 250 MG TABS tablet  Insurance Plan: [facility] OUT OF STATE   Insurance Member ID Number:  [id number]   CoverMyMeds Key: [id number]  Informed patient that prior authorizations can take up to 10 business days for response:   Yes  Okay to leave a detailed message: No

## 2023-06-30 NOTE — TELEPHONE ENCOUNTER
Prior Authorization Follow-Up  Per Stefanie at [employer] - they do not have any pending requests but she states she sees a paid claim at the pharmacy on 6/23. I called pharmacy to verify if they need a PA - they state that they do not have a paid claim. After checking the patients med list it appears it was originally sent to [facility] on 6/22. I called patient to notify. Patient states she is unsure how all this confusion came about, she only asked for a refill on her acyclovir and then the pharmacy had sent for a refill for her   Acyclovir but then it had been changed to a different medication. Please contact patient if there's any questions.

## 2023-06-30 NOTE — TELEPHONE ENCOUNTER
[facility] staff received a PA request for Famciclovir. In message, patient asking for Acyclovir. Must route to MD to clarify as pharmacy may have sent PA request in error.     MD - is patient using Acyclovir or Famciclovir? Please refill appropriate agent if indicated.

## 2023-07-01 NOTE — TELEPHONE ENCOUNTER
I sent refill for acyclovir on 6/19/23.  Pharmacy told us 6/22/23 that is was not covered.  I sent Rx for famciclovir on 6/22/23.  If acyclovir is covered, fill that Rx.  If not, do JANKI salgado

## 2023-07-03 NOTE — TELEPHONE ENCOUNTER
Central Prior Authorization Team - Phone: [phone]     Called [id number] [place]  Confirmed with pharmacist patient has picked up Rx for Acyclovir for $17 on 06/26/2023.    No further PA activity needed.    You may close encounter when finished. Thank you.

## 2023-07-17 ENCOUNTER — MYC MEDICAL ADVICE (OUTPATIENT)
Dept: FAMILY MEDICINE | Facility: CLINIC | Age: 53
End: 2023-07-17
Payer: COMMERCIAL

## 2023-07-17 DIAGNOSIS — Z76.0 ENCOUNTER FOR MEDICATION REFILL: ICD-10-CM

## 2023-07-18 RX ORDER — LEVOTHYROXINE SODIUM 150 UG/1
150 TABLET ORAL DAILY
Qty: 90 TABLET | Refills: 3 | Status: SHIPPED | OUTPATIENT
Start: 2023-07-18 | End: 2024-07-08

## 2023-08-16 ENCOUNTER — ANCILLARY PROCEDURE (OUTPATIENT)
Dept: MAMMOGRAPHY | Facility: CLINIC | Age: 53
End: 2023-08-16
Attending: PHYSICIAN ASSISTANT
Payer: COMMERCIAL

## 2023-08-16 DIAGNOSIS — Z12.31 VISIT FOR SCREENING MAMMOGRAM: ICD-10-CM

## 2023-08-16 PROCEDURE — 77067 SCR MAMMO BI INCL CAD: CPT | Mod: TC | Performed by: RADIOLOGY

## 2023-11-08 ENCOUNTER — PATIENT OUTREACH (OUTPATIENT)
Dept: GASTROENTEROLOGY | Facility: CLINIC | Age: 53
End: 2023-11-08
Payer: COMMERCIAL

## 2023-11-30 DIAGNOSIS — E55.9 VITAMIN D DEFICIENCY: ICD-10-CM

## 2023-11-30 RX ORDER — VITAMIN B COMPLEX
1 TABLET ORAL DAILY
Qty: 90 TABLET | Refills: 0 | Status: SHIPPED | OUTPATIENT
Start: 2023-11-30 | End: 2024-02-22

## 2024-01-15 ENCOUNTER — TELEPHONE (OUTPATIENT)
Dept: GASTROENTEROLOGY | Facility: CLINIC | Age: 54
End: 2024-01-15
Payer: COMMERCIAL

## 2024-01-15 ENCOUNTER — TELEPHONE (OUTPATIENT)
Dept: GASTROENTEROLOGY | Facility: CLINIC | Age: 54
End: 2024-01-15

## 2024-01-15 DIAGNOSIS — Z12.11 COLON CANCER SCREENING: Primary | ICD-10-CM

## 2024-01-15 RX ORDER — BISACODYL 5 MG/1
TABLET, DELAYED RELEASE ORAL
Qty: 4 TABLET | Refills: 0 | Status: SHIPPED | OUTPATIENT
Start: 2024-01-15 | End: 2024-06-07

## 2024-01-15 NOTE — TELEPHONE ENCOUNTER
Pre visit planning completed.      Procedure details:    Patient scheduled for Colonoscopy  on 01.30.2024.     Arrival time: 0615. Procedure time 0700    Pre op exam needed? N/A    Facility location: [facility]; [address], [place] [address]    Sedation type: Conscious sedation     Indication for procedure: Colon cancer screening       Chart review:     Electronic implanted devices? No    Recent diagnosis of diverticulitis within the last 6 weeks? No    Diabetic? No    Diabetic medication HOLDING recommendations: (if applicable)  Oral diabetic medications: N/A  Diabetic injectables: N/A  Insulin: N/A      Medication review:    Anticoagulants? No    NSAIDS? Yes.  Ibuprofen (Advil, Motrin).  Holding interval of 1 day before procedure.    Other medication HOLDING recommendations:  N/A      Prep for procedure:     Bowel prep recommendation: Extended prep Golytely    Due to:  standard bowel prep.    Prep instructions sent via [facility] Bowel prep script sent to    [facility] #[id number] - [place], [place] - [address] AT [address] [facility] [address] [facility]      Brittney Buckley RN  Endoscopy Procedure Pre Assessment RN  [phone] option 4

## 2024-01-15 NOTE — TELEPHONE ENCOUNTER
"Endoscopy Scheduling Screen    Have you had a positive Covid test in the last 14 days?  No    Are you active on MyChart?   Yes    What insurance is in the chart?  Other:  [employer]     Ordering/Referring Provider:     ZIYAD STAHL       (If ordering provider performs procedure, schedule with ordering provider unless otherwise instructed. )    BMI: Estimated body mass index is 32.59 kg/m  as calculated from the following:    Height as of 6/2/23: 1.6 m (5' 3\").    Weight as of 6/2/23: 83.5 kg (184 lb).     Sedation Ordered  moderate sedation.   If patient BMI > 50 do not schedule in [facility].    If patient BMI > 45 do not schedule at [facility].    Are you taking methadone or Suboxone?  No    Are you taking any prescription medications for pain 3 or more times per week?   NO - No RN review required.    Do you have a history of malignant hyperthermia or adverse reaction to anesthesia?  No    (Females) Are you currently pregnant?   No     Have you been diagnosed or told you have pulmonary hypertension?   No    Do you have an LVAD?  No    Have you been told you have moderate to severe sleep apnea?  No    Have you been told you have COPD, asthma, or any other lung disease?  No    Do you have any heart conditions?  No     Have you ever had an organ transplant?   No    Have you ever had or are you awaiting a heart or lung transplant?   No    Have you had a stroke or transient ischemic attack (TIA aka \"mini stroke\" in the last 6 months?   No    Have you been diagnosed with or been told you have cirrhosis of the liver?   No    Are you currently on dialysis?   No    Do you need assistance transferring?   No    BMI: Estimated body mass index is 32.59 kg/m  as calculated from the following:    Height as of 6/2/23: 1.6 m (5' 3\").    Weight as of 6/2/23: 83.5 kg (184 lb).     Is patients BMI > 40 and scheduling location [facility]?  No    Do you take an injectable medication for weight loss or diabetes (excluding insulin)?  No    Do you take " the medication Naltrexone?  No    Do you take blood thinners?  No       Prep   Are you currently on dialysis or do you have chronic kidney disease?  No    Do you have a diagnosis of diabetes?  No    Do you have a diagnosis of cystic fibrosis (CF)?  No    On a regular basis do you go 3 -5 days between bowel movements?  Yes (Extended Prep)    BMI > 40?  No    Preferred Pharmacy:      [facility]/pharmacy #[id number] - [address] AT [address] [facility]  [address] [address]  Phone: [phone] Fax: [phone]      Final Scheduling Details   Colonoscopy prep sent?  Golytely Extended Prep    Procedure scheduled  Colonoscopy    Surgeon:  OLGA      Date of procedure:  01/30      Pre-OP / PAC:   No - Not required for this site.    Location  [facility] - [facility] - Per order.    Sedation   Moderate Sedation  PER ORDER       Patient Reminders:   You will receive a call from a Nurse to review instructions and health history.  This assessment must be completed prior to your procedure.  Failure to complete the Nurse assessment may result in the procedure being cancelled.      On the day of your procedure, please designate an adult(s) who can drive you home stay with you for the next 24 hours. The medicines used in the exam will make you sleepy. You will not be able to drive.      You cannot take public transportation, ride share services, or non-medical taxi service without a responsible caregiver.  Medical transport services are allowed with the requirement that a responsible caregiver will receive you at your destination.  We require that drivers and caregivers are confirmed prior to your procedure.

## 2024-01-15 NOTE — TELEPHONE ENCOUNTER
Pre assessment completed for upcoming procedure.   (Please see previous telephone encounter notes for complete details)    Patient  returned call.       Procedure details:    Arrival time and facility location reviewed.    Pre op exam needed? N/A    Designated  policy reviewed. Instructed to have someone stay 6 hours post procedure.     COVID policy reviewed.      Medication review:    Medications reviewed. Please see supporting documentation below. Holding recommendations discussed (if applicable).   N/A  NSAID medication(s): Ibuprofen (Advil, Motrin): HOLD 1 day before procedure.      Prep for procedure:     Procedure prep instructions reviewed.        Additional information needed?  N/A      Patient  verbalized understanding and had no questions or concerns at this time.      Brittney Buckley RN  Endoscopy Procedure Pre Assessment RN  [phone] option 4

## 2024-01-25 RX ORDER — NALOXONE HYDROCHLORIDE 0.4 MG/ML
0.4 INJECTION, SOLUTION INTRAMUSCULAR; INTRAVENOUS; SUBCUTANEOUS
Status: CANCELLED | OUTPATIENT
Start: 2024-01-25

## 2024-01-25 RX ORDER — NALOXONE HYDROCHLORIDE 0.4 MG/ML
0.2 INJECTION, SOLUTION INTRAMUSCULAR; INTRAVENOUS; SUBCUTANEOUS
Status: CANCELLED | OUTPATIENT
Start: 2024-01-25

## 2024-01-25 RX ORDER — PROCHLORPERAZINE MALEATE 10 MG
10 TABLET ORAL EVERY 6 HOURS PRN
Status: CANCELLED | OUTPATIENT
Start: 2024-01-25

## 2024-01-25 RX ORDER — ONDANSETRON 4 MG/1
4 TABLET, ORALLY DISINTEGRATING ORAL EVERY 6 HOURS PRN
Status: CANCELLED | OUTPATIENT
Start: 2024-01-25

## 2024-01-25 RX ORDER — ONDANSETRON 2 MG/ML
4 INJECTION INTRAMUSCULAR; INTRAVENOUS EVERY 6 HOURS PRN
Status: CANCELLED | OUTPATIENT
Start: 2024-01-25

## 2024-01-25 RX ORDER — FLUMAZENIL 0.1 MG/ML
0.2 INJECTION, SOLUTION INTRAVENOUS
Status: CANCELLED | OUTPATIENT
Start: 2024-01-25 | End: 2024-01-25

## 2024-01-30 ENCOUNTER — HOSPITAL ENCOUNTER (OUTPATIENT)
Facility: AMBULATORY SURGERY CENTER | Age: 54
Discharge: HOME | End: 2024-01-30
Attending: INTERNAL MEDICINE | Admitting: INTERNAL MEDICINE
Payer: COMMERCIAL

## 2024-01-30 VITALS
TEMPERATURE: 97.8 F | SYSTOLIC BLOOD PRESSURE: 125 MMHG | HEART RATE: 74 BPM | OXYGEN SATURATION: 98 % | RESPIRATION RATE: 16 BRPM | DIASTOLIC BLOOD PRESSURE: 86 MMHG

## 2024-01-30 LAB — COLONOSCOPY: NORMAL

## 2024-01-30 PROCEDURE — G8918 PT W/O PREOP ORDER IV AB PRO: HCPCS

## 2024-01-30 PROCEDURE — G8907 PT DOC NO EVENTS ON DISCHARG: HCPCS

## 2024-01-30 PROCEDURE — 45380 COLONOSCOPY AND BIOPSY: CPT | Mod: XS

## 2024-01-30 PROCEDURE — 88305 TISSUE EXAM BY PATHOLOGIST: CPT | Performed by: PATHOLOGY

## 2024-01-30 PROCEDURE — 45385 COLONOSCOPY W/LESION REMOVAL: CPT

## 2024-01-30 RX ORDER — FENTANYL CITRATE 50 UG/ML
INJECTION, SOLUTION INTRAMUSCULAR; INTRAVENOUS PRN
Status: DISCONTINUED | OUTPATIENT
Start: 2024-01-30 | End: 2024-01-30 | Stop reason: HOSPADM

## 2024-01-30 RX ORDER — LIDOCAINE 40 MG/G
CREAM TOPICAL
Status: DISCONTINUED | OUTPATIENT
Start: 2024-01-30 | End: 2024-01-31 | Stop reason: HOSPADM

## 2024-01-30 RX ORDER — ONDANSETRON 2 MG/ML
4 INJECTION INTRAMUSCULAR; INTRAVENOUS
Status: DISCONTINUED | OUTPATIENT
Start: 2024-01-30 | End: 2024-01-31 | Stop reason: HOSPADM

## 2024-01-30 NOTE — H&P
[facility] Anesthesia Pre-op History and Physical    Caryn Elizalde MRN# [id number]   Age: 53 year old YOB: 1970            Date of Exam 1/30/2024       Primary care provider: Larry Schneider         Chief Complaint and/or Reason for Procedure:     History of colon polyps (adenomatous per previous reports)         Active problem list:     Patient Active Problem List    Diagnosis Date Noted    Hypothyroidism due to Hashimoto's thyroiditis 03/04/2021     Priority: Medium    Left-sided Bell's palsy 03/04/2021     Priority: Medium    Intractable chronic migraine without aura 06/26/2020     Priority: Medium    Obesity 06/26/2020     Priority: Medium    Menstrual migraine without status migrainosus, not intractable 07/29/2019     Priority: Medium    Herpes simplex type 1 infection 07/29/2019     Priority: Medium    Gait abnormality 12/24/2018     Priority: Medium    MDRO (multiple drug resistant organisms) resistance 11/06/2018     Priority: Medium     10/29/18  Positive culture of left ear for Carbapenem resistant Pseudomonas aeruginosa  11/19/18  Carbapenemase negative  10/29/18  Positive culture of left ear for Carbapenem resistant Pseudomonas aeruginosa  11/19/18  Carbapenemase negative      Adenomatous polyp of colon 08/07/2018     Priority: Medium     Tubular adenomas      Tobacco user 08/07/2018     Priority: Medium    Vitamin D deficiency 02/26/2018     Priority: Medium    Fatty liver 05/27/2016     Priority: Medium     Overview:   Fatty liver, Dx by US      Gallbladder polyp 05/27/2016     Priority: Medium     Overview:   Gallbladder polyp, 2 mm size      Right kidney stone 05/27/2016     Priority: Medium    Elevated platelet count 05/24/2016     Priority: Medium    Screening for malignant neoplasm of cervix 05/26/2015     Priority: Medium     Overview:   209 NILM  2010 NILM  2015 NILM, no ECC, HPV negative  Plan: Co-test 5/2020  Epic       Herpes simplex virus (HSV) infection  05/17/2013     Priority: Medium     Overview:   Epic       Limb-girdle muscular dystrophy, type 2B (H) 10/17/2012     Priority: Medium    Acne rosacea 04/09/2009     Priority: Medium    Hypothyroidism 04/09/2009     Priority: Medium    Limb-girdle muscular dystrophy (H) 10/30/2008     Priority: Medium            Medications (include herbals and vitamins):   Any Plavix use in the last 7 days? No     Current Outpatient Medications   Medication Sig    levothyroxine (SYNTHROID/LEVOTHROID) 150 MCG tablet Take 1 tablet (150 mcg) by mouth daily    Vitamin D3 (CHOLECALCIFEROL) 25 mcg (1000 units) tablet Take 1 tablet (25 mcg) by mouth daily Follow up for further refills.    acyclovir (ZOVIRAX) 400 MG tablet TAKE 1 TABLET BY MOUTH THREE TIMES DAILY FOR 5 DAYS. AS NEEDED FOR OUT BREAK    bisacodyl (DULCOLAX) 5 MG EC tablet 2 days prior to procedure, take 2 tablets at 4 pm. 1 day prior to procedure, take 2 tablets at 4 pm. For additional instructions refer to your colonoscopy prep instructions.    famciclovir (FAMVIR) 250 MG TABS tablet Take 3 tablets twice daily for one day.    ibuprofen (ADVIL/MOTRIN) 200 MG tablet Take 1-2 tablets by mouth every 4 hours as needed    polyethylene glycol (GOLYTELY) 236 g suspension 2 days prior at 5pm, mix and drink half of a jug of Golytely. Drink an 8 oz. glass of Golytely every 15 minutes until half of the jug is gone. Place remainder of Golytely in the refrigerator. 1 day prior at 5 pm, drink the 2nd half of a jug of Golytely bowel prep. 6 hours before your check-in time, drink an 8 oz. glass of Golytely every 15 minutes until half of the 2nd jug of Golytely is gone. Discard remainder of second jug.    rizatriptan (MAXALT) 10 MG tablet TAKE 1 TABLET BY MOUTH AS NEEDED FOR MIGRAINE, MAY REPEAT AFTER 2 HOURS AS NEEDED. MAX 2 DOSES IN 24 HOURS     Current Facility-Administered Medications   Medication    lidocaine (LMX4) kit    lidocaine 1 % 0.1-1 mL    ondansetron (ZOFRAN) injection 4 mg     sodium chloride (PF) 0.9% PF flush 3 mL    sodium chloride (PF) 0.9% PF flush 3 mL             Allergies:      Allergies   Allergen Reactions    Amoxicillin Rash     Other reaction(s): hives     Allergy to Latex? No  Allergy to tape?   No  Intolerances:             Physical Exam:   All vitals have been reviewed  Patient Vitals for the past 8 hrs:   BP Temp Temp src Resp SpO2   01/30/24 0641 127/65 98  F (36.7  C) Temporal 16 95 %     No intake/output data recorded.  Lungs:   No increased work of breathing, good air exchange, clear to auscultation bilaterally, no crackles or wheezing     Cardiovascular:   normal S1 and S2             Lab / Radiology Results:            Anesthetic risk and/or ASA classification:     2  Jo Carroll DO    od

## 2024-02-01 LAB
PATH REPORT.COMMENTS IMP SPEC: NORMAL
PATH REPORT.COMMENTS IMP SPEC: NORMAL
PATH REPORT.FINAL DX SPEC: NORMAL
PATH REPORT.GROSS SPEC: NORMAL
PATH REPORT.MICROSCOPIC SPEC OTHER STN: NORMAL
PATH REPORT.RELEVANT HX SPEC: NORMAL
PHOTO IMAGE: NORMAL

## 2024-02-14 ENCOUNTER — OFFICE VISIT (OUTPATIENT)
Dept: FAMILY MEDICINE | Facility: CLINIC | Age: 54
End: 2024-02-14
Payer: COMMERCIAL

## 2024-02-14 ENCOUNTER — TELEPHONE (OUTPATIENT)
Dept: FAMILY MEDICINE | Facility: CLINIC | Age: 54
End: 2024-02-14

## 2024-02-14 VITALS
SYSTOLIC BLOOD PRESSURE: 115 MMHG | DIASTOLIC BLOOD PRESSURE: 74 MMHG | TEMPERATURE: 97.8 F | RESPIRATION RATE: 16 BRPM | BODY MASS INDEX: 34.96 KG/M2 | WEIGHT: 190 LBS | HEIGHT: 62 IN | OXYGEN SATURATION: 96 % | HEART RATE: 78 BPM

## 2024-02-14 DIAGNOSIS — E66.811 CLASS 1 OBESITY WITH BODY MASS INDEX (BMI) OF 34.0 TO 34.9 IN ADULT, UNSPECIFIED OBESITY TYPE, UNSPECIFIED WHETHER SERIOUS COMORBIDITY PRESENT: Primary | ICD-10-CM

## 2024-02-14 DIAGNOSIS — E03.9 HYPOTHYROIDISM, UNSPECIFIED TYPE: ICD-10-CM

## 2024-02-14 DIAGNOSIS — E55.9 VITAMIN D DEFICIENCY: ICD-10-CM

## 2024-02-14 DIAGNOSIS — G71.039 LIMB-GIRDLE MUSCULAR DYSTROPHY (H): ICD-10-CM

## 2024-02-14 LAB
ALBUMIN SERPL BCG-MCNC: 4.3 G/DL (ref 3.5–5.2)
ALP SERPL-CCNC: 67 U/L (ref 40–150)
ALT SERPL W P-5'-P-CCNC: 98 U/L (ref 0–50)
ANION GAP SERPL CALCULATED.3IONS-SCNC: 8 MMOL/L (ref 7–15)
AST SERPL W P-5'-P-CCNC: 79 U/L (ref 0–45)
BASOPHILS # BLD AUTO: 0 10E3/UL (ref 0–0.2)
BASOPHILS NFR BLD AUTO: 1 %
BILIRUB SERPL-MCNC: 0.3 MG/DL
BUN SERPL-MCNC: 6.9 MG/DL (ref 6–20)
CALCIUM SERPL-MCNC: 9.2 MG/DL (ref 8.6–10)
CHLORIDE SERPL-SCNC: 103 MMOL/L (ref 98–107)
CREAT SERPL-MCNC: 0.37 MG/DL (ref 0.51–0.95)
DEPRECATED HCO3 PLAS-SCNC: 27 MMOL/L (ref 22–29)
EGFRCR SERPLBLD CKD-EPI 2021: >90 ML/MIN/1.73M2
EOSINOPHIL # BLD AUTO: 0.2 10E3/UL (ref 0–0.7)
EOSINOPHIL NFR BLD AUTO: 3 %
ERYTHROCYTE [DISTWIDTH] IN BLOOD BY AUTOMATED COUNT: 12.2 % (ref 10–15)
GLUCOSE SERPL-MCNC: 93 MG/DL (ref 70–99)
HCT VFR BLD AUTO: 39.8 % (ref 35–47)
HGB BLD-MCNC: 13.3 G/DL (ref 11.7–15.7)
IMM GRANULOCYTES # BLD: 0 10E3/UL
IMM GRANULOCYTES NFR BLD: 0 %
LYMPHOCYTES # BLD AUTO: 2.4 10E3/UL (ref 0.8–5.3)
LYMPHOCYTES NFR BLD AUTO: 41 %
MCH RBC QN AUTO: 31.9 PG (ref 26.5–33)
MCHC RBC AUTO-ENTMCNC: 33.4 G/DL (ref 31.5–36.5)
MCV RBC AUTO: 95 FL (ref 78–100)
MONOCYTES # BLD AUTO: 0.3 10E3/UL (ref 0–1.3)
MONOCYTES NFR BLD AUTO: 5 %
NEUTROPHILS # BLD AUTO: 2.9 10E3/UL (ref 1.6–8.3)
NEUTROPHILS NFR BLD AUTO: 50 %
PLATELET # BLD AUTO: 391 10E3/UL (ref 150–450)
POTASSIUM SERPL-SCNC: 4.6 MMOL/L (ref 3.4–5.3)
PROT SERPL-MCNC: 6.6 G/DL (ref 6.4–8.3)
RBC # BLD AUTO: 4.17 10E6/UL (ref 3.8–5.2)
SODIUM SERPL-SCNC: 138 MMOL/L (ref 135–145)
T4 FREE SERPL-MCNC: 1.42 NG/DL (ref 0.9–1.7)
TSH SERPL DL<=0.005 MIU/L-ACNC: 4.21 UIU/ML (ref 0.3–4.2)
VIT D+METAB SERPL-MCNC: 19 NG/ML (ref 20–50)
WBC # BLD AUTO: 5.8 10E3/UL (ref 4–11)

## 2024-02-14 PROCEDURE — 82306 VITAMIN D 25 HYDROXY: CPT | Performed by: PHYSICIAN ASSISTANT

## 2024-02-14 PROCEDURE — 36415 COLL VENOUS BLD VENIPUNCTURE: CPT | Performed by: PHYSICIAN ASSISTANT

## 2024-02-14 PROCEDURE — 80053 COMPREHEN METABOLIC PANEL: CPT | Performed by: PHYSICIAN ASSISTANT

## 2024-02-14 PROCEDURE — 84443 ASSAY THYROID STIM HORMONE: CPT | Performed by: PHYSICIAN ASSISTANT

## 2024-02-14 PROCEDURE — 85025 COMPLETE CBC W/AUTO DIFF WBC: CPT | Performed by: PHYSICIAN ASSISTANT

## 2024-02-14 PROCEDURE — 99213 OFFICE O/P EST LOW 20 MIN: CPT | Performed by: PHYSICIAN ASSISTANT

## 2024-02-14 PROCEDURE — 84439 ASSAY OF FREE THYROXINE: CPT | Performed by: PHYSICIAN ASSISTANT

## 2024-02-14 ASSESSMENT — PAIN SCALES - GENERAL: PAINLEVEL: NO PAIN (0)

## 2024-02-14 NOTE — TELEPHONE ENCOUNTER
Prior Authorization Retail Medication Request    Medication/Dose: semaglutide (OZEMPIC) 2 MG/3ML pen  Diagnosis and ICD code (if different than what is on RX):  Class 1 obesity with body mass index (BMI) of 34.0 to 34.9 in adult, unspecified obesity type, unspecified whether serious comorbidity present [E66.9, Z68.34]    New/renewal/insurance change PA/secondary ins. PA:  Previously Tried and Failed:    Rationale:      Insurance   Primary: [employer]  Insurance ID:  [id number]  BIN: [id number]  PCN: [id number]  Group: [id number]  Secondary (if applicable):N/A  Insurance ID:  N/A    Pharmacy Information (if different than what is on RX)  Name:  [facility]  Phone:  [phone]  Fax:[phone]

## 2024-02-22 DIAGNOSIS — E55.9 VITAMIN D DEFICIENCY: ICD-10-CM

## 2024-02-22 RX ORDER — VITAMIN B COMPLEX
1 TABLET ORAL DAILY
Qty: 90 TABLET | Refills: 0 | Status: SHIPPED | OUTPATIENT
Start: 2024-02-22

## 2024-02-27 NOTE — TELEPHONE ENCOUNTER
Retail Pharmacy Prior Authorization Team   Phone: [phone]    PA Initiation    Medication: OZEMPIC (0.25 OR 0.5 MG/DOSE) 2 MG/3ML SC SOPN  Insurance Company: Other (see comments)  Pharmacy Filling the Rx: [id number] - [place], [place] - [address]  Filling Pharmacy Phone: [phone]  Filling Pharmacy Fax:    Start Date: 2/27/2024

## 2024-02-28 NOTE — TELEPHONE ENCOUNTER
PRIOR AUTHORIZATION DENIED    Medication: OZEMPIC (0.25 OR 0.5 MG/DOSE) 2 MG/3ML SC SOPN  Insurance Company: Other (see comments)  Denial Date: 2/27/2024  Denial Reason(s):             Appeal Information: Not available/provided  Patient Notified: No

## 2024-03-01 NOTE — TELEPHONE ENCOUNTER
Left detailed message on patient's voicemail, with Magdiel's message.Sona Fisher [facility]

## 2024-03-01 NOTE — TELEPHONE ENCOUNTER
Please contact patient.     It appears insurance does not cover wegovy.     At this time would be out of pocket cost.     Would need to discuss with pharmacy on cost as well as availability.     Larry Schneider PA-C

## 2024-04-09 ENCOUNTER — OFFICE VISIT (OUTPATIENT)
Dept: NEUROLOGY | Facility: CLINIC | Age: 54
End: 2024-04-09
Payer: COMMERCIAL

## 2024-04-09 VITALS
HEART RATE: 73 BPM | DIASTOLIC BLOOD PRESSURE: 75 MMHG | BODY MASS INDEX: 34.97 KG/M2 | WEIGHT: 191.2 LBS | SYSTOLIC BLOOD PRESSURE: 119 MMHG

## 2024-04-09 DIAGNOSIS — M54.2 CERVICALGIA: ICD-10-CM

## 2024-04-09 DIAGNOSIS — Z79.899 ENCOUNTER FOR LONG-TERM (CURRENT) USE OF MEDICATIONS: ICD-10-CM

## 2024-04-09 DIAGNOSIS — G43.719 INTRACTABLE CHRONIC MIGRAINE WITHOUT AURA AND WITHOUT STATUS MIGRAINOSUS: Primary | ICD-10-CM

## 2024-04-09 DIAGNOSIS — R42 VERTIGO: ICD-10-CM

## 2024-04-09 PROCEDURE — 99214 OFFICE O/P EST MOD 30 MIN: CPT | Performed by: PSYCHIATRY & NEUROLOGY

## 2024-04-09 RX ORDER — RIZATRIPTAN BENZOATE 10 MG/1
TABLET ORAL
Qty: 18 TABLET | Refills: 11 | Status: SHIPPED | OUTPATIENT
Start: 2024-04-09

## 2024-04-09 NOTE — PROGRESS NOTES
ESTABLISHED PATIENT NEUROLOGY NOTE    DATE OF VISIT: 4/9/2024  MRN: [id number]  PATIENT NAME: Caryn Elizalde  YOB: 1970    Chief Complaint   Patient presents with    Headache     Annual follow-up   Migraines are getting less frequent and much better  Been having horrible vertigo     SUBJECTIVE:                                                      HISTORY OF PRESENT ILLNESS:  Caryn is here for follow up regarding migraine headaches.  She is on rizatriptan as needed for her migraines.    She says that her menstrual cycle has been changing, and this has led to a general improvement in the migraines.  She is quite pleased about this.  The rizatriptan is still helpful when she is symptomatic.    For the past 6-8 weeks she has been having problems with brief episodes of dizziness, a sense that she is falling.  This can occur sitting, standing or even when she is in bed.  She does have history of a few falls over the winter.  Since then she has been having a lot of neck and right-sided shoulder pain.  She also endorses some tingling down into the arm and leg.  She is not sure if some of this is related to her muscular dystrophy, hard to tell.    No visual changes or changes in hearing with the dizzy spells.    CURRENT MEDICATIONS:   Current Outpatient Medications   Medication Sig Dispense Refill    acyclovir (ZOVIRAX) 400 MG tablet TAKE 1 TABLET BY MOUTH THREE TIMES DAILY FOR 5 DAYS. AS NEEDED FOR OUT BREAK 30 tablet 11    ibuprofen (ADVIL/MOTRIN) 200 MG tablet Take 1-2 tablets by mouth every 4 hours as needed      levothyroxine (SYNTHROID/LEVOTHROID) 150 MCG tablet Take 1 tablet (150 mcg) by mouth daily 90 tablet 3    rizatriptan (MAXALT) 10 MG tablet TAKE 1 TABLET BY MOUTH AS NEEDED FOR MIGRAINE, MAY REPEAT AFTER 2 HOURS AS NEEDED. MAX 2 DOSES IN 24 HOURS 18 tablet 11    Vitamin D3 (CHOLECALCIFEROL) 25 mcg (1000 units) tablet Take 1 tablet (25 mcg) by mouth daily Follow up for further refills. 90  tablet 0    bisacodyl (DULCOLAX) 5 MG EC tablet 2 days prior to procedure, take 2 tablets at 4 pm. 1 day prior to procedure, take 2 tablets at 4 pm. For additional instructions refer to your colonoscopy prep instructions. 4 tablet 0    famciclovir (FAMVIR) 250 MG TABS tablet Take 3 tablets twice daily for one day. (Patient not taking: Reported on 4/9/2024) 12 tablet 3    polyethylene glycol (GOLYTELY) 236 g suspension 2 days prior at 5pm, mix and drink half of a jug of Golytely. Drink an 8 oz. glass of Golytely every 15 minutes until half of the jug is gone. Place remainder of Golytely in the refrigerator. 1 day prior at 5 pm, drink the 2nd half of a jug of Golytely bowel prep. 6 hours before your check-in time, drink an 8 oz. glass of Golytely every 15 minutes until half of the 2nd jug of Golytely is gone. Discard remainder of second jug. 8000 mL 0     No current facility-administered medications for this visit.       RECENT DIAGNOSTIC STUDIES:   Labs: No results found for any visits on 04/09/24.    REVIEW OF SYSTEMS:                                                      10-point review of systems is negative except as mentioned above in HPI.    EXAM:                                                      Physical Exam:   Vitals: /75   Pulse 73   Wt 86.7 kg (191 lb 3.2 oz)   BMI 34.97 kg/m    BMI= Body mass index is 34.97 kg/m .  GENERAL: NAD.  HEENT: NC/AT.  CV: RRR. S1, S2.   NECK: No bruits.  PULM: Non-labored breathing.   Focused Neurologic:  MENTAL STATUS: Alert, attentive. Speech is fluent. Normal comprehension.Normal concentration. Adequate fund of knowledge.   CRANIAL NERVES: Discs technically difficult to visualize.  Visual fields intact to confrontation. Pupils equally, round and reactive to light.  Left facial weakness, slight ptosis on that side. EOM full. Hearing intact to conversation. Trapezius strength intact. Palate moves symmetrically. Tongue midline.  MOTOR: 4+/5 in proximal and distal  muscle groups of upper extremities.  She has difficulty fully straightening out the legs bilaterally.  AFOs in place.  Tone and bulk normal.   SENSATION: Normal light touch throughout.  STATION AND GAIT: Casual gait is slightly waddling.  Right hand-dominant.    ASSESSMENT and PLAN:                                                      Assessment:    ICD-10-CM    1. Intractable chronic migraine without aura and without status migrainosus  G43.719 MR Brain w/o & w Contrast     MR Cervical Spine w/o & w Contrast     rizatriptan (MAXALT) 10 MG tablet      2. Vertigo  R42 Physical Therapy  Referral     MR Brain w/o & w Contrast     MR Cervical Spine w/o & w Contrast      3. Encounter for long-term (current) use of medications  Z79.899       4. Cervicalgia  M54.2 MR Cervical Spine w/o & w Contrast          Ms. Elizalde is a pleasant 54-year-old woman here for follow-up regarding migraine headaches.  Because many of her symptoms occur around the time of menstruation, it seems that things are decreasing now that she is perimenopausal.  She is having some new brief episodes of vertigo for which I recommend physical therapy.  I do also want to do updated imaging because of the recent falls and continued pain and paresthesias she has had since.  We will plan to follow-up again in 1 year, or sooner if she does not see resolution of the vertigo, or if there is any concerning abnormalities on imaging.  Allison expressed understanding and agreement with the plan.    Plan:  Continue the rizatriptan as needed for migraines.  I would like to do a brain and cervical spine MRI to look into the new vertigo issue, especially with the history of multiple falls.  We will notify you of the results.  I am also putting a referral for vestibular therapy.  As long as the headaches remain under good control and the vertigo resolves, we can plan to follow-up again in 1 year.    Total Time: 32 minutes were spent with the patient and in chart  review/documentation (as described above in Assessment and Plan)/coordinating the care on date of service.    Rizwana Hernández MD  Neurology    Dragon software used in the dictation of this note.

## 2024-04-09 NOTE — PATIENT INSTRUCTIONS
Plan:  Continue the rizatriptan as needed for migraines.  I would like to do a brain and cervical spine MRI to look into the new vertigo issue, especially with the history of multiple falls.  We will notify you of the results.  I am also putting a referral for vestibular therapy.  As long as the headaches remain under good control and the vertigo resolves, we can plan to follow-up again in 1 year.

## 2024-04-09 NOTE — LETTER
4/9/2024         RE: Caryn Elizalde  [address]  [place] [address]        Dear Colleague,    Thank you for referring your patient, Caryn Elizalde, to the [facility] [place]. Please see a copy of my visit note below.    ESTABLISHED PATIENT NEUROLOGY NOTE    DATE OF VISIT: 4/9/2024  MRN: [id number]  PATIENT NAME: Caryn Elizalde  YOB: 1970    Chief Complaint   Patient presents with     Headache     Annual follow-up   Migraines are getting less frequent and much better  Been having horrible vertigo     SUBJECTIVE:                                                      HISTORY OF PRESENT ILLNESS:  Caryn is here for follow up regarding migraine headaches.  She is on rizatriptan as needed for her migraines.    She says that her menstrual cycle has been changing, and this has led to a general improvement in the migraines.  She is quite pleased about this.  The rizatriptan is still helpful when she is symptomatic.    For the past 6-8 weeks she has been having problems with brief episodes of dizziness, a sense that she is falling.  This can occur sitting, standing or even when she is in bed.  She does have history of a few falls over the winter.  Since then she has been having a lot of neck and right-sided shoulder pain.  She also endorses some tingling down into the arm and leg.  She is not sure if some of this is related to her muscular dystrophy, hard to tell.    No visual changes or changes in hearing with the dizzy spells.    CURRENT MEDICATIONS:   Current Outpatient Medications   Medication Sig Dispense Refill     acyclovir (ZOVIRAX) 400 MG tablet TAKE 1 TABLET BY MOUTH THREE TIMES DAILY FOR 5 DAYS. AS NEEDED FOR OUT BREAK 30 tablet 11     ibuprofen (ADVIL/MOTRIN) 200 MG tablet Take 1-2 tablets by mouth every 4 hours as needed       levothyroxine (SYNTHROID/LEVOTHROID) 150 MCG tablet Take 1 tablet (150 mcg) by mouth daily 90 tablet 3     rizatriptan  (MAXALT) 10 MG tablet TAKE 1 TABLET BY MOUTH AS NEEDED FOR MIGRAINE, MAY REPEAT AFTER 2 HOURS AS NEEDED. MAX 2 DOSES IN 24 HOURS 18 tablet 11     Vitamin D3 (CHOLECALCIFEROL) 25 mcg (1000 units) tablet Take 1 tablet (25 mcg) by mouth daily Follow up for further refills. 90 tablet 0     bisacodyl (DULCOLAX) 5 MG EC tablet 2 days prior to procedure, take 2 tablets at 4 pm. 1 day prior to procedure, take 2 tablets at 4 pm. For additional instructions refer to your colonoscopy prep instructions. 4 tablet 0     famciclovir (FAMVIR) 250 MG TABS tablet Take 3 tablets twice daily for one day. (Patient not taking: Reported on 4/9/2024) 12 tablet 3     polyethylene glycol (GOLYTELY) 236 g suspension 2 days prior at 5pm, mix and drink half of a jug of Golytely. Drink an 8 oz. glass of Golytely every 15 minutes until half of the jug is gone. Place remainder of Golytely in the refrigerator. 1 day prior at 5 pm, drink the 2nd half of a jug of Golytely bowel prep. 6 hours before your check-in time, drink an 8 oz. glass of Golytely every 15 minutes until half of the 2nd jug of Golytely is gone. Discard remainder of second jug. 8000 mL 0     No current facility-administered medications for this visit.       RECENT DIAGNOSTIC STUDIES:   Labs: No results found for any visits on 04/09/24.    REVIEW OF SYSTEMS:                                                      10-point review of systems is negative except as mentioned above in HPI.    EXAM:                                                      Physical Exam:   Vitals: /75   Pulse 73   Wt 86.7 kg (191 lb 3.2 oz)   BMI 34.97 kg/m    BMI= Body mass index is 34.97 kg/m .  GENERAL: NAD.  HEENT: NC/AT.  CV: RRR. S1, S2.   NECK: No bruits.  PULM: Non-labored breathing.   Focused Neurologic:  MENTAL STATUS: Alert, attentive. Speech is fluent. Normal comprehension.Normal concentration. Adequate fund of knowledge.   CRANIAL NERVES: Discs technically difficult to visualize.  Visual  nieto intact to confrontation. Pupils equally, round and reactive to light.  Left facial weakness, slight ptosis on that side. EOM full. Hearing intact to conversation. Trapezius strength intact. Palate moves symmetrically. Tongue midline.  MOTOR: 4+/5 in proximal and distal muscle groups of upper extremities.  She has difficulty fully straightening out the legs bilaterally.  AFOs in place.  Tone and bulk normal.   SENSATION: Normal light touch throughout.  STATION AND GAIT: Casual gait is slightly waddling.  Right hand-dominant.    ASSESSMENT and PLAN:                                                      Assessment:    ICD-10-CM    1. Intractable chronic migraine without aura and without status migrainosus  G43.719 MR Brain w/o & w Contrast     MR Cervical Spine w/o & w Contrast     rizatriptan (MAXALT) 10 MG tablet      2. Vertigo  R42 Physical Therapy  Referral     MR Brain w/o & w Contrast     MR Cervical Spine w/o & w Contrast      3. Encounter for long-term (current) use of medications  Z79.899       4. Cervicalgia  M54.2 MR Cervical Spine w/o & w Contrast          Ms. Elizalde is a pleasant 54-year-old woman here for follow-up regarding migraine headaches.  Because many of her symptoms occur around the time of menstruation, it seems that things are decreasing now that she is perimenopausal.  She is having some new brief episodes of vertigo for which I recommend physical therapy.  I do also want to do updated imaging because of the recent falls and continued pain and paresthesias she has had since.  We will plan to follow-up again in 1 year, or sooner if she does not see resolution of the vertigo, or if there is any concerning abnormalities on imaging.  Allison expressed understanding and agreement with the plan.    Plan:  Continue the rizatriptan as needed for migraines.  I would like to do a brain and cervical spine MRI to look into the new vertigo issue, especially with the history of multiple falls.   We will notify you of the results.  I am also putting a referral for vestibular therapy.  As long as the headaches remain under good control and the vertigo resolves, we can plan to follow-up again in 1 year.    Total Time: 32 minutes were spent with the patient and in chart review/documentation (as described above in Assessment and Plan)/coordinating the care on date of service.    Rizwana Hernández MD  Neurology    [employer] software used in the dictation of this note.                    Again, thank you for allowing me to participate in the care of your patient.        Sincerely,        Rizwana Hernández MD

## 2024-04-09 NOTE — NURSING NOTE
"Caryn Elizalde is a 54 year old female who presents for:  Chief Complaint   Patient presents with    Headache     Annual follow-up   Migraines are getting less frequent and much better  Been having horrible vertigo        Initial Vitals:  /75   Pulse 73   Wt 86.7 kg (191 lb 3.2 oz)   BMI 34.97 kg/m   Estimated body mass index is 34.97 kg/m  as calculated from the following:    Height as of 2/14/24: 1.575 m (5' 2\").    Weight as of this encounter: 86.7 kg (191 lb 3.2 oz).. Body surface area is 1.95 meters squared. BP completed using cuff size: wrist cuff    Kevin Hou  "

## 2024-06-03 ENCOUNTER — THERAPY VISIT (OUTPATIENT)
Dept: PHYSICAL THERAPY | Facility: CLINIC | Age: 54
End: 2024-06-03
Attending: PSYCHIATRY & NEUROLOGY
Payer: COMMERCIAL

## 2024-06-03 DIAGNOSIS — R26.81 UNSTEADINESS ON FEET: Primary | ICD-10-CM

## 2024-06-03 DIAGNOSIS — R42 VERTIGO: ICD-10-CM

## 2024-06-03 PROCEDURE — 97110 THERAPEUTIC EXERCISES: CPT | Mod: GP | Performed by: PHYSICAL THERAPIST

## 2024-06-03 PROCEDURE — 97112 NEUROMUSCULAR REEDUCATION: CPT | Mod: GP | Performed by: PHYSICAL THERAPIST

## 2024-06-03 PROCEDURE — 97162 PT EVAL MOD COMPLEX 30 MIN: CPT | Mod: GP | Performed by: PHYSICAL THERAPIST

## 2024-06-03 NOTE — PROGRESS NOTES
PHYSICAL THERAPY EVALUATION  Type of Visit: Evaluation    See electronic medical record for Abuse and Falls Screening details.    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 which also increase in vertigo, L bells palsy   Date of onset: 04/09/24    Relevant medical history: Dizziness; Migraines or headaches; Neck injury; Overweight; Significant weakness; Thyroid problems   Dates & types of surgery:      Prior diagnostic imaging/testing results:     awaiting brain and CS MRI  Prior therapy history for the same diagnosis, illness or injury: No        Living Environment  Social support:   none  Type of home: [facility]   Stairs to enter the home: No       Ramp: Yes   Stairs inside the home: Yes 14 Is there a railing: Yes   Help at home: None  Equipment owned: Straight Cane; Walker with wheels; Grab bars; Lift chair     Employment: Yes Finance  Hobbies/Interests: walking when able, reading, puzzles    Patient goals for therapy: move independently with more confidence    Pain assessment: Location: neck> joints /Rating: 3/10     Objective      Cognitive Status Examination  Orientation: Oriented to person, place and time   Level of Consciousness: Alert  Follows Commands and Answers Questions: 100% of the time  Personal Safety and Judgement: Intact  Memory: Intact      STRENGTH:  Seated LE myotome testing demonstrating significant weakness bilat that has progressed from previous MD note  HF-4L 2R, KE 2+ bilat, KF 1 bilat, AFOs on ankles     BED MOBILITY: Independent, requires use of UE for push off    TRANSFERS: Independent, use of UE for push off; IR, knee ext and UE push off     GAIT:   Level of [place]: Independent  Assistive Device(s): Walker (four wheeled)  Gait Deviations: Base of support increased  Drop foot L  Drop foot R  Lateral weight shift for foot  clearance  Genu recravtum bilat      SPECIAL TESTS    Single Leg Stance Right (sec) 0   Single Leg Stance Left (sec) 0   Modified CTSIB Conditions (sec) Cond 1: 6 in apart >30 sec  Cond 2:  10 sec with modified stance   Cond 4:   Cond 5 :    Romberg  (sec) 0   Sharpened Romberg (sec) 0                      VESTIBULAR EVALUATION  ADDITIONAL HISTORY:  Description of symptoms: Attacks of dizziness; I feel like the room is spinning Off balance >the room, disorienting  Dizzy attacks:   Start: I don't recall, get figure out triggers    Last attack: Saturday, June 1   Frequency of occurrences: sometimes multiple times in a day   Length of attack: 30 seconds  Difficulty hearing:  no   Noise in ears? No    Alleviates symptoms:  sitting down, visual focus  Worsens symptoms: Migraines, tension, motion  Activities that bring on symptoms: Riding in a car; Driving a car; Turning while walking       Pertinent visual history: no  Pertinent history of current vestibular problem: Migraines  DHI: Total Score: 22    Cervicogenic Screen    Neck ROM Normal   Vertebral Artery Test    Alar Ligament Test    Transverse Ligament Test    Distraction    Neck Torsion Test (head still, body rotating)    Neck Torsion Test (head and body rotating)         Oculomotor Screen    Ocular ROM Normal   Smooth Pursuit Abnormal R eye tracking abnormalities   Saccades Normal   VOR Normal   VOR Cancellation Normal   Head Impulse Test Normal   Convergence Testing Normal          Dynamic Visual Acuity (DVA)    Static Acuity (LogMar) 10/20   Horizontal Head Movement at 1 Hz (LogMar)    Horizontal Head Movement at 2 Hz (LogMar) Within 1 line   No deficit identified       Assessment & Plan   CLINICAL IMPRESSIONS  Medical Diagnosis:      Treatment Diagnosis: Sensory Selection Deficit, Force Production Deficit   Impression/Assessment: Patient is a 54 year old female with imbalance complaints.  The following significant findings have been identified: Decreased  strength, Impaired balance, Decreased proprioception, Impaired gait, Impaired muscle performance, Decreased activity tolerance, and Disequilibrium . These impairments interfere with their ability to perform self care tasks, household chores, driving , household mobility, and community mobility as compared to previous level of function.     Clinical Decision Making (Complexity):  Clinical Presentation: Evolving/Changing  Clinical Presentation Rationale: based on medical and personal factors listed in PT evaluation  Clinical Decision Making (Complexity): Moderate complexity    PLAN OF CARE  Treatment Interventions:  Interventions: Gait Training, Neuromuscular Re-education, Therapeutic Activity, Therapeutic Exercise, Self-Care/Home Management    Long Term Goals     PT Goal 1  Goal Identifier: HEP  Goal Description: Patient will be indepdent with home based exercises to improve sensory selection and force production to improve steadiness and reduce fall risk  Target Date: 07/03/24  PT Goal 2  Goal Identifier: DHI  Goal Description: Will report reduction in disability related to dizziness >5pts to improve quality of life and safety  Goal Progress: 22 at IE  Target Date: 08/31/24      Frequency of Treatment: 1-2x/wk  Duration of Treatment: up to 90 days    Recommended Referrals to Other Professionals:   Education Assessment:   Learner/Method: Patient  Education Comments: Eval findings, age-matched scores, goals, POC    Risks and benefits of evaluation/treatment have been explained.   Patient/Family/caregiver agrees with Plan of Care.     Evaluation Time:     PT Eval, Moderate Complexity Minutes (12958): 30       Signing Clinician: KONSTANTIN MEJIA, PT

## 2024-06-06 NOTE — PROGRESS NOTES
"New Medical Weight Management Consult    PATIENT:  Caryn Elizalde  MRN:         [id number]  :         1970  KANWAL:         2024        I had the pleasure of seeing your patient, Caryn Elizalde. Full intake/assessment was done to determine barriers to weight loss success and develop a treatment plan. Caryn Elizalde is a 54 year old female interested in treatment of medical problems associated with excess weight. She has a height of 5' 3\", a weight of 192 lbs 9.6 oz, and the calculated Body mass index is 34.12 kg/m .    Caryn is a patient with mature onset class I obesity with significant element of familial/genetic influence and with current health consequences. 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  Caryn Elizalde eats a high carb diet, eats a high fat diet, uses food as a reward, uses food as mood management, has perception of intense hunger, has a disorganized meal pattern, and had accelerated appetite after quitting smoking this year.      Her problem is complicated by a hunger disorder, strong craving/reward pathways, a neurobiological disorder, gender and short stature, and perimenopausal status which can further increase appetite/decrease satiety and lower intrinsic desire for movement. 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      Review of the patient's history and habits today suggest that weight gain has been progressive in recent years..    Previous Interventions found to be helpful in the past for weight loss include limited success on her own. She was resistant to appetite suppressive therapy initially but was intrigued by use of phentermine given her lower overall energy in recent months and " "uncertain coverage for GLP1 RA therapies like Wegovy or Zepbound.    We discussed a toolbox approach to weight management today and she is open to combining mindful, reduced calorie dietary therapy with increased mindfulness techniques, activity/exercise improvements to optimize their current and future health.  Medication assistance for appetite control was discussed today and on review of the risks/benefits for this patients health history, we've decided to start with appetite suppressant therapy.    ASSESSMENT AND PLAN  Problem List Items Addressed This Visit          Digestive    Fatty liver    Obesity - Primary    Relevant Medications    phentermine (ADIPEX-P) 37.5 MG tablet    Other Relevant Orders    Vitamin B12    25- [place]-Vitamin D    Comprehensive metabolic panel    Hemoglobin A1c    TSH    T4 free       Endocrine    Hypothyroidism    Relevant Orders    TSH    T4 free            60 minutes spent by me on the date of the encounter doing chart review, history and exam, documentation and further activities per the note        She has the following co-morbidities:        6/7/2024     8:10 AM   --   I have the following health issues associated with obesity Fatty Liver   I have the following symptoms associated with obesity Lower Extremity Swelling    Fatigue            No data to display                    6/7/2024     8:10 AM   Referring Provider   Please name the provider who referred you to Medical Weight Management  If you do not know, please answer \"I Don't Know\" I can't recall his name           6/7/2024     8:10 AM   Weight History   How concerned are you about your weight? Very Concerned   I became overweight As an Adult   The following factors have contributed to my weight gain Mental Health Issues    After Quitting Smoking    Eating Wrong Types of Food    Eating Too Much    Lack of Exercise    Other   Please list the other factors LGMD and hormones   I have tried the following methods to lose weight " Watching Portions or Calories    Meal Replacements   My lowest weight since age 17 was 123   My highest weight since age 17 was 189.2   The most weight I have ever lost was (lbs) 25   I have the following family history of obesity/being overweight One or more of my siblings are overweight    Many of my relatives are overweight   How has your weight changed over the last year? Gained   How many pounds? 20   Quit smoking in January and gained weight afterwards.        6/7/2024     8:10 AM   Diet Recall Review with Patient   If you do eat breakfast, what types of food do you eat? eggs, toast, fruit   If you do eat lunch, what types of food do you typically eat? salad, sandwhich   If you do snack, what types of food do you typically eat? toast, baked goods, cheese, crackers   How many glasses of juice do you drink in a typical day? 0   How many of glasses of milk do you drink in a typical day? 0   How many 8oz glasses of sugar containing drinks such as Omari-Aid/sweet tea do you drink in a day? 0   How many cans/bottles of sugar pop/soda/tea/sports drinks do you drink in a day? 0   How many cans/bottles of diet pop/soda/tea or sports drink do you drink in a day? 3   How often do you have a drink of alcohol? Monthly or Less   If you do drink, how many drinks might you have in a day? 1 or 2           6/7/2024     8:10 AM   Eating Habits   Generally, my meals include foods like these bread, pasta, rice, potatoes, corn, crackers, sweet dessert, pop, or juice A Few Times a Week   Generally, my meals include foods like these fried meats, brats, burgers, french fries, pizza, cheese, chips, or ice cream Once a Week   Eat fast food (like McDonalds, Burger Darryn, Taco Bell) Once a Week   Eat at a buffet or sit-down restaurant Less Than Weekly   Eat most of my meals in front of the TV or computer Less Than Weekly   Often skip meals, eat at random times, have no regular eating times A Few Times a Week   Rarely sit down for a meal but  snack or graze throughout A Few Times a Week   Eat extra snacks between meals A Few Times a Week   Eat most of my food at the end of the day Less Than Weekly   Eat in the middle of the night or wake up at night to eat Never   Eat extra snacks to prevent or correct low blood sugar A Few Times a Week   Eat to prevent acid reflux or stomach pain Never   Worry about not having enough food to eat Never   I eat when I am depressed Less Than Weekly   I eat when I am stressed A Few Times a Week   I eat when I am bored A Few Times a Week   I eat when I am anxious Less Than Weekly   I eat when I am happy or as a reward Less Than Weekly   I feel hungry all the time even if I just have eaten Never   Feeling full is important to me Never   I finish all the food on my plate even if I am already full Never   I can't resist eating delicious food or walk past the good food/smell A Few Times a Week   I eat/snack without noticing that I am eating A Few Times a Week   I eat when I am preparing the meal A Few Times a Week   I eat more than usual when I see others eating Less Than Weekly   I have trouble not eating sweets, ice cream, cookies, or chips if they are around the house Everyday   I think about food all day Almost Everyday   What foods, if any, do you crave? Sweets/Candy/Chocolate           6/7/2024     8:10 AM   Amount of Food   I feel out of control when eating Monthly   I eat a large amount of food, like a loaf of bread, a box of cookies, a pint/quart of ice cream, all at once Monthly   I eat a large amount of food even when I am not hungry Never   I eat rapidly Never   I eat alone because I feel embarrassed and do not want others to see how much I have eaten Never   I eat until I am uncomfortably full Never   I feel bad, disgusted, or guilty after I overeat Never           6/7/2024     8:10 AM   Activity/Exercise History   How much of a typical 12 hour day do you spend sitting? Most of the Day   How much of a typical 12  hour day do you spend lying down? Less Than Half the Day   How much of a typical day do you spend walking/standing? Less Than Half the Day   How many hours (not including work) do you spend on the TV/Video Games/Computer/Tablet/Phone? 2-3 Hours   How many times a week are you active for the purpose of exercise? 2-3 Times a Week   What keeps you from being more active? Pain    Other   How many total minutes do you spend doing some activity for the purpose of exercising when you exercise? More Than 30 Minutes       PAST MEDICAL HISTORY:  Past Medical History:   Diagnosis Date    Herpes     Hypothyroidism     Migraines            6/7/2024     8:10 AM   Work/Social History Reviewed With Patient   My employment status is Full-Time   My job is Finance / Sales support   How much of your job is spent on the computer or phone? 100%   How many hours do you spend commuting to work daily? 1.25   What is your marital status? Single   Who do you live with? no one   Who does the food shopping? me   Works for bank. Long hours/sedentary.         6/7/2024     8:10 AM   Mental Health History Reviewed With Patient   Have you ever been physically or sexually abused? No   How often in the past 2 weeks have you felt little interest or pleasure in doing things? Not at all   Over the past 2 weeks how often have you felt down, depressed, or hopeless? Not at all           6/7/2024     8:10 AM   Sleep History Reviewed With Patient   How many hours do you sleep at night? 7       Past Surgical History:   Procedure Laterality Date    BIOPSY      COLONOSCOPY      COLONOSCOPY N/A 1/30/2024    Procedure: COLONOSCOPY, WITH POLYPECTOMY AND BIOPSY;  Surgeon: Jo Carroll DO;  Location: [facility] OR    COLONOSCOPY N/A 1/30/2024    Procedure: COLONOSCOPY, FLEXIBLE, WITH LESION REMOVAL USING SNARE;  Surgeon: Jo Carroll DO;  Location: [facility] OR    COLONOSCOPY WITH CO2 INSUFFLATION N/A 1/30/2024    Procedure: Colonoscopy with CO2 insufflation;  Surgeon:  Jo Carroll DO;  Location: [facility] OR    GYN SURGERY         Social History     Socioeconomic History    Marital status: Single     Spouse name: Not on file    Number of children: Not on file    Years of education: Not on file    Highest education level: Not on file   Occupational History    Not on file   Tobacco Use    Smoking status: Former     Current packs/day: 0.00     Average packs/day: 0.5 packs/day for 39.2 years (19.6 ttl pk-yrs)     Types: Cigarettes     Start date: 1983     Quit date: 2024     Years since quittin.4    Smokeless tobacco: Never   Vaping Use    Vaping status: Never Used   Substance and Sexual Activity    Alcohol use: Yes     Alcohol/week: 1.0 standard drink of alcohol     Types: 1 Standard drinks or equivalent per week     Comment: MONTHLY    Drug use: No    Sexual activity: Yes     Partners: Male     Birth control/protection: Female Surgical   Other Topics Concern    Parent/sibling w/ CABG, MI or angioplasty before 65F 55M? No   Social History Narrative    Not on file     Social Determinants of Health     Financial Resource Strain: Not on file   Food Insecurity: Not on file   Transportation Needs: Not on file   Physical Activity: Not on file   Stress: Not on file   Social Connections: Not on file   Interpersonal Safety: Low Risk  (2024)    Interpersonal Safety     Do you feel physically and emotionally safe where you currently live?: Yes     Within the past 12 months, have you been hit, slapped, kicked or otherwise physically hurt by someone?: No     Within the past 12 months, have you been humiliated or emotionally abused in other ways by your partner or ex-partner?: No   Housing Stability: Not on file       MEDICATIONS:   Current Outpatient Medications   Medication Sig Dispense Refill    acyclovir (ZOVIRAX) 400 MG tablet TAKE 1 TABLET BY MOUTH THREE TIMES DAILY FOR 5 DAYS. AS NEEDED FOR OUT BREAK 30 tablet 11    ibuprofen (ADVIL/MOTRIN) 200 MG tablet Take 1-2 tablets by  mouth every 4 hours as needed      levothyroxine (SYNTHROID/LEVOTHROID) 150 MCG tablet Take 1 tablet (150 mcg) by mouth daily 90 tablet 3    phentermine (ADIPEX-P) 37.5 MG tablet Start half tablet daily after breakfast. 45 tablet 0    rizatriptan (MAXALT) 10 MG tablet TAKE 1 TABLET BY MOUTH AS NEEDED FOR MIGRAINE, MAY REPEAT AFTER 2 HOURS AS NEEDED. MAX 2 DOSES IN 24 HOURS 18 tablet 11    Vitamin D3 (CHOLECALCIFEROL) 25 mcg (1000 units) tablet Take 1 tablet (25 mcg) by mouth daily Follow up for further refills. 90 tablet 0       ALLERGIES:   Allergies   Allergen Reactions    Amoxicillin Rash     Other reaction(s): hives     TSH   Date Value Ref Range Status   02/14/2024 4.21 (H) 0.30 - 4.20 uIU/mL Final   05/04/2023 1.25 0.40 - 4.00 mU/L Final     Last Comprehensive Metabolic Panel:  Sodium   Date Value Ref Range Status   02/14/2024 138 135 - 145 mmol/L Final     Comment:     Reference intervals for this test were updated on 09/26/2023 to more accurately reflect our healthy population. There may be differences in the flagging of prior results with similar values performed with this method. Interpretation of those prior results can be made in the context of the updated reference intervals.      Potassium   Date Value Ref Range Status   02/14/2024 4.6 3.4 - 5.3 mmol/L Final   05/27/2022 4.2 3.5 - 5.0 mmol/L Final     Chloride   Date Value Ref Range Status   02/14/2024 103 98 - 107 mmol/L Final   05/27/2022 103 98 - 107 mmol/L Final     Carbon Dioxide (CO2)   Date Value Ref Range Status   02/14/2024 27 22 - 29 mmol/L Final   05/27/2022 26 22 - 31 mmol/L Final     Anion Gap   Date Value Ref Range Status   02/14/2024 8 7 - 15 mmol/L Final   05/27/2022 10 5 - 18 mmol/L Final     Glucose   Date Value Ref Range Status   02/14/2024 93 70 - 99 mg/dL Final   05/27/2022 95 70 - 125 mg/dL Final     Urea Nitrogen   Date Value Ref Range Status   02/14/2024 6.9 6.0 - 20.0 mg/dL Final   05/27/2022 8 8 - 22 mg/dL Final     Creatinine    Date Value Ref Range Status   02/14/2024 0.37 (L) 0.51 - 0.95 mg/dL Final     GFR Estimate   Date Value Ref Range Status   02/14/2024 >90 >60 mL/min/1.73m2 Final   03/04/2021 >60 >60 mL/min/1.73m2 Final     Calcium   Date Value Ref Range Status   02/14/2024 9.2 8.6 - 10.0 mg/dL Final     Bilirubin Total   Date Value Ref Range Status   02/14/2024 0.3 <=1.2 mg/dL Final     Alkaline Phosphatase   Date Value Ref Range Status   02/14/2024 67 40 - 150 U/L Final     Comment:     Reference intervals for this test were updated on 11/14/2023 to more accurately reflect our healthy population. There may be differences in the flagging of prior results with similar values performed with this method. Interpretation of those prior results can be made in the context of the updated reference intervals.     ALT   Date Value Ref Range Status   02/14/2024 98 (H) 0 - 50 U/L Final     Comment:     Reference intervals for this test were updated on 6/12/2023 to more accurately reflect our healthy population. There may be differences in the flagging of prior results with similar values performed with this method. Interpretation of those prior results can be made in the context of the updated reference intervals.       AST   Date Value Ref Range Status   02/14/2024 79 (H) 0 - 45 U/L Final     Comment:     Reference intervals for this test were updated on 6/12/2023 to more accurately reflect our healthy population. There may be differences in the flagging of prior results with similar values performed with this method. Interpretation of those prior results can be made in the context of the updated reference intervals.               LFTs suggest an element of fatty liver disease.    Lab Results   Component Value Date    A1C 4.9 03/04/2021    A1C 5.0 07/29/2019     Recent Labs   Lab Test 06/02/23  0911 05/27/22  1011   CHOL 187 189   HDL 67 68   * 102   TRIG 64 94       TSH   Date Value Ref Range Status   02/14/2024 4.21 (H) 0.30 - 4.20  uIU/mL Final   05/04/2023 1.25 0.40 - 4.00 mU/L Final     Last Comprehensive Metabolic Panel:  Sodium   Date Value Ref Range Status   02/14/2024 138 135 - 145 mmol/L Final     Comment:     Reference intervals for this test were updated on 09/26/2023 to more accurately reflect our healthy population. There may be differences in the flagging of prior results with similar values performed with this method. Interpretation of those prior results can be made in the context of the updated reference intervals.      Potassium   Date Value Ref Range Status   02/14/2024 4.6 3.4 - 5.3 mmol/L Final   05/27/2022 4.2 3.5 - 5.0 mmol/L Final     Chloride   Date Value Ref Range Status   02/14/2024 103 98 - 107 mmol/L Final   05/27/2022 103 98 - 107 mmol/L Final     Carbon Dioxide (CO2)   Date Value Ref Range Status   02/14/2024 27 22 - 29 mmol/L Final   05/27/2022 26 22 - 31 mmol/L Final     Anion Gap   Date Value Ref Range Status   02/14/2024 8 7 - 15 mmol/L Final   05/27/2022 10 5 - 18 mmol/L Final     Glucose   Date Value Ref Range Status   02/14/2024 93 70 - 99 mg/dL Final   05/27/2022 95 70 - 125 mg/dL Final     Urea Nitrogen   Date Value Ref Range Status   02/14/2024 6.9 6.0 - 20.0 mg/dL Final   05/27/2022 8 8 - 22 mg/dL Final     Creatinine   Date Value Ref Range Status   02/14/2024 0.37 (L) 0.51 - 0.95 mg/dL Final     GFR Estimate   Date Value Ref Range Status   02/14/2024 >90 >60 mL/min/1.73m2 Final   03/04/2021 >60 >60 mL/min/1.73m2 Final     Calcium   Date Value Ref Range Status   02/14/2024 9.2 8.6 - 10.0 mg/dL Final     Bilirubin Total   Date Value Ref Range Status   02/14/2024 0.3 <=1.2 mg/dL Final     Alkaline Phosphatase   Date Value Ref Range Status   02/14/2024 67 40 - 150 U/L Final     Comment:     Reference intervals for this test were updated on 11/14/2023 to more accurately reflect our healthy population. There may be differences in the flagging of prior results with similar values performed with this method.  "Interpretation of those prior results can be made in the context of the updated reference intervals.     ALT   Date Value Ref Range Status   02/14/2024 98 (H) 0 - 50 U/L Final     Comment:     Reference intervals for this test were updated on 6/12/2023 to more accurately reflect our healthy population. There may be differences in the flagging of prior results with similar values performed with this method. Interpretation of those prior results can be made in the context of the updated reference intervals.       AST   Date Value Ref Range Status   02/14/2024 79 (H) 0 - 45 U/L Final     Comment:     Reference intervals for this test were updated on 6/12/2023 to more accurately reflect our healthy population. There may be differences in the flagging of prior results with similar values performed with this method. Interpretation of those prior results can be made in the context of the updated reference intervals.               Lab Results   Component Value Date    WBC 5.8 02/14/2024     Lab Results   Component Value Date    RBC 4.17 02/14/2024     Lab Results   Component Value Date    HGB 13.3 02/14/2024     Lab Results   Component Value Date    HCT 39.8 02/14/2024     Lab Results   Component Value Date    MCV 95 02/14/2024     Lab Results   Component Value Date    MCH 31.9 02/14/2024     Lab Results   Component Value Date    MCHC 33.4 02/14/2024     Lab Results   Component Value Date    RDW 12.2 02/14/2024     Lab Results   Component Value Date     02/14/2024     Vitamin D Deficiency Screening Results:  Lab Results   Component Value Date    VITDT 19 (L) 02/14/2024    VITDT 18 (L) 06/02/2023     Using 1000 international unit(s)/day currently of D3. We'll recheck.         PHYSICAL EXAM:  Objective    /70 (BP Location: Right arm, Patient Position: Sitting, Cuff Size: Adult Small)   Ht 1.6 m (5' 3\")   Wt 87.4 kg (192 lb 9.6 oz)   BMI 34.12 kg/m    /70 (BP Location: Right arm, Patient Position: " "Sitting, Cuff Size: Adult Small)   Ht 1.6 m (5' 3\")   Wt 87.4 kg (192 lb 9.6 oz)   BMI 34.12 kg/m    Body mass index is 34.12 kg/m .  Physical Exam   GENERAL: alert and no distress. Slight Bell's palsy facial asymmetry.   RESP: lungs clear to auscultation - no rales, rhonchi or wheezes  CV: regular rate and rhythm, normal S1 S2, no S3 or S4, no murmur, click or rub, no peripheral edema  ABDOMEN: soft, nontender, no hepatosplenomegaly, no masses and bowel sounds normal  MS: uses a walker, has AFOs bilateral legs. No tremor.         Sincerely,    Bg Prajapati MD          "

## 2024-06-07 ENCOUNTER — OFFICE VISIT (OUTPATIENT)
Dept: SURGERY | Facility: CLINIC | Age: 54
End: 2024-06-07
Attending: PHYSICIAN ASSISTANT
Payer: COMMERCIAL

## 2024-06-07 ENCOUNTER — LAB (OUTPATIENT)
Dept: LAB | Facility: CLINIC | Age: 54
End: 2024-06-07
Payer: COMMERCIAL

## 2024-06-07 VITALS
BODY MASS INDEX: 34.12 KG/M2 | DIASTOLIC BLOOD PRESSURE: 70 MMHG | SYSTOLIC BLOOD PRESSURE: 122 MMHG | HEIGHT: 63 IN | WEIGHT: 192.6 LBS

## 2024-06-07 DIAGNOSIS — R79.89 ELEVATED LFTS: ICD-10-CM

## 2024-06-07 DIAGNOSIS — K76.0 HEPATIC STEATOSIS: ICD-10-CM

## 2024-06-07 DIAGNOSIS — G71.039 MUSCULAR DYSTROPHY, LIMB GIRDLE (H): ICD-10-CM

## 2024-06-07 DIAGNOSIS — E66.811 CLASS 1 OBESITY WITH BODY MASS INDEX (BMI) OF 34.0 TO 34.9 IN ADULT, UNSPECIFIED OBESITY TYPE, UNSPECIFIED WHETHER SERIOUS COMORBIDITY PRESENT: Primary | ICD-10-CM

## 2024-06-07 DIAGNOSIS — E53.8 LOW SERUM VITAMIN B12: ICD-10-CM

## 2024-06-07 DIAGNOSIS — R79.89 LOW VITAMIN D LEVEL: ICD-10-CM

## 2024-06-07 DIAGNOSIS — E03.9 HYPOTHYROIDISM, UNSPECIFIED TYPE: ICD-10-CM

## 2024-06-07 DIAGNOSIS — E66.811 CLASS 1 OBESITY WITH BODY MASS INDEX (BMI) OF 34.0 TO 34.9 IN ADULT, UNSPECIFIED OBESITY TYPE, UNSPECIFIED WHETHER SERIOUS COMORBIDITY PRESENT: ICD-10-CM

## 2024-06-07 LAB — HBA1C MFR BLD: 5.1 % (ref 0–5.6)

## 2024-06-07 PROCEDURE — 82306 VITAMIN D 25 HYDROXY: CPT

## 2024-06-07 PROCEDURE — 80053 COMPREHEN METABOLIC PANEL: CPT

## 2024-06-07 PROCEDURE — 84443 ASSAY THYROID STIM HORMONE: CPT

## 2024-06-07 PROCEDURE — 83036 HEMOGLOBIN GLYCOSYLATED A1C: CPT

## 2024-06-07 PROCEDURE — 82607 VITAMIN B-12: CPT

## 2024-06-07 PROCEDURE — 36415 COLL VENOUS BLD VENIPUNCTURE: CPT

## 2024-06-07 PROCEDURE — 84439 ASSAY OF FREE THYROXINE: CPT

## 2024-06-07 PROCEDURE — 99215 OFFICE O/P EST HI 40 MIN: CPT | Performed by: EMERGENCY MEDICINE

## 2024-06-07 PROCEDURE — 99417 PROLNG OP E/M EACH 15 MIN: CPT | Performed by: EMERGENCY MEDICINE

## 2024-06-07 RX ORDER — PHENTERMINE HYDROCHLORIDE 37.5 MG/1
TABLET ORAL
Qty: 45 TABLET | Refills: 0 | Status: SHIPPED | OUTPATIENT
Start: 2024-06-07 | End: 2024-09-05

## 2024-06-07 ASSESSMENT — SLEEP AND FATIGUE QUESTIONNAIRES
HOW LIKELY ARE YOU TO NOD OFF OR FALL ASLEEP WHILE SITTING AND READING: SLIGHT CHANCE OF DOZING
HOW LIKELY ARE YOU TO NOD OFF OR FALL ASLEEP WHILE SITTING AND TALKING TO SOMEONE: WOULD NEVER DOZE
HOW LIKELY ARE YOU TO NOD OFF OR FALL ASLEEP IN A CAR, WHILE STOPPED FOR A FEW MINUTES IN TRAFFIC: WOULD NEVER DOZE
HOW LIKELY ARE YOU TO NOD OFF OR FALL ASLEEP WHILE LYING DOWN TO REST IN THE AFTERNOON WHEN CIRCUMSTANCES PERMIT: MODERATE CHANCE OF DOZING
HOW LIKELY ARE YOU TO NOD OFF OR FALL ASLEEP WHILE WATCHING TV: WOULD NEVER DOZE
HOW LIKELY ARE YOU TO NOD OFF OR FALL ASLEEP WHILE SITTING QUIETLY AFTER LUNCH WITHOUT ALCOHOL: WOULD NEVER DOZE
HOW LIKELY ARE YOU TO NOD OFF OR FALL ASLEEP WHEN YOU ARE A PASSENGER IN A CAR FOR AN HOUR WITHOUT A BREAK: WOULD NEVER DOZE
HOW LIKELY ARE YOU TO NOD OFF OR FALL ASLEEP WHILE SITTING INACTIVE IN A PUBLIC PLACE: WOULD NEVER DOZE

## 2024-06-07 NOTE — LETTER
"2024      Caryn Elizalde  [address] [facility] [address]      Dear Colleague,    Thank you for referring your patient, Caryn Elizalde, to the [facility] [place]. Please see a copy of my visit note below.    New Medical Weight Management Consult    PATIENT:  Caryn Elizalde  MRN:         [id number]  :         1970  KANWAL:         2024        I had the pleasure of seeing your patient, Caryn Elizalde. Full intake/assessment was done to determine barriers to weight loss success and develop a treatment plan. Caryn Elizalde is a 54 year old female interested in treatment of medical problems associated with excess weight. She has a height of 5' 3\", a weight of 192 lbs 9.6 oz, and the calculated Body mass index is 34.12 kg/m .    Caryn is a patient with mature onset class I obesity with significant element of familial/genetic influence and with current health consequences. 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  Caryn Elizalde eats a high carb diet, eats a high fat diet, uses food as a reward, uses food as mood management, has perception of intense hunger, has a disorganized meal pattern, and had accelerated appetite after quitting smoking this year.      Her problem is complicated by a hunger disorder, strong craving/reward pathways, a neurobiological disorder, gender and short stature, and perimenopausal status which can further increase appetite/decrease satiety and lower intrinsic desire for movement. 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      Review of the patient's history and habits today suggest that weight gain has been progressive in " "recent years..    Previous Interventions found to be helpful in the past for weight loss include limited success on her own. She was resistant to appetite suppressive therapy initially but was intrigued by use of phentermine given her lower overall energy in recent months and uncertain coverage for GLP1 RA therapies like Wegovy or Zepbound.    We discussed a toolbox approach to weight management today and she is open to combining mindful, reduced calorie dietary therapy with increased mindfulness techniques, activity/exercise improvements to optimize their current and future health.  Medication assistance for appetite control was discussed today and on review of the risks/benefits for this patients health history, we've decided to start with appetite suppressant therapy.    ASSESSMENT AND PLAN  Problem List Items Addressed This Visit          Digestive    Obesity            60 minutes spent by me on the date of the encounter doing chart review, history and exam, documentation and further activities per the note        She has the following co-morbidities:        6/7/2024     8:10 AM   --   I have the following health issues associated with obesity Fatty Liver   I have the following symptoms associated with obesity Lower Extremity Swelling    Fatigue            No data to display                    6/7/2024     8:10 AM   Referring Provider   Please name the provider who referred you to Medical Weight Management  If you do not know, please answer \"I Don't Know\" I can't recall his name           6/7/2024     8:10 AM   Weight History   How concerned are you about your weight? Very Concerned   I became overweight As an Adult   The following factors have contributed to my weight gain Mental Health Issues    After Quitting Smoking    Eating Wrong Types of Food    Eating Too Much    Lack of Exercise    Other   Please list the other factors LGMD and hormones   I have tried the following methods to lose weight Watching " Portions or Calories    Meal Replacements   My lowest weight since age 17 was 123   My highest weight since age 17 was 189.2   The most weight I have ever lost was (lbs) 25   I have the following family history of obesity/being overweight One or more of my siblings are overweight    Many of my relatives are overweight   How has your weight changed over the last year? Gained   How many pounds? 20   Quit smoking in January and gained weight afterwards.        6/7/2024     8:10 AM   Diet Recall Review with Patient   If you do eat breakfast, what types of food do you eat? eggs, toast, fruit   If you do eat lunch, what types of food do you typically eat? salad, sandwhich   If you do snack, what types of food do you typically eat? toast, baked goods, cheese, crackers   How many glasses of juice do you drink in a typical day? 0   How many of glasses of milk do you drink in a typical day? 0   How many 8oz glasses of sugar containing drinks such as Omari-Aid/sweet tea do you drink in a day? 0   How many cans/bottles of sugar pop/soda/tea/sports drinks do you drink in a day? 0   How many cans/bottles of diet pop/soda/tea or sports drink do you drink in a day? 3   How often do you have a drink of alcohol? Monthly or Less   If you do drink, how many drinks might you have in a day? 1 or 2           6/7/2024     8:10 AM   Eating Habits   Generally, my meals include foods like these bread, pasta, rice, potatoes, corn, crackers, sweet dessert, pop, or juice A Few Times a Week   Generally, my meals include foods like these fried meats, brats, burgers, french fries, pizza, cheese, chips, or ice cream Once a Week   Eat fast food (like McDonalds, Burger Darryn, Taco Bell) Once a Week   Eat at a buffet or sit-down restaurant Less Than Weekly   Eat most of my meals in front of the TV or computer Less Than Weekly   Often skip meals, eat at random times, have no regular eating times A Few Times a Week   Rarely sit down for a meal but snack or  graze throughout A Few Times a Week   Eat extra snacks between meals A Few Times a Week   Eat most of my food at the end of the day Less Than Weekly   Eat in the middle of the night or wake up at night to eat Never   Eat extra snacks to prevent or correct low blood sugar A Few Times a Week   Eat to prevent acid reflux or stomach pain Never   Worry about not having enough food to eat Never   I eat when I am depressed Less Than Weekly   I eat when I am stressed A Few Times a Week   I eat when I am bored A Few Times a Week   I eat when I am anxious Less Than Weekly   I eat when I am happy or as a reward Less Than Weekly   I feel hungry all the time even if I just have eaten Never   Feeling full is important to me Never   I finish all the food on my plate even if I am already full Never   I can't resist eating delicious food or walk past the good food/smell A Few Times a Week   I eat/snack without noticing that I am eating A Few Times a Week   I eat when I am preparing the meal A Few Times a Week   I eat more than usual when I see others eating Less Than Weekly   I have trouble not eating sweets, ice cream, cookies, or chips if they are around the house Everyday   I think about food all day Almost Everyday   What foods, if any, do you crave? Sweets/Candy/Chocolate           6/7/2024     8:10 AM   Amount of Food   I feel out of control when eating Monthly   I eat a large amount of food, like a loaf of bread, a box of cookies, a pint/quart of ice cream, all at once Monthly   I eat a large amount of food even when I am not hungry Never   I eat rapidly Never   I eat alone because I feel embarrassed and do not want others to see how much I have eaten Never   I eat until I am uncomfortably full Never   I feel bad, disgusted, or guilty after I overeat Never           6/7/2024     8:10 AM   Activity/Exercise History   How much of a typical 12 hour day do you spend sitting? Most of the Day   How much of a typical 12 hour day do  you spend lying down? Less Than Half the Day   How much of a typical day do you spend walking/standing? Less Than Half the Day   How many hours (not including work) do you spend on the TV/Video Games/Computer/Tablet/Phone? 2-3 Hours   How many times a week are you active for the purpose of exercise? 2-3 Times a Week   What keeps you from being more active? Pain    Other   How many total minutes do you spend doing some activity for the purpose of exercising when you exercise? More Than 30 Minutes       PAST MEDICAL HISTORY:  Past Medical History:   Diagnosis Date     Herpes      Hypothyroidism      Migraines            6/7/2024     8:10 AM   Work/Social History Reviewed With Patient   My employment status is Full-Time   My job is Finance / Sales support   How much of your job is spent on the computer or phone? 100%   How many hours do you spend commuting to work daily? 1.25   What is your marital status? Single   Who do you live with? no one   Who does the food shopping? me   Works for bank. Long hours/sedentary.         6/7/2024     8:10 AM   Mental Health History Reviewed With Patient   Have you ever been physically or sexually abused? No   How often in the past 2 weeks have you felt little interest or pleasure in doing things? Not at all   Over the past 2 weeks how often have you felt down, depressed, or hopeless? Not at all           6/7/2024     8:10 AM   Sleep History Reviewed With Patient   How many hours do you sleep at night? 7       Past Surgical History:   Procedure Laterality Date     BIOPSY       COLONOSCOPY       COLONOSCOPY N/A 1/30/2024    Procedure: COLONOSCOPY, WITH POLYPECTOMY AND BIOPSY;  Surgeon: Jo Carroll DO;  Location: [facility] OR     COLONOSCOPY N/A 1/30/2024    Procedure: COLONOSCOPY, FLEXIBLE, WITH LESION REMOVAL USING SNARE;  Surgeon: Jo Carroll DO;  Location: [facility] OR     COLONOSCOPY WITH CO2 INSUFFLATION N/A 1/30/2024    Procedure: Colonoscopy with CO2 insufflation;  Surgeon:  Jo Carroll DO;  Location: [facility] OR     GYN SURGERY         Social History     Socioeconomic History     Marital status: Single     Spouse name: Not on file     Number of children: Not on file     Years of education: Not on file     Highest education level: Not on file   Occupational History     Not on file   Tobacco Use     Smoking status: Former     Current packs/day: 0.00     Average packs/day: 0.5 packs/day for 39.2 years (19.6 ttl pk-yrs)     Types: Cigarettes     Start date: 1983     Quit date: 2024     Years since quittin.4     Smokeless tobacco: Never   Vaping Use     Vaping status: Never Used   Substance and Sexual Activity     Alcohol use: Yes     Alcohol/week: 1.0 standard drink of alcohol     Types: 1 Standard drinks or equivalent per week     Comment: MONTHLY     Drug use: No     Sexual activity: Yes     Partners: Male     Birth control/protection: Female Surgical   Other Topics Concern     Parent/sibling w/ CABG, MI or angioplasty before 65F 55M? No   Social History Narrative     Not on file     Social Determinants of Health     Financial Resource Strain: Not on file   Food Insecurity: Not on file   Transportation Needs: Not on file   Physical Activity: Not on file   Stress: Not on file   Social Connections: Not on file   Interpersonal Safety: Low Risk  (2024)    Interpersonal Safety      Do you feel physically and emotionally safe where you currently live?: Yes      Within the past 12 months, have you been hit, slapped, kicked or otherwise physically hurt by someone?: No      Within the past 12 months, have you been humiliated or emotionally abused in other ways by your partner or ex-partner?: No   Housing Stability: Not on file       MEDICATIONS:   Current Outpatient Medications   Medication Sig Dispense Refill     acyclovir (ZOVIRAX) 400 MG tablet TAKE 1 TABLET BY MOUTH THREE TIMES DAILY FOR 5 DAYS. AS NEEDED FOR OUT BREAK 30 tablet 11     ibuprofen (ADVIL/MOTRIN) 200 MG tablet  Take 1-2 tablets by mouth every 4 hours as needed       levothyroxine (SYNTHROID/LEVOTHROID) 150 MCG tablet Take 1 tablet (150 mcg) by mouth daily 90 tablet 3     rizatriptan (MAXALT) 10 MG tablet TAKE 1 TABLET BY MOUTH AS NEEDED FOR MIGRAINE, MAY REPEAT AFTER 2 HOURS AS NEEDED. MAX 2 DOSES IN 24 HOURS 18 tablet 11     Vitamin D3 (CHOLECALCIFEROL) 25 mcg (1000 units) tablet Take 1 tablet (25 mcg) by mouth daily Follow up for further refills. 90 tablet 0       ALLERGIES:   Allergies   Allergen Reactions     Amoxicillin Rash     Other reaction(s): hives     TSH   Date Value Ref Range Status   02/14/2024 4.21 (H) 0.30 - 4.20 uIU/mL Final   05/04/2023 1.25 0.40 - 4.00 mU/L Final     Last Comprehensive Metabolic Panel:  Sodium   Date Value Ref Range Status   02/14/2024 138 135 - 145 mmol/L Final     Comment:     Reference intervals for this test were updated on 09/26/2023 to more accurately reflect our healthy population. There may be differences in the flagging of prior results with similar values performed with this method. Interpretation of those prior results can be made in the context of the updated reference intervals.      Potassium   Date Value Ref Range Status   02/14/2024 4.6 3.4 - 5.3 mmol/L Final   05/27/2022 4.2 3.5 - 5.0 mmol/L Final     Chloride   Date Value Ref Range Status   02/14/2024 103 98 - 107 mmol/L Final   05/27/2022 103 98 - 107 mmol/L Final     Carbon Dioxide (CO2)   Date Value Ref Range Status   02/14/2024 27 22 - 29 mmol/L Final   05/27/2022 26 22 - 31 mmol/L Final     Anion Gap   Date Value Ref Range Status   02/14/2024 8 7 - 15 mmol/L Final   05/27/2022 10 5 - 18 mmol/L Final     Glucose   Date Value Ref Range Status   02/14/2024 93 70 - 99 mg/dL Final   05/27/2022 95 70 - 125 mg/dL Final     Urea Nitrogen   Date Value Ref Range Status   02/14/2024 6.9 6.0 - 20.0 mg/dL Final   05/27/2022 8 8 - 22 mg/dL Final     Creatinine   Date Value Ref Range Status   02/14/2024 0.37 (L) 0.51 - 0.95 mg/dL  Final     GFR Estimate   Date Value Ref Range Status   02/14/2024 >90 >60 mL/min/1.73m2 Final   03/04/2021 >60 >60 mL/min/1.73m2 Final     Calcium   Date Value Ref Range Status   02/14/2024 9.2 8.6 - 10.0 mg/dL Final     Bilirubin Total   Date Value Ref Range Status   02/14/2024 0.3 <=1.2 mg/dL Final     Alkaline Phosphatase   Date Value Ref Range Status   02/14/2024 67 40 - 150 U/L Final     Comment:     Reference intervals for this test were updated on 11/14/2023 to more accurately reflect our healthy population. There may be differences in the flagging of prior results with similar values performed with this method. Interpretation of those prior results can be made in the context of the updated reference intervals.     ALT   Date Value Ref Range Status   02/14/2024 98 (H) 0 - 50 U/L Final     Comment:     Reference intervals for this test were updated on 6/12/2023 to more accurately reflect our healthy population. There may be differences in the flagging of prior results with similar values performed with this method. Interpretation of those prior results can be made in the context of the updated reference intervals.       AST   Date Value Ref Range Status   02/14/2024 79 (H) 0 - 45 U/L Final     Comment:     Reference intervals for this test were updated on 6/12/2023 to more accurately reflect our healthy population. There may be differences in the flagging of prior results with similar values performed with this method. Interpretation of those prior results can be made in the context of the updated reference intervals.               LFTs suggest an element of fatty liver disease.    Lab Results   Component Value Date    A1C 4.9 03/04/2021    A1C 5.0 07/29/2019     Recent Labs   Lab Test 06/02/23  0911 05/27/22  1011   CHOL 187 189   HDL 67 68   * 102   TRIG 64 94       TSH   Date Value Ref Range Status   02/14/2024 4.21 (H) 0.30 - 4.20 uIU/mL Final   05/04/2023 1.25 0.40 - 4.00 mU/L Final     Last  Comprehensive Metabolic Panel:  Sodium   Date Value Ref Range Status   02/14/2024 138 135 - 145 mmol/L Final     Comment:     Reference intervals for this test were updated on 09/26/2023 to more accurately reflect our healthy population. There may be differences in the flagging of prior results with similar values performed with this method. Interpretation of those prior results can be made in the context of the updated reference intervals.      Potassium   Date Value Ref Range Status   02/14/2024 4.6 3.4 - 5.3 mmol/L Final   05/27/2022 4.2 3.5 - 5.0 mmol/L Final     Chloride   Date Value Ref Range Status   02/14/2024 103 98 - 107 mmol/L Final   05/27/2022 103 98 - 107 mmol/L Final     Carbon Dioxide (CO2)   Date Value Ref Range Status   02/14/2024 27 22 - 29 mmol/L Final   05/27/2022 26 22 - 31 mmol/L Final     Anion Gap   Date Value Ref Range Status   02/14/2024 8 7 - 15 mmol/L Final   05/27/2022 10 5 - 18 mmol/L Final     Glucose   Date Value Ref Range Status   02/14/2024 93 70 - 99 mg/dL Final   05/27/2022 95 70 - 125 mg/dL Final     Urea Nitrogen   Date Value Ref Range Status   02/14/2024 6.9 6.0 - 20.0 mg/dL Final   05/27/2022 8 8 - 22 mg/dL Final     Creatinine   Date Value Ref Range Status   02/14/2024 0.37 (L) 0.51 - 0.95 mg/dL Final     GFR Estimate   Date Value Ref Range Status   02/14/2024 >90 >60 mL/min/1.73m2 Final   03/04/2021 >60 >60 mL/min/1.73m2 Final     Calcium   Date Value Ref Range Status   02/14/2024 9.2 8.6 - 10.0 mg/dL Final     Bilirubin Total   Date Value Ref Range Status   02/14/2024 0.3 <=1.2 mg/dL Final     Alkaline Phosphatase   Date Value Ref Range Status   02/14/2024 67 40 - 150 U/L Final     Comment:     Reference intervals for this test were updated on 11/14/2023 to more accurately reflect our healthy population. There may be differences in the flagging of prior results with similar values performed with this method. Interpretation of those prior results can be made in the context  "of the updated reference intervals.     ALT   Date Value Ref Range Status   02/14/2024 98 (H) 0 - 50 U/L Final     Comment:     Reference intervals for this test were updated on 6/12/2023 to more accurately reflect our healthy population. There may be differences in the flagging of prior results with similar values performed with this method. Interpretation of those prior results can be made in the context of the updated reference intervals.       AST   Date Value Ref Range Status   02/14/2024 79 (H) 0 - 45 U/L Final     Comment:     Reference intervals for this test were updated on 6/12/2023 to more accurately reflect our healthy population. There may be differences in the flagging of prior results with similar values performed with this method. Interpretation of those prior results can be made in the context of the updated reference intervals.               Lab Results   Component Value Date    WBC 5.8 02/14/2024     Lab Results   Component Value Date    RBC 4.17 02/14/2024     Lab Results   Component Value Date    HGB 13.3 02/14/2024     Lab Results   Component Value Date    HCT 39.8 02/14/2024     Lab Results   Component Value Date    MCV 95 02/14/2024     Lab Results   Component Value Date    MCH 31.9 02/14/2024     Lab Results   Component Value Date    MCHC 33.4 02/14/2024     Lab Results   Component Value Date    RDW 12.2 02/14/2024     Lab Results   Component Value Date     02/14/2024     Vitamin D Deficiency Screening Results:  Lab Results   Component Value Date    VITDT 19 (L) 02/14/2024    VITDT 18 (L) 06/02/2023     Using 1000 international unit(s)/day currently of D3. We'll recheck.         PHYSICAL EXAM:  Objective   /70 (BP Location: Right arm, Patient Position: Sitting, Cuff Size: Adult Small)   Ht 1.6 m (5' 3\")   Wt 87.4 kg (192 lb 9.6 oz)   BMI 34.12 kg/m    /70 (BP Location: Right arm, Patient Position: Sitting, Cuff Size: Adult Small)   Ht 1.6 m (5' 3\")   Wt 87.4 kg (192 " lb 9.6 oz)   BMI 34.12 kg/m    Body mass index is 34.12 kg/m .  Physical Exam   GENERAL: alert and no distress  NECK: no adenopathy, no asymmetry, masses, or scars  RESP: lungs clear to auscultation - no rales, rhonchi or wheezes  CV: regular rate and rhythm, normal S1 S2, no S3 or S4, no murmur, click or rub, no peripheral edema  ABDOMEN: soft, nontender, no hepatosplenomegaly, no masses and bowel sounds normal  MS: no gross musculoskeletal defects noted, no edema        Sincerely,    Bg Prajapati MD              Again, thank you for allowing me to participate in the care of your patient.        Sincerely,        Bg Prajapati MD

## 2024-06-07 NOTE — PATIENT INSTRUCTIONS
"Plan:  Welcome to weight loss season. To start we'll aim for about 1300kcal/day if getting 75 grams of lean protein daily and 80 oz of water daily to nourish your weight loss.  Track intake to adjust as needed based on activity/hunger impulses. Apps like CurTran, Lose It, G-CONpal can be helpful. Free options exist with many apps, not all. Pencil and paper works fine if measuring/label reading is done.  Trial of phentermine: start half a tablet after breakfast, no later than 10am. Don't skip lunch. Consider a protein supplement if struggling to get at least 70 grams of protein daily (goal of 75).  Stop phentermine if excess stimulation side effects/racing heart beats/chest pain/vision changes/headaches/mood swings/insomnia. Don't take on sick days.   Hydrate well with water through the day.  Great work quitting smoking.   Continue good walks, core/yoga type exercises, you're already on track with near daily workouts.       What makes a person succeed with dramatic and sustained weight loss?    It's being at the right point in your life where you feel the need to lose the weight, not because anyone told you so but because of a voice inside of you that says, \"I am ready for this\".  You're now at a point where you may be feeling anxious, irritable and when you look in the mirror you do not recognize the person looking back.  Your healthy self is buried somewhere in that reflexion and you want to free it again.  This is the sort of motivation that leads to success, and it comes from you.    Because the only person that can lose that extra weight is you, I like my patients to focus on the mindset of being in Weight Loss Season.  This gives you permission to make the changes necessary to be consistent with the diet/activity and behavior changes that lead to successful, healthy weight loss.  Nearly any diet plan can work for weight loss, but keeping it healthy and nutrient based to prevent deficiencies/hair loss/fatigue " "or irritability is vital.  If you have a plan you want to try, we'll work with you to make sure no adjustments are needed to keep you healthy through your weight loss season and working with our Bariatric Dieticians you'll be given expert guidance to customize your diet plan to suit your particular needs. If you don't have your own ideas in mind, we are always happy to suggest well researched and validated plans that provide enough food to prevent hunger but still tap into your excess fat reserves and lose weight in a sustainable fashion.  There is great evidence that lean protein/healthy fat intake with good fiber intake while minimizing simple starches/carbs produces reliable/sustainable weight loss in most people. But some feel more connected to an intermittent fasting/fast mimicking or ketogenic diet.  These protocols can be hard for many to stick with and that's why we prefer the protein/healthy fat focused diets but if these alternative strategies appeal to you, we can work with you to optimize your knowledge and results with these tools.    Losing weight is a temporary commitment, but you need to be \"All In\" to have a good weight loss season.  To avoid frustration, you have to be willing to be on track 19 out of 20 days or even better than that. But, weight loss season is generally only 4-8 months in length. After that length of time, it can be hard to maintain a negative calorie balance and our brain, motivations and metabolism will usually bring you to a plateau that cannot be broken in this modern world where other commitments start to take priority. That's when we look to stabilize the weight loss you've achieved.  If you've reached your goal by that time, fantastic, and job well done.  If there is more to go, then after a few months of stabilization, we can usually attack that previous plateau and break into new territory.    Because of this time limitation, we want to really get to work right away and " "get into a sustainable routine ASAP.  One of the best predictors of how much weight you're going to lose throughout the season is how much you lose in the first 6 weeks, so prepare well and jump in with both feet.      Occasionally, people may feel like they cannot commit fully to the changes necessary and may want to change one thing at a time and \"get used to\" the idea of losing weight.  That is OK because that is where they are in their life, and they cannot fully commit for any number of reasons.  It's part of that internal motivation and they just haven't reached PRIORTY NUMBER ONE status yet. It's possible that what they need is more time to reach that point and I am always willing to work with people that want to \"dabble\", but understand, the amount of success obtained with half measures, is much less than half results. Behavior Change cannot occur until we prepare our minds, bodies and environment for what is to come, action!    As you go through your plan, look for things to keep your motivation rolling.  The most successful people have a goal or target/reward that they are working towards.  Having a reward that celebrates your new fitness, mobility and energy is the best sort because it will encourage you to do well with the weight maintenance phase and long term lifestyle changes that promotes keeping the weight off for the long term.  Usually, \"getting healthier\" or improving blood tests or losing weight so your clothes fit better is not as internally motivating as having a tangible reward.  A good weight loss season reward is one that isn't food based, is affordable, but something special:  Something you won't be getting/doing unless you achieve your goal.   It s important to keep to the rule of success:  in order to get the reward, your goal MUST be achieved. Write this reward down, where you can look at it daily and keep it in the front of your mind as you go through your weight loss season and it " will help keep you on track.    Tools that help change behavior are vital for success. The most studied and most supported tool for weight loss is nothing more than writing down your food and weight every day.  Every Day.  Accurately and completely.  When you commit to weight loss season, this information tells you whether you're getting ENOUGH food to fuel your weight loss properly as well as teaches you the interaction between different foods you eat and how your body responds with weight loss.  You'll see that sometimes after a heavy workout you don't see the scale move until 2-3 days later.  How saltier meals (chili for example) may make you retain water for 4-5 days before you see the weight come off, you'll get used to the mini-plateaus that develop after a good 3-8 lb drop in weight as well as how you break through if you keep working the diet as you should.    Weight loss is not a linear process, there are mini ups and downs.  Learning how your body loses weight and getting comfortable with that is very rewarding. The act of writing words on paper also solidifies your will power and commitment to the season of weight loss and that by itself changes your brain chemistry/appetite, motivation and prepares you for maximal success.     Behavior change is all about getting into a new routine.  The old habits and routine have to change because without changing the circumstances of how you gained your weight, it's unlikely you'll enjoy satisfying results. If you have snacking habits, like every time you walk through the kitchen you grab a little something, well, that habit has to change and be replaced by a new habit.  It can be something as simple as keeping a doodle pad on the counter that you make a few scribbles and then walk through the kitchen having not opened the cupboard, or starting with a glass of water and leaving the kitchen without anything else, or checking your food journal to see how many calories  "you have left for the day.  Boredom is the enemy as are the old habits. Break new ground and try to push those old habits into a deep hole.  There are apps/counseling options available that can help with some of the day to day urges/behaviors if you're struggling. One commercial product that does a good job is Noom.  Unfortunately, there is a subscription fee.    Finally, exercise always helps.  While not mandatory to lose weight, every little bit helps and exercise has so many other benefits that to not work it into your plan is to miss out on all the mood, sleep, stress and general health benefits that come from making yourself a little short of breath and sweaty at least 3-4 days out of the week.  The metabolism and calorie burn benefits aside, almost every chronic ailment in medicine gets better with proper, aerobic exercise.  Allow yourself to start slow and let your body prepare itself to accept harder training 4-6 weeks down the road, but start now and commit to a plan.  Whether you have the means to hire a , join a gym or just walk out your front door or go down to your basement for a video workout, get into a exercise routine and  after 3 weeks of at least 3 times a week exercise you should be at a point where you can slowly start ramping up 10% each week to our goal of at least 150-300minutes weekly of aerobic exercise and at least twice weekly resistance training/strenghtening with weights/bands or body weight exercises.     I am a big fan of modifying the free training plan, \"Couch to 5k\", for almost all of my patients. Just type it into [employer] or look it up on your smart phone sandro store.  To modify the plan,  you can use the training plan for whatever aerobic activity you do (bike/treadmill/elliptical/rower/pool/etc). During the \"jog\" intervals, you just move a little faster or harder or increase the tension or incline.  You use those little intervals to switch up the workout and recruit more " "muscles and pump the blood a little more and then recover again in the \"walk\" intervals by slowing down, decreasing the incline or turning down the tension.  3-4 days a week is not that much to ask and the benefits are enormous.  Start slow and develop the base from which you can then build on and reduce the risk of injury.  It's much more important 2-4 months from now to be enjoying your exercise then it is to over exert yourself at the start and hurt yourself.  Starting slowly allows your body to accept the training better down the road when the exercise becomes crucial for weight maintenance.  Without exercise down the road during your maintenance phase, all this hard work you are about to put in can be undone. It usually takes about 100-300 calories a day of exercise to maintain a weight loss and our focus during weight loss season is to generate the routine/activities and hobbies that make that enjoyable/sustainable.    Thanks for taking this first and most important step in your weight loss season.  Commit to it and we will cheerlead you all the way to success.  When things get tough or off track we'll offer guidance and analysis and when you reach your goal we'll celebrate your success.  In the end, it is all about your success, your health and what you do with it.      Bg Prajapati MD  [facility]  [phone]          LEAN PROTEIN SOURCES  Getting 20-30 grams of protein, 3 meals daily, is appropriate for most people, some need more but more than about 40 grams per meal is not useful.  General rule is drinking one ounce of water per gram of protein eaten over the course of the day:  70 grams of protein each day, drink 70 oz of water.  Protein Source Portion Calories Grams of Protein                           Nonfat, plain Greek yogurt    (10 grams sugar or less) 3/4 cup (6 oz)  12-17   Light Yogurt (10 grams sugar or less) 3/4 cup (6 oz)  6-8   Protein Shake 1 " shake 110-180 15-30   Skim/1% Milk or lactose-free milk 1 cup ( 8 oz)  8   Plain or light, flavored soymilk 1 cup  7-8   Plain or light, hemp milk 1 cup 110 6   Fat Free or 1% Cottage Cheese 1/2 cup 90 15   Part skim ricotta cheese 1/2 cup 100 14   Part skim or reduced fat cheese slices 1 ounce 65-80 8     Mozzarella String Cheese 1 80 8   Canned tuna, chicken, crab or salmon  (canned in water)  1/2 cup 100 15-20   White fish (broiled, grilled, baked) 3 ounces 100 21   [place]/Tuna (broiled, grilled, baked) 3 ounces 150-180 21   Shrimp, Scallops, Lobster, Crab 3 ounces 100 21   Pork loin, Pork Tenderloin 3 ounces 150 21   Boneless, skinless chicken /turkey breast                          (broiled, grilled, baked) 3 ounces 120 21   [place], [place], [place], and Venison 3 ounces 120 21   Lean cuts of red meat and pork (sirloin,   round, tenderloin, flank, ground 93%-96%) 3 ounces 170 21   Lean or Extra Lean Ground Turkey 1/2 cup 150 20   90-95% Lean [place] Burger 1 alicia 140-180 21   Low-fat casserole with lean meat 3/4 cup 200 17   Luncheon Meats                                                        (turkey, lean ham, roast beef, chicken) 3 ounces 100 21   Egg (boiled, poached, scrambled) 1 Egg 60 7   Egg Substitute 1/2 cup 70 10   Nuts (limit to 1 serving per day)  3 Tbsp. 150 7   Nut [place] (peanut, almond)  Limit to 1 serving or less daily 1 Tbsp. 90 4   Soy Burger (varies) 1  15   Garbanzo, Black, [place] Beans 1/2 cup 110 7   Refried Beans 1/2 cup 100 7   Kidney and [place]ima beans 1/2 cup 110 7   Tempeh 3 oz 175 18   Vegan crumbles 1/2 cup 100 14   Tofu 1/2 cup 110 14   Chili (beans and extra lean beef or turkey) 1 cup 200 23   Lentil Stew/Soup 1 cup 150 12   Black Bean Soup 1 cup 175 12           To help lose weight in a safe way and sustainable way, I'd like to start you on a 1300 calorie diet each day.  Understanding that every 3500 calorie deficit adds up to a pound of weight reduction, with the  combination of light aerobic exercise, some modest weight training and diet, we should be able to lose around 1 to 2.5lbs weekly depending on your starting height and weight and exercise routine with this goal intake. Dropping abut 1% total body weight weekly is exceptional weight loss and very sustainable once in a good routine.    Hunger and fatigue are the enemies of weight loss and behavior change in general.  We become much more reactive in our eating when we're hungry and tired and decrease our levels of self control.  It's not a personal failing, it's just body chemistry.   We can combat this by trying to avoid being tired and hungry at the same time.  To help this,  try to front load your calories in the first 10 hours of you day so you get into the fatigued evening hours reasonably full and you can control impulses/mindless eating a lot better and avoid those bedtime snacks/evening treats that the tired brain craves.  If you can getting your exercise in the beginning of your day has also been shown to have superior results (but anytime is better than none).  We want to build up to 150 minutes or more as you progress through the first 2-3 months of weight loss season (300 minutes weekly is ideal for maintaining weight loss for most after the end of weight loss season).     Weight loss goes through ups and downs and plateaus but if you stay on the program, you will enjoy success.   Commit to the process, try to be on track at least 19 days out of 20 and continue to think about why you're doing this and what you're working towards. If you haven't thought of your reward for hitting your weight loss goals, think about it now. Using these little victory bribes along the way helps a lot.    Finding a diet that is satisfying and repeatable-- day in and day out, improves success.  The following uses the concept of Daily Caloric Restriction, eating less every day.  An outline of how to break up the day's food is  given below.  It is a starting point and example of what a 1300 calorie diet looks like.  You can modify the listed foods to suite your particular tastes, but pay attention to portions and protein content.   Do not skip breakfast on this plan as it will leave you hungry and lead to overeating at some point during the rest of the day.  If you can make supper the last food for the day more days of the week then not, it will help a lot.  That means that the intake during those first 10-12 hours of the day and hitting your protein/intake targets for all three meals is vital to your success and evening hunger control.     Start reading labels so you know that you're getting what you think you are and start measuring foods so you can eventually look at a portion and understand how it will provide the fuel you want.    Prepping raw veggies after you buy them (washing and putting into bags/tupperware for easy access), cooking several chicken breasts/proteins for the next 2-3 lunches and generally being able to grab and go what will keep you on target when time is short will greatly aid your success.  Prepare and plan ahead and success will follow. It really only requires a couple days weekly to optimize the access to the right food at the right time of day.  Food delivery and stopping at fast food is a sign of reactive eating and usually will signal a stalling of your weight loss.    Read labels:  for protein portions/yogurt, protein bars etc looking for items with more than 10 grams of protein and less than 10 grams of sugar is very helpful.  Frozen meals should have at least 18 grams of protein, under 10 grams of sugar as well (typically around 300-380 calories).    Please note: if you've had previous bariatric surgery: wait 20-30 minutes before/after eating to drink your beverages to avoid early fullness/dumping syndrome/worsening malabsorption, early loss of fullness and hunger.  Start with eating your protein first,  slow down your meals and chew thoroughly.          1300 calorie diet:  Think Big Breakfast, Medium Lunch, smaller dinner.  Note: it's OK to consolidate the calories/protein of the mid morning snack with breakfast and the midafternoon snack with lunch if time doesn't allow or if your don't wish to have those snacks. No snacks recommended after supper. If you're prone to late afternoon nibbling, that is a great time to get your fitness in so you can get to supper without the extra.    Breakfast goal of 300 calories-350 calories (egg, 1/3 to 1/2 cup cooked old fashioned oatmeal or steel cut oats and berries,3-4oz greek yogurt.)Glass of water and if you like coffee (black) or tea.        Mid morning Snack or part of lunch, about 2-2.5 hrs later: 100 calories (cottage cheese/string cheese/ fruit or banana) and a handful of cruchy/green veggies (cucumber/celery/green peppers/broccoli).  Small glass of water    Lunch:  300 calories (3.5-4 oz tuna with little cantrell (no bread), apple, salad with drizzle of olive oil/balsamic vinegar for example)  Water    Snack:  100 calories.  4-5 oz Greek Yogurt with at least 17 grams of protein per serving.    Or Cottage cheese (lower sodium version preferable). Get this in about 2 hrs before you plan to have dinner. Protein drinks with at least 15-20 grams of protein and less than 6 grams of sugar could be used here to hit protein goals and decrease afternoon/evening hunger.  Glass of water    Dinner:  350 calories (4 oz meat or fish with cooked veggies or salad with minimal dressing, one piece of bread).  Glass of water or unsweetened tea with lemon  Dessert: 100 calories Medium Apple or Small handful of nuts, about 2/3 of an ounce (almonds/walnuts/cashews or pistachios are ideal).   Glass of water.    Try to avoid all soda and juice (low sodium V8 ok once a day).   You can do it! Embrace the healthier you and give up the inflammatory sugary treats that accelerate disease.    Choose an  activity that is fun/interesting and available to you such as  Going for a swim for 15 minutes, walking 40 minutes, elliptical 20 minutes or cycling 20 minutes as many days a week as you can.  Having a walking or workout buddy can make it even more enjoyable and keep you on track the days your motivation/energy may be lower.  If those times are too long for your fitness level, start at 10 minutes of movement and each week try to increase by 2 minutes each week.  The first 70 minutes a week (10 minutes a day only) of exercise drops the mortality rate of a sedentary person 30%!!!!  Our goal is to work up to 150 minutes or more per week of moderate to vigorous exercise  to optimize metabolism and prevent weight regain during maintenance. If time is short and your fitness allows it, high intensity interval training can be a nice way to cut the workout time in half:  warm up 2-4 minutes then 3-6 intervals of increased intensity effort (70% of max heart rate) followed by an equal amount or more of recovery before repeating, then 1-3 minutes of cooldown.     10 minutes of weight lifting can be helpful as well or using some body weight exercises like wall squats, pushups (with assistance as needed or standing/wall pushups), seat presses, yoga moves. At least twice weekly helps maintain a good strength to weight ratio, more days is better.  LPATH is a great resource for free video demonstrations.    If you are into strenuous weight lifting or prolonged exercise, use an online calculator for how many calories you've burned and if your exercise is lasting over 60 minutes, replace 20-30% of those calories burned immediately after exercise .  For the more limited exercise (less than 500 calories burned), there is no need to add extra food unless you notice a lot of hunger on your food journal.  Usually  Even a  calorie load after longer workouts is more than adequate.  For longer efforts, hunger will increase if you  "don't refuel afterwards and can get meal plan off track due to hunger, so replenish immediately after the workout to keep on the diet plan and feeling good.    Exercise example:  If you burned 1000 calories during the exercise, immediately (within 30 minutes) have a snack/replacement beverage totaling 200-300 calories and ideally have a 3:1 ratio of carbohydrate grams to protein grams to keep muscles ready for exercise the next day.  Have your next, full meal within 2-3 hours of exercise.    Tips for success:  KEEP A FOOD JOURNAL and a log of daily weights.  Pencil and paper works fine for most. Otherwise, [employer], [employer], [employer], Scrap[employer], [employer] are all good tracker apps/programs or websites for food/fitness.  Remembering to ask yourself, \"How did my nourishment affect me today\" and comparing \"good days\" to \"hungry days\" to solidify what helps your body, in your life, feel it's best while losing weight.    1.  Prepare proteins ahead of time (broil chicken breasts in bulk so you can grab and go), steel cut oats can be stored in casserole dish/bowl in the fridge for quick scoop in the morning and rewarm in microwave, make use of crock pot recipes (watch salt content).    2.  Drink a 8-12 oz glass of water every 2-3 hours when awake.  We often mistake hunger for thirst, especially when losing weight.    3. Remember your Reward and Motivation when things get hard.    4.  Weigh yourself every morning and record, you'll stay on track better and learn how your body loses weight. Don't worry about 1 or 2 day patterns, but when on track you'll notice good trend downward of weight over 3-4 day segments.    5. Call or use [employer] messaging if problems/concerns .    6.  Find a handful of meals/foods that keep you on track and get into a boring routine that is sustainable for you.    7.  Take a complete multivitamin just to make sure all micronutrients are adequate during weight loss.    8. If losing hair/brittle nails it " "often means you are not taking enough protein.  Minimum goal is 60 grams daily of protein, most people with normal kidneys do well with upwards of 100-120 grams/day of protein. Consider taking Biotin as supplement or a \"Hair and Nail\" multivitamin.  If you are hypothyroid and losing hair, see you doctor for a check up of your levels if you haven't had one recently.    9.  Getting adequate sleep is very important for starting your day properly, when we are sleep deprived, our morning appetite is suppressed and without eating an adequate breakfast, we overeat later in the day when we're tired.  Our body heals in our sleep and our mental and immune health depends on this rest.  Aim for 7-8 hours of sleep nightly if possible.  If you sleep is disturbed, perform some introspection on stressors/depression/anxiety/PTSD events or possible sleep disorders and we can trouble shoot solutions/evaulations if issues persist.    Exercise during the day, meditative breathing before bed and after waking and removing the television from the bedroom are easy ways to improve quality of sleep.      10. Relaxation.  Controlled breathing exercises can lower stress levels in the brain.  One technique is 4, 7, 8 breathing:  Place the tip of your tongue behind your front teeth, breath in through your mouth for 4 seconds, Hold it for 7 seconds and breath out slowly, making a leaky tire noise for 8 seconds.  Repeat 4 times.  Ideally do at the start and end of your day or if feeling stressed.  It works and it's why meditation/yoga/martial arts are often very breath based activities.  There are breathing techniques for alertness as well as relaxation out there and they can be quite helpful.        On-the-Go Breakfast Ideas  As of 2015, the latest research shows what a huge impact eating breakfast has on losing weight and feeling your best. People lose more weight when they make breakfast their biggest meal of the day compared to Dinner, but even " if you cannot go to that degree, getting a breakfast that has at least 20 grams of protein and even a moderate amount of fat is ideal for maintaining good energy through the day and limits overeating in the evening hours.  The following are some quick and easy suggestions for at least getting something of substance into your body in the morning.  Enjoy!    Eating breakfast within 90 minutes of waking up is an important part of taking care of your body on a restricted calorie diet plan.  After sleeping for hours, your body is in need of fuel.  An ideal breakfast is a combination of protein, whole-grain carbohydrates, or fruit.  Here s why:    -Protein digests very slowly in the body, helping you feel more satisfied.  -Whole grains provide dietary fiber, which also digests slowly and helps keep your gut clean.  -Fruit is a great source of vitamins, minerals, and fiber.     Each one of these breakfast combinations has between 200-300 calories and 15-20 grams of protein.  Feel free to mix and match!    Bone Broth (chicken bone broth or beef bone broth) is a great way to boost protein content. 8oz of bone broth will typically have 9-12grams of protein for 40kcal of energy.    Protein: Choose  -1/2 cup low-fat cottage cheese  -2 hard boiled eggs , or one cooked in olive oil (low/slow heat).  -1 low fat string cheese stick  -1 Tablespoon natural peanut butter  -Tamarac vegetarian sausage alicia (found in freezer section)  -1 slice lowfat cheese  -6 oz 2% or lowfat Greek yogurt, such as Fage or Oikos.    PLUS    Whole Grains:  Choose   -1 whole wheat English muffin  -1 whole wheat eladia, half  -1/2 Fiber One frozen muffin, thawed  -1/2 Fiber One toaster pastry  -1 whole wheat bagel thin  -1/2 cup Kashi cereal  -1 Kashi waffle (or other whole grain high-fiber waffle)  Aim for whole grain/sprouted breads with at least 3g of fiber/slice if having bread. [place] is one such brand.    OR    Fruit: Choose  -1/3 cup  blueberries  -1/2 banana (or a plantain- similar to a banana, yet smaller)  -1/2 cup cantaloupe cubes  -1 small apple  -1 small orange  -1/2 cup strawberries  -handful raspberries/blackberries (each berry is about 1 calorie).    *Adapted from [employer], Fall 20    Ten Breakfasts Under 250 calories    Ideally, getting between 350-600 calories  (depending on starting height and weight)for breakfast is ideal for avoiding hunger later in the day, adjust/add to the following accordingly:    One- 250 calories, 8.5 g protein  1 slice whole-grain toast   1 Tbsp peanut butter    banana    Two- 250 calories, 8 g protein    cup nonfat/lowfat yogurt  1/3rd cup diced no-sugar peaches  1/3rd cup cereal (like Special K, Cheerios, or bran flakes)    Three- 250 calories, 25 g protein  1 egg scrambled with 1 oz skim milk    cup shredded cheddar    whole grain English muffin  1 oz Macanese johnson  1 tsp margarine spread    Four- 225 calories, 25 g protein  1/2 cup Kashi Go-Lean cereal    cup skim milk mixed with 1 scoop Bariatric Advantage protein powder    cup no-sugar diced pears    Five- 250 calories, 20 g protein    cup oatmeal prepared with skim milk, 1 scoop protein powder, and sugar-free maple syrup    Six- 200 calories, 5 g protein  1 whole grain waffle, toasted  1 tablespoon creamy peanut or almond butter    Seven-  250 calories, 19 g protein  Breakfast sandwich: 1 slice whole grain toast, cut in half.  Add 1 scrambled egg and one slice cheddar  cheese.    Eight-  250 calories, 15 g protein  2 eggs scrambled with 1/3 cup frozen spinach (heat before adding to eggs) and 2 tablespoons low fat cream cheese.    Nine-  150 calories, 15 g protein  2/3rd cup cottage cheese    cup cantaloupe    Ten- 200 calories, 20 g protein  Fruit smoothie made with 4 oz. nonfat Greek yogurt,   cup berries, 1 scoop protein powder, and 4 oz skim milk.    Ten Lunches Under 250 Calories    Aim for lunch to be around 300-400 calories a day when  trying to lose weight and get that protein in!    One- 200 calories, 11 g protein  1/3 cup tuna salad made with light cantrell on 1 slice whole grain bread  1 small peeled apple    Two- 250 calories, 16 g protein  1/3 cup lowfat cottage cheese    cup cooked green beans    small fruit cocktail (in natural juice)    Three- 200 calories, 11 g protein    grilled cheese sandwich on whole grain bread with lowfat cheese  2/3rd cup of tomato soup    Four- 250 calories, 22 g protein  Deli wrap: 1 oz sliced turkey, 1 oz sliced ham, 1 oz sliced chicken rolled up with 1 slice low-fat cheese  1 small orange    Five- 250 calories, 28 g protein  2/3rd cup chili with 1 oz shredded cheese  4 saltine crackers    Six- 250 calories, 22 g protein  1 cup fresh spinach with 2 oz chicken, 1/3rd cup mandarin oranges, and 2 tablespoons sliced almonds with 1 tablespoon  vinaigrette dressing    Seven- 200 calories, 11 g protein  1 Tbsp sugar-free preserves and 1 Tbsp peanut butter on 1 slice whole grain toast    cup nonfat/lowfat Greek yogurt    Eight- 250 calories, 18 g protein  1 small soft-shell chicken taco with 1 oz shredded cheese, lettuce, tomato, salsa, and 1 Tbsp light sour cream    cup black beans    Nine- 225 calories, 13 g protein  2 ounces baked chicken  1/4 cup mashed potatoes    cup green beans    Ten- 200 calories, 21 g protein  Deli eladia: 2 oz roast beef or other deli meat with 1 tsp Lito mayonnaise and sliced tomato, onion, and lettuce  1/3rd cup cottage cheese      Ten Dinners Under 300 calories    If you're eating a large breakfast and medium lunch, keep dinner small.  300-400 calories is ideal for most people depending on their caloric needs.    One- 300 calories, 12 g protein  1-inch thick slice of turkey meatloaf    cup baked butternut squash    Two- 200 calories, 9 g protein  Bread-less BLT: 3 slices turkey johnson, sliced tomato, wrapped in a large lettuce leaf    cup peeled fruit    Three- 275 calories, 36 g protein  3 oz  roasted chicken    cup cooked broccoli    cup shredded cheddar cheese    cup unsweetened applesauce    Four- 200 calories, 25 g protein  3 oz baked tilapia  1/3rd cup cooked carrots    cup yogurt    Five- 250 calories, 20 g protein  Grilled ham  n  Swiss: spread 2 tsp ghee or butter on 1 slice of whole grain bread.  Cut bread in half, layer 2 oz deli ham with 1 piece of Swiss cheese and grill until cheese is melted.    cup cooked vegetables    Six- 250 calories, 18 g protein  Vegetarian cheeseburger: 1 Boca cheeseburger topped with lettuce, onion, tomato, and ketchup/mustard    cup sweet potato fries    Seven- 250 calories, 18 g protein  Pork pot roast: 2 oz roasted pork loin, 1/3rd cup roasted carrots,   medium potato, cooked with   cup gravy    Eight- 330 calories, 25 g protein  2 oz meatballs (about 2 small meatballs)    cup spaghetti sauce  1/2 piece toast topped with 1 tsp ghee or butterand topped with garlic powder, toasted in oven    Nine- 250 calories, 16 g protein  Mexican pizza: one 8  corn tortilla topped with 2 oz chicken,   cup salsa, 2 tablespoons black beans, 2 tablespoons shredded cheese.  Bake until cheese is melted.    Ten- 250 calories, 22 g protein  Shrimp stir-cheng: 3 oz cooked shrimp, 1/6th onion,   pepper,   cup chopped carrots sautéed in 1 tablespoon olive oil, topped with 2 tablespoons stir cheng sauce and a pinch of sesame seeds        150 Calories or Less Snack Ideas   1 hardboiled egg with   cup berries  1 small apple with 1 hardboiled egg  10 almonds with   cup berries  2 clementines with 1 light string cheese  1 light string cheese with   sliced apple  1 light string cheese wrapped in 2 slices of turkey  4 100% whole wheat crackers (e.g. Triscuit) with 1 light string cheese    c. cottage cheese with   cup fruit and 1 Tbsp sunflower seeds     cup cottage cheese with   of an avocado     can tuna fish with 1 cup sliced cucumbers     cup roasted garbanzo beans with paprika and cayenne  pepper    baked sweet potato with   cup chili beans or   cup cottage cheese  2 oz. nitrate free turkey slices with 1 cup carrots  1 container (6 oz) of low sugar (less than 10 grams of sugar) greek yogurt   3 Tablespoons of hummus with 1 cup sliced bell peppers   2 Tablespoons of hummus with 15 baby carrots  4 Tablespoons ranch dip made with plain Greek Yogurt and 3 mini cucumbers  1/4 cup nuts (any kind)  1 Tablespoon peanut butter with 1 stalk celery   1 dill pickle wrapped in 1-2 slices of deli ham with 1 tsp of mayonnaise/mustard.      MEDICATIONS FOR WEIGHT LOSS  There are several medications available to assist us in weight loss.  By themselves, without a mindful change in diet and increase in movement/activity these medications are disappointing in their results. However, combined with a closely monitored program of diet change and exercise they can be very effective in controlling appetite and boosting initial weight loss.  All weight loss medications need continual re-evaluation for efficacy as their side effects and health benefits fail to be worthwhile if a person is not continuing to lose weight or in maintaining their healthy weight.  Some weight loss medications are scheduled drugs, meaning there is at least a theoretical possibility for developing addiction to them, but in practice this is rare.  We do anticipate coming off meds in the future- after stabilization of weight loss is assurred.  Finally, a tolerance can develop and people s perceived efficacy of medication can diminish.  In communication with your physician, it may be appropriate to intermittently take a break from these medications and then restart again (few weeks off then restart again) if a plateau is reached that cannot be broken through.  Each person can respond to a medication differently and to be a good option for you, it will need to be affordable, effective and well tolerated with minimal side effects.    In most cases, weight  loss progress after one month and three months will be obtained and if a patient is not reaching the satisfactory progress towards weight loss, the medications may be discontinued.  The thought is that if a person is taking a weight loss medication and not receiving the potential health benefit of that drug, the side effects are not worthwhile and use should be discontinued.  On the flip side, there are many people on some weight loss medications for years because it continues to be an effective tool in their weight management and they are tolerating the medication without any long-term side effects.  Each person's response and purpose will be evaluated.    PHENTERMINE (Adipex): approved in 1959 for appetite suppression.  It has stimulant effects and cannot be used with Ritalin, Concerta, or other stimulants.  Although it is not highly addictive, it's chemically related to amphetamines which are addictive and is classified as a Controlled Substance by the [place].  Occasional dependence can develop, but rarely. The most common side effects are dry mouth, increased energy and concentration, increased pulse, and constipation.  You should not take phentermine if you have glaucoma, hyperthyroidism, or uncontrolled/untreated hypertension or overly anxious. You should stop if dramatic mood swings, severe insomnia, palpations, chest pains, visual changes or if your Blood Pressure is consistently elevated or any time it's over 160/90.   It's ok to go off the med for a few weeks and restart if efficacy is wearing off.  $24-$30 for 90 tablets at [facility]. Females are required to have reliable birth control to reduce the risk birth defects/miscarriage.    TOPIRAMATE (Topamax): Anti-seizure medication, also used to prevent migraines and sometimes for mood stabilization.  Side effects include paresthesia, glaucoma, altered concentration, attention difficulties, memory and speech problems, metabolic acidosis, depression,  increase in body temperature and decrease sweating, risk of kidney stones.  Do not take Topamax while taking Depakote as this can cause high ammonia levels.  You must have reliable birth control as Topamax can cause birth defects.  If prolonged use has occurred it should be tapered off slowly to avoid withdrawal issues.  Insurance usually covers Topiramate.  At higher doses, there may be some confusion/forgetfulness associated with this so we try to limit dose to under 75mg twice daily to reduce this risk. Often covered by insurance as it's used for many reasons.  Topamax will cause carbonated beverages to taste bad. A recheck of your kidney/electrolytes may occur within a few months of starting.    QSYMIA (Phentermine + Topamax extended release):  See above information about phentermine and Topamax.  Most common side effects are paresthesia, dizziness, distortion of taste, insomnia, constipation, and dry mouth.  See above descriptions for the two individual agents.Females are required to have reliable birth control to reduce the risk birth defects/miscarriage.  $100-200 per month but coupons/programs exist at Qsymia.com that may reduce costs depending on a patient's coverage. Has  a low, medium and higher dosing option and usually titrating upwards is expected for continued good benefit and consideration for tapering downward or using lower dose in stabilization phases.    GLP1 Agonists:  Liraglutide (Victoza/Saxenda), Semaglutide (Ozempic/Wegovy):   Part of the family of Glucagon Like Peptide Agonists, these medications directly suppresses appetite and are often used by diabetic patients due to improvements in glucose/insulin balance.  In 2024, Wegovy also has been FDA approved for reducing risks of heart attacks in those with established coronary heart disease in those that are overweight or obese. These medications also slow how quickly the stomach empties, increasing fullness. They may be hard to get covered  for non diabetics and some plans have exclusions for weight loss purposes.  Currently, these are  injectable medications delivered via autoinjector pen. It can be very costly without insurance coverage (over $500/month).  Due to high demand, there have been cycles of unavailability that can affect the supply or ramping up of these medications.  Small risks for pancreatitis exists and dose should be held if increased mid abdominal pain/burning. It is not to be used if previous Multiple Endocrine Neoplasia. In rodents, may increase risk of thyroid tumors and not indicated for anyone with a history of medullary thyroid cancer as a result.  If changes in voice/swallowing should be discontinued. Reliable birth control required in women. [email or web address], Wegovy.com has more information on these medications.  It can take up to 6 weeks for some of these medications to get out of the body after the last injection.  Should be stopped a few weeks prior to elective surgical procedures and may require re-ramping afterwards.    Zepbound (Tirzepatide):  Similar in many ways to Wegovy/Saxenda type products, works by boosting satiety signals that improve sense of fullness/satiety, boosting both GIP and GLP1 hormones. Not to be used by those with Multiple endocrine neoplasia, medullary thyroid cancer and not likely tolerated well for those with recurrent/idiopathic pancreatitis issues. Costly without insurance coverage but very effective in curbing appetite. Currently has highest average weight reduction in clinical drug trials.  Once weekly injectable therapy. Nausea/upset stomach/constipation or diarrhea are common side effects. Heart burn can increase in some, as it slows stomach emptying speeds. Should be stopped a few weeks prior to elective surgical procedures and may require re-ramping afterwards. [facility] has good patient resources/information.    Contrave (Bupropion/Naltrexone).    Synergistic combination of a mild appetite  "suppressing anti-depressant (Bupropion) whose effects are increased due to interaction with Naltrexone.  Naltrexone may have some effects on craving and is often used in addiction medicine to help previous opiate addicts be less prone to relapse as it blocks the action of opiates. Should be stopped if any need for opiate pain medication, surgery or planned procedures where you'll be given sedation/anesthesia. If prolonged use, recommend stepping down bupropion over 2-3 weeks to limit any risk of withdrawal issues. Side effects may include dry mouth, increased heart rate, mild elevation in Blood pressure;  dizziness, ringing in the ears, anxiety (typically due to bupropion), nausea, constipation, and some get fatigued with naltrexone.  About $210 on [facility] for 120 tabs of \"Contrave\", the brand name without insurance coverage. Generic Bupropion 75mg: $25 for 120 tabs, Naltrexone: $55 for 90 tabs without insurance coverage on [employer]. Cannot be used if pregnant/trying to conceive or breast feeding.    Plenity:   NO LONGER AVAILABLE due to company going bankrupt. [email or web address] has more information.      Information about the Weight Loss Medication Phentermine    When combined with mindful eating and behavior changes, weight loss medications can be a nice additional tool to maximize your weight loss season.  There are no magic pills and without diet and behavior changes, weight loss will be minimal.  Think of this medication as a tool to make your diet and behavior changes easier and you'll enjoy a higher probability of success.  Remember not to skip meals, but use this medication to tolerate your reduced calories more easily.  If you are very hungry in the evenings, you are likely not eating enough in the first 10 hours of your day and need to focus on getting your protein requirements in at each of your 3 daily meals.      Phentermine is a stimulant medication related to the amphetamine class of medication but with " "a lower risk of dependence and addiction.  It is used for weight loss by suppressing the appetite region of the brain.  It also may speed up the metabolic rate and give a person more energy.  Like any medication there are potential side effects and the most common are:  Dry mouth occurs in almost everyone (hydrate well), fewer people experience Palpatations, fast heart rate, elevation of blood pressure, restlessness, insomnia, dizziness, change in mood, tremor, headache, changes in bowel movements,itchiness, changes in sex drive.  If you are or may have become pregnant, do not use phentermine as it increases the risk for birth defects/miscarriage.  Do not use if breastfeeding.    Some people can develop serious side effects which include:  Heart strain (\"ischemia\").  Tachycardia (fast heart rate or irregular heart rate).  Hypertension  Pulmonary Hypertension  Psychosis  Dependency and abuse has occurred in some.  If you've been on high dose (37.5mg) for long periods, phentermine should be tapered down over a few weeks before abruptly stopping as seizures have been reported rarely.    We do not recommend taking it in combination with the following medications due to potential drug interactions which can increase the risk of side effects and/or potential for seizures:    Absolutely contraindicated are:  Amphetamines or other stimulants like ADHD medication: (dextroamphetamine, amphetamine, diethylpropion, isocarboxazid, methamphetamine, lisdexamfetamine, benzphetamine,dexmethylphenidate, methylphenidate, selegiline patch, sibutramine, tranylcypromine.    Avoid use with:   Dopamine, dobutamine, ephedra, ephedrine, epinephrine, isoproterenol, linezolid, norepinephrine, phenylephrine injection, venlafaxine (Effexor).    Monitor or modify dose with:  Acebutolol, atenolol, betaxolol, bisoprolol, carvedilol, droxidopa, esmolol, labetalol, magnesium citrate, metoprolol, nadolol, nebivolol, penbutolol, pindolol, propranolol, " sotalol, timolol.    Caution with: armodafinil,betaxolol eye drops, brexpiprazole, bupropion, busulfan, caffeine, carteolol drops, enzalutaminde, ginseng, green tea, guarana, levobunolol drops, lindane cream, modafinil, afrin nasal spray (oxymetazoline), pamabrom, phenylephrine oral and nasal spray, pseudoephedrine (sudafed), rasgiline, sleegiline, bowel prep, tiagabine, timolol drops,  TRAMADOL due to increased risk of seizures.    The current cheapest place to fill your prescription [facility] [place] [facility] or [facility] and is around $22for 90 tablets.  Occasionally, [facility] and [facility] have price matched, so call around and get the best price for you.  Other pharmacies may charge closer to$70- $100 for the same prescription. You don't have to be a member to use the pharmacy at [facility] currently.  An alternative some patients have tried is using a voucher system through [employer].  $25 paid on their website gets you a  voucher that can allows you to pick your meds up at [facility] without paying anything more.  [facility] may also offer discounted coupons and give prices around you.    Dosing:  We start with half a tablet for the first 2-3 weeks and if tolerating it without problems, you can take up to one full tablet daily in the morning after breakfast.  For those with evening hunger problems, sometimes half a tablet in the morning and half a tablet around 1 pm can be effective, however, risks of nighttime insomnia/restless increase with afternoon dosing so call me at the clinic if considering this regimen or having any issues.  You only have to use the amount effective for you, not to exceed one full tablet.  It can also be used situationaly and does not have to be taken every day. For more sensitive individuals,: get a pill cutter and cut the half tab into quarters and use a quarter of a tablet, about 9mg, for a couple weeks before increasing to half a tablet (18.75mg) if  "needed.  As always, if any questions give us a call at the [facility] telephone:  [phone].     Don't use Phentermine if sick/ill or using other stimulants/cold medications and it's OK to skip days that you don't feel the need for appetite suppressant assistance.  This medication works the day you take it and doesn't require \"building up\" in the system.    "

## 2024-06-08 LAB
ALBUMIN SERPL BCG-MCNC: 4.6 G/DL (ref 3.5–5.2)
ALP SERPL-CCNC: 75 U/L (ref 40–150)
ALT SERPL W P-5'-P-CCNC: 137 U/L (ref 0–50)
ANION GAP SERPL CALCULATED.3IONS-SCNC: 10 MMOL/L (ref 7–15)
AST SERPL W P-5'-P-CCNC: 97 U/L (ref 0–45)
BILIRUB SERPL-MCNC: 0.3 MG/DL
BUN SERPL-MCNC: 7.2 MG/DL (ref 6–20)
CALCIUM SERPL-MCNC: 9.6 MG/DL (ref 8.6–10)
CHLORIDE SERPL-SCNC: 105 MMOL/L (ref 98–107)
CREAT SERPL-MCNC: 0.39 MG/DL (ref 0.51–0.95)
DEPRECATED HCO3 PLAS-SCNC: 26 MMOL/L (ref 22–29)
EGFRCR SERPLBLD CKD-EPI 2021: >90 ML/MIN/1.73M2
GLUCOSE SERPL-MCNC: 94 MG/DL (ref 70–99)
POTASSIUM SERPL-SCNC: 4.6 MMOL/L (ref 3.4–5.3)
PROT SERPL-MCNC: 7.2 G/DL (ref 6.4–8.3)
SODIUM SERPL-SCNC: 141 MMOL/L (ref 135–145)
T4 FREE SERPL-MCNC: 1.86 NG/DL (ref 0.9–1.7)
TSH SERPL DL<=0.005 MIU/L-ACNC: 1.81 UIU/ML (ref 0.3–4.2)
VIT B12 SERPL-MCNC: 295 PG/ML (ref 232–1245)
VIT D+METAB SERPL-MCNC: 23 NG/ML (ref 20–50)

## 2024-06-12 RX ORDER — MAGNESIUM 200 MG
TABLET ORAL
Qty: 150 TABLET | Refills: 1 | Status: SHIPPED | OUTPATIENT
Start: 2024-06-12

## 2024-06-12 RX ORDER — CHOLECALCIFEROL (VITAMIN D3) 125 MCG
50 CAPSULE ORAL DAILY
Qty: 90 CAPSULE | Refills: 1 | Status: SHIPPED | OUTPATIENT
Start: 2024-06-12

## 2024-06-15 DIAGNOSIS — B00.9 HERPES SIMPLEX TYPE 1 INFECTION: ICD-10-CM

## 2024-06-17 RX ORDER — ACYCLOVIR 400 MG/1
TABLET ORAL
Qty: 30 TABLET | Refills: 2 | Status: SHIPPED | OUTPATIENT
Start: 2024-06-17

## 2024-06-24 ENCOUNTER — OFFICE VISIT (OUTPATIENT)
Dept: NEUROLOGY | Facility: CLINIC | Age: 54
End: 2024-06-24
Payer: COMMERCIAL

## 2024-06-24 VITALS
DIASTOLIC BLOOD PRESSURE: 79 MMHG | HEART RATE: 79 BPM | WEIGHT: 192.8 LBS | OXYGEN SATURATION: 96 % | SYSTOLIC BLOOD PRESSURE: 128 MMHG | BODY MASS INDEX: 34.15 KG/M2 | RESPIRATION RATE: 16 BRPM

## 2024-06-24 DIAGNOSIS — G71.033: Primary | ICD-10-CM

## 2024-06-24 PROCEDURE — 99203 OFFICE O/P NEW LOW 30 MIN: CPT | Performed by: PSYCHIATRY & NEUROLOGY

## 2024-06-24 ASSESSMENT — PAIN SCALES - GENERAL: PAINLEVEL: MILD PAIN (2)

## 2024-06-24 NOTE — LETTER
6/24/2024       RE: Caryn Elizalde  [address] [facility] [address]       Dear Colleague,    Thank you for referring your patient, Caryn Elizalde, to the [facility] NEUROLOGY [facility] [place] [facility]. Please see a copy of my visit note below.                 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 She reports on progression of her symptoms as it is more difficult for her ambulate. She has difficulty taking stairs and even taking curbs may be challenging which may require assistance form others.  She moved to a new one level and handicapped accesible living. She fell twice but not very common. She does walk with a walker which she is even using at home. She does report on some weakness in the core muscles. She does wear AFO which are helpful her AFO's are old and getting more uncomfortable especially on the left side.   She has been healthy otherwise and reports no other concerns.  She continues to work.  She denies difficulty sleeping.   She quit smoking.               Allergies:      Allergies   Allergen Reactions    Amoxicillin Rash     Other reaction(s): hives             Medications:     Prescription Medications as of 6/24/2024         Rx Number Disp Refills Start End Last Dispensed Date Next Fill Date Owning Pharmacy    acyclovir (ZOVIRAX) 400 MG tablet  30 tablet 2 6/17/2024 --   [id number] - [place], [place] - [address] AT [facility]    Sig: TAKE 1 TABLET BY MOUTH THREE TIMES DAILY FOR 5 DAYS AS NEEDED FOR OUT BREAK.    Class: E-Prescribe    Notes to Pharmacy: **Patient requests 90 days supply**    cholecalciferol (D3 HIGH POTENCY) 50 MCG (2000 UT) CAPS  90 capsule 1 6/12/2024 --   [facility]/pharmacy #[address] [place] [address] AT [facility]    Sig: Take 50 mcg by mouth daily    Class: E-Prescribe    Route: Oral    Cyanocobalamin (B-12) 1000 MCG SUBL  150 tablet 1 6/12/2024 --   [facility]/pharmacy #[address] [place] [address] AT [facility]    Sig: Take 3 days weekly    Class: E-Prescribe    Notes to Pharmacy: Postoperative malabsorption following bariatric surgery.    ibuprofen (ADVIL/MOTRIN) 200 MG tablet  -- --  --       Sig: Take 1-2 tablets by mouth every 4 hours as needed    Class: Historical    Route: Oral    levothyroxine (SYNTHROID/LEVOTHROID) 150 MCG tablet  90 tablet 3 7/18/2023 --   [facility]/pharmacy #[address] [place] [address]    Sig: Take 1 tablet (150 mcg) by mouth daily    Class: E-Prescribe    Route: Oral    phentermine (ADIPEX-P) 37.5 MG tablet  45 tablet 0 6/7/2024 9/5/2024   [facility]/pharmacy #[address] [place] [address] AT [facility]    Sig: Start half tablet daily after breakfast.    Class: E-Prescribe    rizatriptan (MAXALT) 10 MG tablet  18 tablet 11 4/9/2024 --   [facility]/pharmacy #[address] [place] [address] AT [facility]    Sig: TAKE 1 TABLET BY MOUTH AS NEEDED FOR MIGRAINE, MAY REPEAT AFTER 2 HOURS AS NEEDED. MAX 2 DOSES IN 24 HOURS    Class: E-Prescribe    Vitamin D3 (CHOLECALCIFEROL) 25 mcg (1000 units) tablet  90 tablet 0 2/22/2024 --   [id number] - [place] - [address] AT [facility]    Sig: Take 1 tablet  (25 mcg) by mouth daily Follow up for further refills.    Class: E-Prescribe    Route: Oral               Physical Exam:     /79 (BP Location: Right arm, Patient Position: Sitting, Cuff Size: Adult Regular)   Pulse 79   Resp 16   Wt 87.5 kg (192 lb 12.8 oz)   SpO2 96%   BMI 34.15 kg/m     Physical Exam:   General: NAD  Extremities: Warm, dry  Bilateral pes cavus with significant hindfoot varus deformity on the left.  Neurologic:   Mental Status Exam: Alert, awake and normal language and speech.   Cranial Nerves: PERRLA, EOMs intact, no nystagmus, facial movements asymmetric secondary to Bell's palsy, facial sensation intact to light touch, hearing intact to conversation, palate and uvula rise symmetrically, no deviation in uvula or tongue, tongue midline and fully mobile with no atrophy or fasciculations.    Motor: Normal tone in all four extremities, no atrophy or fasciculations.             Manual Motor Exam     Right Left A F  Right Left A F   Shoulder Abduction 4.5 4.5   Hip Flexion 3 3     Elbow Flexion 3.5 3.5   Knee Extension 4 4     Elbow Extension 4 4   Knee Flexion 4 4     Wrist Extension 5 5   Foot Dorsiflexion 2 2     Wrist flexion 4 4   Foot Plantar Flexion 1 1        Coordination: No overt dysmetria seen.    Gait:Abnormal mildly broad with steppage component.            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 She has progressive course with increasing difficulty in ambulation, high risk of falls and requirements of assistive devices for ambulation such as walker. She has progressive foot varus deformity which is painful with limited correction with AFO.    Recommendations:  - Orthopedic consult.  -New AFOs  -Return to clinic in 12 months.      I have spent at least 30 min on the date of the encounter in face-to-face evaluation, chart review, patient  visit, review of tests, counseling the patient, documentation about the issues documented above. See note for details.    Sincerely,        Torey Diaz MD  Neurology and Neuromuscular medicine  [phone]             Again, thank you for allowing me to participate in the care of your patient.      Sincerely,    Torey Diaz MD

## 2024-06-24 NOTE — NURSING NOTE
Chief Complaint   Patient presents with    RECHECK     /79 (BP Location: Right arm, Patient Position: Sitting, Cuff Size: Adult Regular)   Pulse 79   Resp 16   Wt 87.5 kg (192 lb 12.8 oz)   SpO2 96%   BMI 34.15 kg/m      ROGER CHURCH

## 2024-06-24 NOTE — PROGRESS NOTES
[facility]                  Muscular Dystrophy Clinic Note     Caryn Elizalde MRN# [id number]   YOB: 1970 Age: 54 year old      Date of Visit: Jun 24, 2024    Primary care provider: Larry Schneider    Refering physician:         Chief Complaint:     Follow up       History is obtained from the patient, family and medical record       Interval Change:      Caryn Elizalde is a 54 year old female was seen and examined at the muscular dystrophy clinic on Jun 24, 2024 for evaluation of previously diagnosed limb-girdle muscular dystrophy. She reports on progression of her symptoms as it is more difficult for her ambulate. She has difficulty taking stairs and even taking curbs may be challenging which may require assistance form others.  She moved to a new one level and handicapped accesible living. She fell twice but not very common. She does walk with a walker which she is even using at home. She does report on some weakness in the core muscles. She does wear AFO which are helpful her AFO's are old and getting more uncomfortable especially on the left side.   She has been healthy otherwise and reports no other concerns.  She continues to work.  She denies difficulty sleeping.   She quit smoking.               Allergies:      Allergies   Allergen Reactions    Amoxicillin Rash     Other reaction(s): hives             Medications:     Prescription Medications as of 6/24/2024         Rx Number Disp Refills Start End Last Dispensed Date Next Fill Date Owning Pharmacy    acyclovir (ZOVIRAX) 400 MG tablet  30 tablet 2 6/17/2024 --   [id number] - [facility] [address] AT [facility]    Sig: TAKE 1 TABLET BY MOUTH THREE TIMES DAILY FOR 5 DAYS AS NEEDED FOR OUT BREAK.    Class: E-Prescribe    Notes to Pharmacy: **Patient requests 90 days supply**    cholecalciferol (D3 HIGH POTENCY) 50 MCG (2000 UT) CAPS  90  capsule 1 6/12/2024 --   [facility]/pharmacy #[address] [place] [address] AT [facility]    Sig: Take 50 mcg by mouth daily    Class: E-Prescribe    Route: Oral    Cyanocobalamin (B-12) 1000 MCG SUBL  150 tablet 1 6/12/2024 --   [facility]/pharmacy #[address] [place] [address] AT [facility]    Sig: Take 3 days weekly    Class: E-Prescribe    Notes to Pharmacy: Postoperative malabsorption following bariatric surgery.    ibuprofen (ADVIL/MOTRIN) 200 MG tablet  -- --  --       Sig: Take 1-2 tablets by mouth every 4 hours as needed    Class: Historical    Route: Oral    levothyroxine (SYNTHROID/LEVOTHROID) 150 MCG tablet  90 tablet 3 7/18/2023 --   [facility]/pharmacy #[address] [place] [address] AT [facility]    Sig: Take 1 tablet (150 mcg) by mouth daily    Class: E-Prescribe    Route: Oral    phentermine (ADIPEX-P) 37.5 MG tablet  45 tablet 0 6/7/2024 9/5/2024   [facility]/pharmacy #[address] [place] [address] AT [facility]    Sig: Start half tablet daily after breakfast.    Class: E-Prescribe    rizatriptan (MAXALT) 10 MG tablet  18 tablet 11 4/9/2024 --   [facility]/pharmacy #[address] [place] [address] AT [facility]    Sig: TAKE 1 TABLET BY MOUTH AS NEEDED FOR MIGRAINE, MAY REPEAT AFTER 2 HOURS AS NEEDED. MAX 2 DOSES IN 24 HOURS    Class: E-Prescribe    Vitamin D3 (CHOLECALCIFEROL) 25 mcg (1000 units) tablet  90 tablet 0 2/22/2024 --   [facility]/pharmacy #[address] [place] [address] AT [facility]    Sig: Take 1 tablet (25 mcg) by mouth daily Follow up for further refills.    Class: E-Prescribe    Route: Oral               Physical Exam:     /79 (BP Location: Right arm, Patient Position: Sitting, Cuff Size: Adult Regular)   Pulse 79   Resp 16   Wt 87.5 kg (192 lb 12.8 oz)   SpO2 96%   BMI 34.15 kg/m     Physical Exam:    General: NAD  Extremities: Warm, dry  Bilateral pes cavus with significant hindfoot varus deformity on the left.  Neurologic:   Mental Status Exam: Alert, awake and normal language and speech.   Cranial Nerves: PERRLA, EOMs intact, no nystagmus, facial movements asymmetric secondary to Bell's palsy, facial sensation intact to light touch, hearing intact to conversation, palate and uvula rise symmetrically, no deviation in uvula or tongue, tongue midline and fully mobile with no atrophy or fasciculations.    Motor: Normal tone in all four extremities, no atrophy or fasciculations.             Manual Motor Exam     Right Left A F  Right Left A F   Shoulder Abduction 4.5 4.5   Hip Flexion 3 3     Elbow Flexion 3.5 3.5   Knee Extension 4 4     Elbow Extension 4 4   Knee Flexion 4 4     Wrist Extension 5 5   Foot Dorsiflexion 2 2     Wrist flexion 4 4   Foot Plantar Flexion 1 1        Coordination: No overt dysmetria seen.    Gait:Abnormal mildly broad with steppage component.            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 She has progressive course with increasing difficulty in ambulation, high risk of falls and requirements of assistive devices for ambulation such as walker. She has progressive foot varus deformity which is painful with limited correction with AFO.    Recommendations:  - Orthopedic consult.  -New AFOs  -Return to clinic in 12 months.      I have spent at least 30 min on the date of the encounter in face-to-face evaluation, chart review, patient visit, review of tests, counseling the patient, documentation about the issues documented above. See note for details.    Sincerely,        Torey Diaz MD  Neurology and Neuromuscular medicine  [phone]

## 2024-06-28 NOTE — PROGRESS NOTES
"[facility] Medicine  Progress Note    Patient Name: Radha Alfred  MRN: [id number]  Patient Class: IP- Inpatient   Admission Date: 6/1/2023  Length of Stay: 2 days  Attending Physician: Yariel Keane MD  Primary Care Provider: FRANSISCA Rodriguez        Subjective:     Principal Problem:Acute on chronic combined systolic and diastolic heart failure        HPI:  Ms. Alfred is a 66yo lady with a past medical history of breast cancer, schizophrenia, hypothyroidism, HTN, DM2, COPD, CHF, and bipolar 1.  His last TTE was 5/17/23 and showed an EF of 45%, diastolic dysfunction, PAP 45 mmHg and mild RV enlargement.    She was recently admitted to [facility] on 5/17/23 to 5/24/23 with CHF exacerbation with hypoxic respiratory failure.  She refused SNF at discharge and took none of her prescribed medications after she went home.  She was not requiring any supplemental O2 at discharge.    Ms. Alfred now comes to the ED after her daughter found her confused with bizarre behavior at home.  When asked why she came to the hospital, she tells me, "because I had to pee."  She has no recollection of the day's events and remains a little withdrawn and confused.  When she got to the ED, she was tangential, agitated and very confused, requiring multiple dose of psychotropic meds (see below) to get her to cooperate with labs and radiographs.  She was PEC'd.  She is able to tell me she has no CP or SOB, but then just states, "I want to go home."      VS in the ED were /63 -> 125/63 (BP Location: Right arm, Patient Position: Lying)   Pulse 78   Temp 98.6 °F (37 °C) (Oral)   Resp 18   Ht 5' 2" (1.575 m)   Wt 99.8 kg (220 lb)   SpO2 90 -> 88 -> 81% RA -> 97% 2L NC  Breastfeeding No   BMI 40.24 kg/m².  Labs showed Hg 8.1, MCV 66, K 2.8, Cr 0.8, TB 1.3, , Trop 0.044, UA: nitrite negative, LE 3+, WBC 25, many bacteria.    ABG RA: pH 7.46, PCO2 49, PAO2 44, sat 81%.    CXR showed the heart is " Caryn Elizalde is a 54 year old who is being evaluated via a billable video visit.        How would you like to obtain your AVS? MyChart  If the video visit is dropped, the invitation should be resent by: Text to cell phone: [phone]  Will anyone else be joining your video visit? No          Medical Weight Loss Initial Diet Evaluation  Assessment:  This patient was referred by Dr. Prajapati for MNT as treatment for Obesity which is impacting fatty liver, lower extremity swelling, fatigue.       Caryn is presenting today for a new weight management nutrition consultation. Pt has had an initial appointment with Dr. Prajapati.    Weight loss medication: Phentermine.     Anthropometrics:    Initial weight: 192.6 lbs  BMI: 34.12  Ideal body weight: 52.4 kg (115 lb 8.3 oz)  Adjusted ideal body weight: 66.4 kg (146 lb 6.9 oz)  Estimated RMR ([place]-St Jeor equation):  1,440 kcals x 1.2 (sedentary) = 1,730 kcals (for weight maintenance)  1,440 kcals x 1.3 (light active) = 1,980 kcals (for weight maintenance)    Recommended Protein Intake: 75-85 grams of protein/day    Medical History:  Patient Active Problem List   Diagnosis    Limb-girdle muscular dystrophy, type 2B (H)    Acne rosacea    Elevated platelet count    Fatty liver    Gallbladder polyp    Herpes simplex virus (HSV) infection    Hypothyroidism    Limb-girdle muscular dystrophy (H)    Right kidney stone    Screening for malignant neoplasm of cervix    Vitamin D deficiency    Adenomatous polyp of colon    Gait abnormality    Intractable chronic migraine without aura    MDRO (multiple drug resistant organisms) resistance    Menstrual migraine without status migrainosus, not intractable    Obesity    Tobacco user    Herpes simplex type 1 infection    Hypothyroidism due to Hashimoto's thyroiditis    Left-sided Bell's palsy    Vertigo    Unsteadiness on feet      Diabetes: no, A1C of 5.1% on 6/7/2024      Nutrition History:   Food  allergies/intolerances/cultural or religous food customs: No - does not like hot peppers/bell peppers, onions, mushrooms, limits soy    Weight loss history: watching portions or calories, meal replacements    -muscular dystrophy dx in 30s - has slowly progressed    -patient has been struggling with getting 75 grams of protein - discussed different options for protein with meals and snacks    Vitamins/Mineral Supplementation: vitamin D, vitamin B12      Dietary Recall:  Breakfast (6:30-7 AM): scrambled eggs (adding tomatoes and spinach) and rye toast OR eggs (adding tomatoes and spinach) and berries  Snack (10 AM): cheese sticks or greek yogurt  Lunch (12:30 PM): salad with chicken  Snack (3-3:30 PM): cheese stick or greek yogurt or almonds or apple with pb  Dinner (6:30 PM): chicken/pork with vegetable and starch    Beverages:   Water - commonly has 3 16 ounce  SF flavoring/vitamin water - 2 -16 ounce bottles    No coffee    Exercise:   1.5 mile loop - every day    Chair yoga in the morning - new routine with this    Nutrition Diagnosis (PES statement):   Obesity related to excessive energy intake as evidence by BMI of 34.12       Nutrition Intervention  Food and/or Nutrient Delivery   Placed emphasis on importance of developing a healthy meal routine, aiming for 3 meals a day and limited snacks.  Timing meals/snacks every 3-4 hours  Discussed using a protein supplement as nutrition support.  Nutrition Education   Discussed with patient how to build a meal: the importance of including a lean/low fat protein at each meal, include a source of vegetables at a minimum of lunch and dinner and limiting carbohydrate intake  Educated on sources of lean protein, portion sizes, the amount of grams found in each source. Recommend patient to aim for 20-30g protein at each meal.  Discussed the importance of adequate hydration, with emphasis on drinking 64oz of water or zero calorie beverages per day.  Nutrition Counseling  enlarged.  Primary pulmonary artery segment enlarged.  Prominent lung markings suggesting pulmonary vascular distention and mild perihilar infiltrate could also be present in the right infrahilar region.  Distribution is similar to the prior exam but overall appearing slightly less prominent today.  The cardiomediastinal silhouette appears stable.  Impression:  Chest appearing slightly improved compared to the prior exam suggesting improving CHF.    In the ED she was treated with:  Medications   LORazepam injection 2 mg (2 mg Intramuscular Given 6/1/23 1747)   diphenhydrAMINE injection 50 mg (50 mg Intramuscular Given 6/1/23 1747)   haloperidol lactate injection 10 mg (10 mg Intramuscular Given 6/1/23 1747)   furosemide injection 80 mg (80 mg Intravenous Given 6/1/23 1927)               Overview/Hospital Course:  Patient admitted for acute hypoxic respiratory failure 2/2 acute on chronic HFrEF exacerbation requiring IV diuresis and acute decompensation of psychiatric illness. Patient PEC'd. Inpatient psych consulted who recommend inpatient psych placement. Discussed patient's condition with her family members, a niece and brother. Discussed recommendations of Psychiatry and primary team that patient receive treatment in inpatient Psychiatric facility once medically stabilized. Initially started on Merrem for ESBL UTI but changed to Invanz for once daily dosing to hopefully help with administration. Patient now euvolemic and not on O2. She intermittently refuses her HF medications which will continue to be an issue for Ms. Alfred as she does not seem to understand the need to take these medications to prevent further exacerbations. Patient is medically stable and cleared for discharge to inpatient Psychiatric facility. Receiving last does of abx for UTI today.       Interval History: Taking final dose of abx this am.    Review of Systems   All other systems reviewed and are negative.  Objective:     Vital Signs (Most    Encouraged importance of developing routine exercise for health benefits and weight loss.    Goals established by patient:   Continue meal/snack routine of every 3-4 hours to fuel weight loss  Aim for 75+ grams of protein per day - 20 grams per meal and 10 per snack  Handouts provided:  Intro to MWM  Simple Snacks  Recipe Resources    Assessment/Plan:    Pt will follow up in 2 month(s) with bariatrician and PRN with dietitian.     Video-Visit Details    Type of service:  Video Visit    Video Start Time (time video started): 8:32 AM    Video End Time (time video stopped): 9:00 AM    Originating Location (pt. Location): Home      Distant Location (provider location):  Off-site    Mode of Communication:  Video Conference via [facility]    Physician has received verbal consent for a Video Visit from the patient? Yes      Lori Watters RD      Recent):  Temp: 97.6 °F (36.4 °C) (06/07/23 0748)  Pulse: 84 (06/07/23 0748)  Resp: 20 (06/07/23 0748)  BP: (!) 133/53 (06/07/23 0748)  SpO2: 95 % (06/07/23 0923) Vital Signs (24h Range):  Temp:  [97.6 °F (36.4 °C)-98.1 °F (36.7 °C)] 97.6 °F (36.4 °C)  Pulse:  [75-95] 84  Resp:  [15-21] 20  SpO2:  [89 %-100 %] 95 %  BP: (111-152)/(52-71) 133/53     Weight: 100 kg (220 lb 6.4 oz)  Body mass index is 40.31 kg/m².    Intake/Output Summary (Last 24 hours) at 6/7/2023 0929  Last data filed at 6/7/2023 0318  Gross per 24 hour   Intake 480 ml   Output --   Net 480 ml         Physical Exam      Vitals reviewed  General: NAD, Obese  Head: NC/AT  Eyes: EOMI, HONEY  Cardiovascular: Pulses intact distally, Regular Rate   Pulmonary: Normal Respiratory Rate, No respiratory distress  Gi: Soft, Non-tender  Extremities: Warm, No edema present  Skin: Warm, dry  Neuro: Alert, Oriented x3, No focal Deficit  Psych: Anxious, Paranoid, Thought process does not appear linear. Mood appears appropriate. Speech nonpressured.      Significant Labs: All pertinent labs within the past 24 hours have been reviewed.    Significant Imaging: I have reviewed all pertinent imaging results/findings within the past 24 hours.      Assessment/Plan:      * Acute on chronic combined systolic and diastolic heart failure  Patient is identified as having Combined Systolic and Diastolic heart failure that is Acute on chronic. CHF is currently uncontrolled due to Continued edema of extremities, Dyspnea not returned to baseline after 1 doses of IV diuretic and Pulmonary edema/pleural effusion on CXR. Latest ECHO performed and demonstrates- Results for orders placed during the hospital encounter of 05/17/23    Echo    Interpretation Summary  · The left ventricle is mildly enlarged with concentric hypertrophy and mildly decreased systolic function.  · The estimated ejection fraction is 45%.  · Left ventricular diastolic dysfunction.  · Mild right ventricular  enlargement.  · Normal central venous pressure (3 mmHg).  · The estimated PA systolic pressure is 45 mmHg.  · Small pericardial effusion.  · There is moderate left ventricular global hypokinesis.  · Moderate left atrial enlargement.       Continue Beta Blocker, ACE/ARB, Furosemide and Aldactone and monitor clinical status closely. Monitor on telemetry. Patient is on CHF pathway.  Monitor strict Is&Os and daily weights.  Place on fluid restriction of 1 L. Continue to stress to patient importance of self efficacy and  on diet for CHF. Last BNP reviewed- and noted below   Recent Labs   Lab 06/01/23  1820   *          Started GDMT during admission but patient intermittently refusing.    Patient refusing IV lasix. Also refusing potassium at times. Attempting to regulate psychiatric medications in order to hopefully improve adherence to medication regimen overall. TelePsych recommending inpatient stay for regulation of medications. Patient is a danger to herself because of decompensated psychiatric illness resulting in refusal to adhere to medication regimen        Acute respiratory failure with hypoxia  Not currently requiring O2      Severe obesity (BMI >= 40)  Encourage exercise, weight loss and healthy diet      Acute cystitis without hematuria  ESBL E. Coli UTI  Changed antibiotics to Invanz for ease of dosing as patient is refusing to take any medications for most of her stay due to acutely decompensated psychiatric illness  Final dose today           Hypokalemia  Continue replacement PO as needed      Microcytic anemia  Follow up with GI for scopes  Check B12, folate, Fe studies  Start PO Fe for iron deficiency - patient refusing  Continue to monitor H/H daily - 6.8 yesterday but repeat >7.0  Transfuse for Hgb <7.0         Elevated troponin level not due to acute coronary syndrome  This is more representative of type II strain ischemia from CHF  EKG with no acute ischemic  changes        Hypothyroidism  Continue home Synthroid 100mcg po qday        Schizoaffective disorder, bipolar type  She was highly confused, agitated and manic in the ED  She had to get Ativan, benadryl and Haldol to finally calm down  TelePsych consulted due to decompensated psychiatric illness   - recommend continuing risperidone 2 mg QHS as long as QTc is <500   - PEC  - inpatient hospitalization at psychiatric facility recommended    QTc <500, will administer Risperdal as long as QTc stays in appropriate range      COPD (chronic obstructive pulmonary disease)  Duonebs q4 prn wheezing and SOB  aerobika       VTE Risk Mitigation (From admission, onward)         Ordered     enoxaparin injection 40 mg  Every 12 hours         06/01/23 2056     Place JAMES hose  Until discontinued         06/01/23 2056     IP VTE HIGH RISK PATIENT  Once         06/01/23 2056     Place sequential compression device  Until discontinued         06/01/23 2056                Discharge Planning   BEE:      Code Status: Full Code   Is the patient medically ready for discharge?:     Reason for patient still in hospital (select all that apply): Pending disposition  Discharge Plan A: Psychiatric hospital   Discharge Delays: None known at this time              Yariel Keane MD  Department of Hospital Medicine   [facility]ensive [facility]

## 2024-07-01 ENCOUNTER — VIRTUAL VISIT (OUTPATIENT)
Dept: SURGERY | Facility: CLINIC | Age: 54
End: 2024-07-01
Payer: COMMERCIAL

## 2024-07-01 DIAGNOSIS — E66.9 OBESITY (BMI 30-39.9): Primary | ICD-10-CM

## 2024-07-01 DIAGNOSIS — E66.811 CLASS 1 OBESITY WITH BODY MASS INDEX (BMI) OF 34.0 TO 34.9 IN ADULT, UNSPECIFIED OBESITY TYPE, UNSPECIFIED WHETHER SERIOUS COMORBIDITY PRESENT: ICD-10-CM

## 2024-07-01 PROCEDURE — 97802 MEDICAL NUTRITION INDIV IN: CPT | Mod: 95 | Performed by: DIETITIAN, REGISTERED

## 2024-07-01 NOTE — PATIENT INSTRUCTIONS
Eat Better ? Move More ? Live Well    Eat 3 nutrient-rich meals each day     Don't skip meals--it will cause you to overeat later in the day!     Eating fiber (vegetables/fruits/whole grains) and protein with meals helps you stay full longer     Choose foods with less than 10 grams of sugar and 5 grams of fat per serving to prevent excess calories and weight re-gain   Eat around the same times each day to develop a routine eating schedule    Avoid snacking unless physically hungry.   Planned snacks: 1-2 times per day and no more than 150 calories    Eat protein first    Protein helps with healing, maintaining adequate muscle mass, reducing hunger and optimizing nutritional status    Aim for 75+ grams of protein per day   Fill up on Fiber    Fiber comes from plants--fruits, veggies, whole grains, nuts/seeds and beans    Fiber is low in calories, high in phytonutrients and helps you stay full longer    Aim for 25-35 grams per day by eating fiber with meals and snacks  Eat [id number]    Take 20-30 minutes to eat each meal by taking small bites, chewing foods to applesauce consistency or 20-30 times before you swallow    Eating foods too fast can delay satiety/fullness signals and increase overeating   Slow down your eating by using toddler utensils, putting your fork/spoon down between bites and not watching TV or emailing during meals!   Keep a Journal          Writing down what you eat, how you feel and when you are active helps you identify new changes to work on from week to week          Look for ways to cut 100 calories from your current diet 2-3 times per day  Drink 64 ounces of 0-Calorie drinks between meals    Water    Zero calorie Propel  or Vitamin Water      SoBe Lifewater  Zero Calories    Crystal Light , Sugar-Free Omari-Aid , and other sugar-free lemonade or flavored lovell    Keep Caffeine to less than 300mg per day ie: 3-6oz cups coffee     Work up to 45-60 minutes of physical activity most days of the  week    Helps with losing weight and prevent regaining those extra pounds!     Do a combo of cardio (walking/water exercises) and strength training (lifting weights/Vinyasa yoga)    Avoid Mindless Eating    Be present when you eat--take note of the smell, taste and quality of your food    Make a list of alternative activities you could do to prevent eating out of boredom/stress  Go for a walk, call a friend, chew gum, paint your nails, re-organize the garage, etc      LEAN PROTEIN SOURCES    Protein Source Portion Calories Grams of Protein                           Nonfat, plain Greek yogurt    (10 grams sugar or less) 3/4 cup (6 oz)  12-17   Light Yogurt (10 grams sugar or less) 3/4 cup (6 oz)  6-8   Protein Shake 1 shake 110-180 15-30   Skim/1% Milk or lactose-free milk 1 cup ( 8 oz)  8   Plain or light, flavored soymilk 1 cup  7-8   Plain or light, hemp milk 1 cup 110 6   Fat Free or 1% Cottage Cheese 1/2 cup 90 15   Part skim ricotta cheese 1/2 cup 100 14   Part skim or reduced fat cheese slices 1/4cup, 3 dice 65-80 8     Mozzarella String Cheese 1 80 8   Canned tuna, chicken, crab or salmon  (canned in water)  1/2 cup 100 15-20   White fish (broiled, grilled, baked) 3 ounces 100 21   [place]/Tuna (broiled, grilled, baked) 3 ounces 150-180 21   Shrimp, Scallops, Lobster, Crab 3 ounces 100 21   Pork loin, Pork Tenderloin 3 ounces 150 21   Boneless, skinless chicken /turkey breast                          (broiled, grilled, baked) 3 ounces 120 21   [place], [place], [place], and Venison 3 ounces 120 21   Lean cuts of red meat and pork (sirloin,   round, tenderloin, flank, ground 93%-96%) 3 ounces 170 21   Lean or Extra Lean Ground Turkey 1/2 cup 150 20   90-95% Lean Achille Burger 1 alicia 140-180 21   Low-fat casserole with lean meat 3/4 cup 200 17   Luncheon Meats                                                        (turkey, lean ham, roast beef, chicken) 3 ounces 100 21   Egg (boiled, poached,  scrambled) 1 Egg 60 7   Egg Substitute 1/2 cup 70 10   Nuts (limit to 1 serving per day)  3 Tbsp. 150 7   Nut Ross (peanut, almond)  Limit to 1 serving or less daily 1 Tbsp. 90 4   Soy Burger (varies) 1  10-15   Edamame  1/2 cup ~95 9   Garbanzo, Black, Ramirez Beans 1/2 cup 110 7   Refried Beans 1/2 cup 100 7   Kidney and Lima beans 1/2 cup 110 7   Tempeh 3 oz 175 18   Vegan crumbles 1/2 cup 100 14   Tofu 1/2 cup 110 14   Chili (beans and extra lean beef or turkey) 1 cup 200 23   Lentil Stew/Soup 1 cup 150 12   Black Bean Soup 1 cup 175 12     Carbohydrates  Carbohydrates fuel your body with glucose (sugar)--the energy your body needs so you can do your daily activities.  Carbohydrates offer an immediate source of energy for your body. They provide the fuel for your muscles and organs, such as your brain.     Types of Carbohydrates     Complex Carbohydrates are higher in fiber and keep you feeling full longer--helping you eat less.   These are found in nearly all plant-based foods and usually take longer for the body to digest.  They are most commonly found in whole-wheat bread, whole-grain pasta, brown rice, starchy vegetables,   and fruits  Refined Carbohydrates require almost NO WORK for digestion and break down into glucose more quickly   than complex carbohydrates. Refined carbohydrates are usually high in calories and low in nutrients and fiber--  eating more of these can lead to weight gain.  Thinking about eliminating carbohydrates???  If you do not eat enough carbohydrates, the following can occur:  Fatigue  Muscle cramps  Poor mental function  Fatigue easily results from deprivation of carbohydrates, which is seen in people who fast, possibly interfering with activities of daily living.      Thinking about eliminating carbohydrates???  If you do not eat enough carbohydrates, the following can occur:  Fatigue  Muscle cramps  Poor mental function  Fatigue easily results from deprivation of  carbohydrates, which is seen in people who fast, possibly interfering with activities of daily living    Carbohydrates are your body's first choice for fuel. If given a choice of several types of foods simultaneously, your body will use the energy from carbohydrates first.    What foods contain carbohydrates?  Choose the following foods containing carbohydrates (the BEST ones to eat):   Fruit-fresh, frozen, canned in their own juices  Whole grains:  Whole-wheat breads  Brown rice  Oatmeal  Whole-grain cereals  Other starchy foods containing a minimum of 3 grams (g) fiber/100 calories  The ingredient label should list whole wheat or whole grain as one of the first ingredients (bulgur, quinoa, buckwheat, millet, spelt, faro, kasha)  Milk or yogurt (a natural source of carbohydrates):  Low-fat milk  Fat-free milk  Yogurt   Beans or legumes     Starchy vegetables, raw or frozen:  Potatoes  Peas  Corn    AVOID or limit the following foods containing carbohydrates:  Refined sugars, such as in:  Candy  Desserts-ice cream, cakes, pies, brownies, frozen yogurt, sherbet/sorbet  Cookies  White flour: bread/pasta/crackers/rice/tortillas  Sugary snacks: sweetened cereal, granola bars, cereal bars, donuts, muffins, bagels  Sugary Drinks:  Fruit Juice, Smoothies  Sports Drinks  Regular Soda    What are typical serving sizes or portions?  The following are some serving and portion sizes for foods containing carbohydrates:  One medium piece of fruit, about 4?5 ounces (oz) (-tennis ball)  1 cup (C) berries or melon    C canned fruit    C juice (100% vegetable)    C starchy vegetables, cooked or chopped  One slice whole-grain bread  ? C brown rice, quinoa, buckwheat, millet, spelt, faro, kasha    C oatmeal (dry)    C bulgur  One small tortilla (less than 6inch diameter)    C wheat germ  1 oz pretzels     C flaked cereal        Calorie-Controlled Sample Meal Plans    Examples of small healthy meals    Breakfast   Omelet made with   cup  to   cup egg substitute or 2 eggs    cup chopped vegetables  1-2 tbsp. of light cheese     cup salsa  Medium banana    1 cup non-fat plain, Greek yogurt mixed with 1 cup berries and 1-2 Tbsp nuts or cereal   -3/4 cup skim or 1% cottage cheese    cup unsweetened whole-grain cereal  1/2 cup of fresh strawberries  Whole-wheat English muffin or mini bagel, 1 scrambled egg and 1 slice Swiss cheese   Small orange  Protein Bar or Shake (15-30 grams protein and 15-25 grams Carbohydrates)    cup cottage cheese, low-fat    cup fresh fruit    11 ounces of Slim Fast Low Carb (only), Steff's Advantage, EAS Carb Control    Lunch/Dinner  2-3 slices roasted turkey breast  1 tbsp. of fat free mayonnaise  2 slices of  whole-wheat bread, Medium apple  10 baby carrots with 1 tbsp. of low-fat dip     cup water packed tuna or chicken  1 tablespoons of low-fat mayonnaise  1-2 tbsp. dill relish  1 serving of whole-grain crackers  1 cup of strawberries   6 inch turkey sub sandwich with light mayonnaise,   cup cottage cheese                                                                                                                                                      Black bean and low-fat cheese on a whole wheat tortilla with salsa and light sour cream  Grilled chicken sandwich  Tossed salad with light dressing    Baked potato with 3/4 cup of extra lean ground beef, light shredded cheese and salsa  Fresh fruit                                                 Chicken chunks with lettuce and vegetables stuffed in eladia  Steamed broccoli                                                 3 oz boneless/skinless chicken breast  1/2 cup brown rice with stir-fried vegetables    grapefruit  3 ounces of salmon, trout, or tuna  1 cup of steamed asparagus  1 small slice whole grain Italian bread  Broiled white or pink fish  3/4 cup whole wheat pasta with tomatoes  3/4 cup of roasted red peppers  3 oz. of extra lean (93/7) hamburger on a Arnold's  [place] Thins  Tossed salad with light dressing       Black bean or Tuscan bean soup with grated mozzarella cheese    of a flour tortilla    3 ounces of grilled pork loin with 1 tbsp. of low-sugar barbeque sauce, 1 cup of green beans seasoned with pepper  Small dinner roll or   cup of grapefruit sections    1-2 cups of torn adelso    cup of garbanzo beans or diced skinless chicken breast  5-6 cherry tomatoes  1  tbsp. of crumbled feta cheese  1 tbsp. of roasted soy nuts  1 tsp. of olive oil and 2-3 Tbsp. of balsamic or red wine vinegar  Small whole-wheat dinner roll or   cup of cut up pineapple       150 Calories or Less Snack Ideas   1 hardboiled egg with   cup berries  1 small apple with 1 hardboiled egg  10 almonds with   cup berries  2 clementines with 1 light string cheese  1 light string cheese with   sliced apple  1 light string cheese wrapped in 2 slices of turkey  5 100% whole wheat crackers (e.g. Triscuit) with 1 light string cheese    c. cottage cheese with   cup fruit and 1 Tbsp sunflower seeds     cup cottage cheese with   of an avocado     can tuna fish with 1 cup sliced cucumbers   2 oz turkey slices with 1 cup carrots  1 container (6 oz) of low sugar (less than 10 grams of sugar) greek yogurt   3 Tablespoons of hummus with 1 cup sliced bell peppers, carrots or vegetable of your choice  4 Tablespoons ranch dip made with plain Greek Yogurt and 3 mini cucumbers  1/4 cup nuts (any kind)  1 Tablespoon peanut butter with 1 stalk celery   1 dill pickle wrapped in 1-2 slices of deli ham with 1 tsp of light cream cheese  5 100% whole wheat crackers (e.g. Triscuit) with 1 tsp each of guacamole/avocado topped with a cherry tomato - season with pepper or Everything Bagel Seasoning     Recipe  Resources  Websites  www.Fabrus.[employer]  www.Redu.us.[employer]arel  https://www.Smartaxi.American Apparel/recipes-ideas/recipes  https://Atreo Medical.[employer]/  www.Hypericpes.[employer]arel  https://www.Bayou[employer] Tradinggirl.[employer]arel/    Blogs  https://Yibailine.net/  Skinnytaste.com  [employer].Gigawatt.com  https://www.Immusoft.com/c/zacezarcoen - affordable meal prep  https://mealprepmXingshuai Teach.American Apparel/ - meal prep recipes    Books  The Volumetrics Eating Plan- Nila Patrick, Ph. D  The Easy 5- Ingredient Healthy Cookbook: Simple Recipes to Make Healthy Eating Delicious- Carlos Hackett, MS, RD, CDN  The 30 Minute Mediterranean Diet Cookbook- Adeline Middleton MS, RDN and Dee Herzog RDN  Weeknight Wonders: Delicious, Healthy Dinners in 30 min or Less- Darlyn Santoyo  Smart Meal Prep for Beginners- Carlos Hackett MS, ALEXUS, CDN

## 2024-07-01 NOTE — LETTER
7/1/2024      Caryn Elizalde  [address] [facility] [address]      Dear Colleague,    Thank you for referring your patient, Caryn Elizalde, to the [facility] [place]. Please see a copy of my visit note below.    Caryn Elizalde is a 54 year old who is being evaluated via a billable video visit.        How would you like to obtain your AVS? MyChart  If the video visit is dropped, the invitation should be resent by: Text to cell phone: [phone]  Will anyone else be joining your video visit? No          Medical Weight Loss Initial Diet Evaluation  Assessment:  This patient was referred by Dr. Prajapati for MNT as treatment for Obesity which is impacting fatty liver, lower extremity swelling, fatigue.       Caryn is presenting today for a new weight management nutrition consultation. Pt has had an initial appointment with Dr. Prajapati.    Weight loss medication: Phentermine.     Anthropometrics:    Initial weight: 192.6 lbs  BMI: 34.12  Ideal body weight: 52.4 kg (115 lb 8.3 oz)  Adjusted ideal body weight: 66.4 kg (146 lb 6.9 oz)  Estimated RMR (Tate-St Jeor equation):  1,440 kcals x 1.2 (sedentary) = 1,730 kcals (for weight maintenance)  1,440 kcals x 1.3 (light active) = 1,980 kcals (for weight maintenance)    Recommended Protein Intake: 75-85 grams of protein/day    Medical History:  Patient Active Problem List   Diagnosis     Limb-girdle muscular dystrophy, type 2B (H)     Acne rosacea     Elevated platelet count     Fatty liver     Gallbladder polyp     Herpes simplex virus (HSV) infection     Hypothyroidism     Limb-girdle muscular dystrophy (H)     Right kidney stone     Screening for malignant neoplasm of cervix     Vitamin D deficiency     Adenomatous polyp of colon     Gait abnormality     Intractable chronic migraine without aura     MDRO (multiple drug resistant organisms) resistance     Menstrual migraine without status migrainosus,  not intractable     Obesity     Tobacco user     Herpes simplex type 1 infection     Hypothyroidism due to Hashimoto's thyroiditis     Left-sided Bell's palsy     Vertigo     Unsteadiness on feet      Diabetes: no, A1C of 5.1% on 6/7/2024      Nutrition History:   Food allergies/intolerances/cultural or religous food customs: No - does not like hot peppers/bell peppers, onions, mushrooms, limits soy    Weight loss history: watching portions or calories, meal replacements    -muscular dystrophy dx in 30s - has slowly progressed    -patient has been struggling with getting 75 grams of protein - discussed different options for protein with meals and snacks    Vitamins/Mineral Supplementation: vitamin D, vitamin B12      Dietary Recall:  Breakfast (6:30-7 AM): scrambled eggs (adding tomatoes and spinach) and rye toast OR eggs (adding tomatoes and spinach) and berries  Snack (10 AM): cheese sticks or greek yogurt  Lunch (12:30 PM): salad with chicken  Snack (3-3:30 PM): cheese stick or greek yogurt or almonds or apple with pb  Dinner (6:30 PM): chicken/pork with vegetable and starch    Beverages:   Water - commonly has 3 16 ounce  SF flavoring/vitamin water - 2 -16 ounce bottles    No coffee    Exercise:   1.5 mile loop - every day    Chair yoga in the morning - new routine with this    Nutrition Diagnosis (PES statement):   Obesity related to excessive energy intake as evidence by BMI of 34.12       Nutrition Intervention  Food and/or Nutrient Delivery   Placed emphasis on importance of developing a healthy meal routine, aiming for 3 meals a day and limited snacks.  Timing meals/snacks every 3-4 hours  Discussed using a protein supplement as nutrition support.  Nutrition Education   Discussed with patient how to build a meal: the importance of including a lean/low fat protein at each meal, include a source of vegetables at a minimum of lunch and dinner and limiting carbohydrate intake  Educated on sources of lean  protein, portion sizes, the amount of grams found in each source. Recommend patient to aim for 20-30g protein at each meal.  Discussed the importance of adequate hydration, with emphasis on drinking 64oz of water or zero calorie beverages per day.  Nutrition Counseling   Encouraged importance of developing routine exercise for health benefits and weight loss.    Goals established by patient:   Continue meal/snack routine of every 3-4 hours to fuel weight loss  Aim for 75+ grams of protein per day - 20 grams per meal and 10 per snack  Handouts provided:  Intro to MWM  Simple Snacks  Recipe Resources    Assessment/Plan:    Pt will follow up in 2 month(s) with bariatrician and PRN with dietitian.     Video-Visit Details    Type of service:  Video Visit    Video Start Time (time video started): 8:32 AM    Video End Time (time video stopped): 9:00 AM    Originating Location (pt. Location): Home      Distant Location (provider location):  Off-site    Mode of Communication:  Video Conference via [facility]    Physician has received verbal consent for a Video Visit from the patient? Yes      Lori Watters RD         Again, thank you for allowing me to participate in the care of your patient.        Sincerely,        Lori Watters RD

## 2024-07-06 DIAGNOSIS — Z76.0 ENCOUNTER FOR MEDICATION REFILL: ICD-10-CM

## 2024-07-08 RX ORDER — LEVOTHYROXINE SODIUM 150 UG/1
150 TABLET ORAL DAILY
Qty: 90 TABLET | Refills: 0 | Status: SHIPPED | OUTPATIENT
Start: 2024-07-08 | End: 2024-09-13

## 2024-07-30 ENCOUNTER — OFFICE VISIT (OUTPATIENT)
Dept: PODIATRY | Facility: CLINIC | Age: 54
End: 2024-07-30
Attending: PSYCHIATRY & NEUROLOGY
Payer: COMMERCIAL

## 2024-07-30 VITALS
BODY MASS INDEX: 33.66 KG/M2 | DIASTOLIC BLOOD PRESSURE: 78 MMHG | SYSTOLIC BLOOD PRESSURE: 160 MMHG | HEART RATE: 88 BPM | WEIGHT: 190 LBS

## 2024-07-30 DIAGNOSIS — G71.033: ICD-10-CM

## 2024-07-30 DIAGNOSIS — M79.672 PAIN, FOOT, LEFT, CHRONIC: Primary | ICD-10-CM

## 2024-07-30 DIAGNOSIS — G89.29 PAIN, FOOT, LEFT, CHRONIC: Primary | ICD-10-CM

## 2024-07-30 DIAGNOSIS — S92.902A MULTIPLE CLOSED FRACTURES OF LEFT FOOT, INITIAL ENCOUNTER: ICD-10-CM

## 2024-07-30 PROCEDURE — 99244 OFF/OP CNSLTJ NEW/EST MOD 40: CPT | Performed by: PODIATRIST

## 2024-07-30 NOTE — LETTER
7/30/2024      Caryn Elizalde  [address]   [place] [address]      Dear Colleague,    Thank you for referring your patient, Caryn Elizalde, to the [facility]. Please see a copy of my visit note below.    Subjective:    Pt is seen today in consult from Dr. Diaz for left foot pain.  Patient has limb-girdle muscular dystrophy.  It is progressive and slowly getting worse.  High risk for falls.  She has AFOs.  Having pain with a left AFO.  Points to styloid process.  Aggravated by activity and relieved by rest.  Denies erythema blistering or breakdown.  No pain on the right AFO.  She  would like new AFO.  Also recently fell 7/14.  She states has fallen numerous times.  Has swelling on her foot.  Denies bruising.  Patient went to ED.  She is now wearing CAM Walker for several broken bones in her foot.  States CAM Walker comfortable and actually offloading styloid process.  Denies calf pain or shortness of breath.      ROS:  A 10-point review of systems was performed and is positive for that noted in the HPI and as seen above.  All other areas are negative.          Allergies   Allergen Reactions     Amoxicillin Rash     Other reaction(s): hives       Current Outpatient Medications   Medication Sig Dispense Refill     acyclovir (ZOVIRAX) 400 MG tablet TAKE 1 TABLET BY MOUTH THREE TIMES DAILY FOR 5 DAYS AS NEEDED FOR OUT BREAK. 30 tablet 2     cholecalciferol (D3 HIGH POTENCY) 50 MCG (2000 UT) CAPS Take 50 mcg by mouth daily 90 capsule 1     Cyanocobalamin (B-12) 1000 MCG SUBL Take 3 days weekly 150 tablet 1     ibuprofen (ADVIL/MOTRIN) 200 MG tablet Take 1-2 tablets by mouth every 4 hours as needed       levothyroxine (SYNTHROID/LEVOTHROID) 150 MCG tablet TAKE 1 TABLET BY MOUTH EVERY DAY 90 tablet 0     phentermine (ADIPEX-P) 37.5 MG tablet Start half tablet daily after breakfast. 45 tablet 0     rizatriptan (MAXALT) 10 MG tablet TAKE 1 TABLET BY MOUTH AS NEEDED FOR  MIGRAINE, MAY REPEAT AFTER 2 HOURS AS NEEDED. MAX 2 DOSES IN 24 HOURS 18 tablet 11     Vitamin D3 (CHOLECALCIFEROL) 25 mcg (1000 units) tablet Take 1 tablet (25 mcg) by mouth daily Follow up for further refills. (Patient not taking: Reported on 6/24/2024) 90 tablet 0       Patient Active Problem List   Diagnosis     Limb-girdle muscular dystrophy, type 2B (H)     Acne rosacea     Elevated platelet count     Fatty liver     Gallbladder polyp     Herpes simplex virus (HSV) infection     Hypothyroidism     Limb-girdle muscular dystrophy (H)     Right kidney stone     Screening for malignant neoplasm of cervix     Vitamin D deficiency     Adenomatous polyp of colon     Gait abnormality     Intractable chronic migraine without aura     MDRO (multiple drug resistant organisms) resistance     Menstrual migraine without status migrainosus, not intractable     Obesity     Tobacco user     Herpes simplex type 1 infection     Hypothyroidism due to Hashimoto's thyroiditis     Left-sided Bell's palsy     Vertigo     Unsteadiness on feet       Past Medical History:   Diagnosis Date     Herpes      Hypothyroidism      Migraines        Past Surgical History:   Procedure Laterality Date     BIOPSY       COLONOSCOPY       COLONOSCOPY N/A 1/30/2024    Procedure: COLONOSCOPY, WITH POLYPECTOMY AND BIOPSY;  Surgeon: Jo Carroll DO;  Location: [facility] OR     COLONOSCOPY N/A 1/30/2024    Procedure: COLONOSCOPY, FLEXIBLE, WITH LESION REMOVAL USING SNARE;  Surgeon: Jo Carroll DO;  Location: [facility] OR     COLONOSCOPY WITH CO2 INSUFFLATION N/A 1/30/2024    Procedure: Colonoscopy with CO2 insufflation;  Surgeon: Jo Carroll DO;  Location: [facility] OR     GYN SURGERY         Family History   Problem Relation Age of Onset     No Known Problems Mother      Alcoholism Father      Substance Abuse Father      Breast Cancer Paternal Aunt      Diabetes Brother      Cerebrovascular Disease Maternal Grandfather      Substance Abuse Sister       Ovarian Cancer No family hx of        Social History     Tobacco Use     Smoking status: Former     Current packs/day: 0.00     Average packs/day: 0.5 packs/day for 39.2 years (19.6 ttl pk-yrs)     Types: Cigarettes     Start date: 1983     Quit date: 2024     Years since quittin.5     Smokeless tobacco: Never   Substance Use Topics     Alcohol use: Yes     Alcohol/week: 1.0 standard drink of alcohol     Types: 1 Standard drinks or equivalent per week     Comment: MONTHLY             O:  BP (!) 160/78   Pulse 88   Wt 86.2 kg (190 lb)   BMI 33.66 kg/m  .       Constitutional/ general:  Pt is in no apparent distress, appears well-nourished.  Cooperative with history and physical exam.     Psych:  The patient answered questions appropriately.  Normal affect.  Seems to have reasonable expectations, in terms of treatment.     Eyes:  Visual scanning/ tracking without deficit.     Ears:  Response to auditory stimuli is normal.  negative hearing aid devices.  Auricles in proper alignment.     Lymphatic:  Popliteal lymph nodes not enlarged.     Lungs:  Non labored breathing, non labored speech. No cough.  No audible wheezing. Even, quiet breathing.       Vascular:  Pedal pulses are palpable bilaterally for both the DP and PT arteries.  CFT < 3 sec.  No edema.  Pedal hair growth noted.     Neuro:  Alert and oriented x 3.  Muscle strength weak.  Light touch diminished.    Derm: Normal texture and turgor.  No erythema, ecchymosis, or cyanosis.  No open lesions.     Musculoskeletal: Cavus foot with weightbearing bilateral.  No forefoot or rear foot deformities noted.  Left foot more inverted.  Prominent styloid process left foot.  Slight callus seen.  No skin breakdown.  No masses or bursa formation.  Edema noted on the dorsum of the left foot.  No ecchymosis or erythema.  No crepitus.  Slight pain on palpation medial tarsometatarsal joint.  Patient walking with a walker.    Radiographic Exam:  X-Ray Findings:  I  personally reviewed the films  CT Foot Left w/o Contrast  Order: [id number]  Impression    1.  Small, acute appearing, nondisplaced fractures involving the proximal, dorsal bases of the second and third metatarsals as well as the distal, dorsal aspects of the medial, middle and lateral cuneiforms.  2.  No subluxation is identified at the second TMT joint on this nonweightbearing study. MRI could be performed to evaluate the Lisfranc ligament as clinically indicated. MRI would also be more sensitive for nondisplaced fractures given the degree of osseous demineralization.  3.  There is some soft tissue edema over the dorsal aspect of the midfoot/hindfoot, without a discrete drainable collection.  Narrative    For Patients: As a result of the 21st Century Cures Act, medical imaging exams and procedure reports are released immediately into your electronic medical record. You may view this report before your referring provider. If you have questions, please contact your health care provider.    EXAM: CT FOOT LEFT WO  LOCATION: [facility]  DATE: 7/15/2024    INDICATION: free text)->Lisfranc injury.  COMPARISON: None.  TECHNIQUE: Noncontrast. Axial, sagittal and coronal thin-section reconstruction. Dose reduction techniques were used.    FINDINGS:    BONES:  -There is diffuse osseous demineralization.    There are small, acute appearing, nondisplaced fractures involving the proximal, dorsal bases of the second and third metatarsals as well as the distal, dorsal aspects of the medial and middle and lateral cuneiforms.    No subluxation is identified at the second TMT joint to suggest Lisfranc ligament injury on this nonweightbearing study.    Mild great toe interphalangeal degenerative arthrosis. A small ossific fragment along the lateral aspect of the navicular is favored to be chronic.    SOFT TISSUES:  -There is some soft tissue edema over the dorsal aspect of the midfoot/hindfoot, without a drainable fluid  collection.  Images on Order [id number]    Scan on 7/15/2024: CT FOOT LEFT WO        Exam End: 07/15/24  8:13 AM    Specimen Collected: 07/15/24  8:13 AM Last Resulted: 07/15/24  9:38 AM       Impression    No fracture or degenerative changes. Osteopenia. Tiny plantar calcaneal spur. Ankle mortise is intact.  Narrative    For Patients: As a result of the 21st Century Cures Act, medical imaging exams and procedure reports are released immediately into your electronic medical record. You may view this report before your referring provider. If you have questions, please contact your health care provider.    [facility]  XR FOOT 3 VIEWS LEFT, XR ANKLE 3 VIEWS LEFT  7/15/2024 7:37 AM CDT               Component  Ref Range & Units 1 mo ago 3 yr ago 5 yr ago    Hemoglobin A1C  0.0 - 5.6 % 5.1 4.9 R 5.0 R                  Component  Ref Range & Units 1 mo ago 5 mo ago    Vitamin D, Total (25-Hydroxy)  20 - 50 ng/mL 23 19 Low  CM              A:    Limb-girdle muscular dystrophy  Left styloid process pain  Left foot numerous fractures    Plan:  X-rays, CT scan results reviewed.  Reviewed recent labs.  Reviewed recent notes regarding her injury.  Reviewed recent neurology note.  Discussed numerous fractures at Lisfranc's joint.  There is no subluxation.  Discussed could get MRI to evaluate Lisfranc's ligament.  Since decreased sensation and walking with AFOs we will continue to treat conservatively.  Will continue with cam walker.  Explained cause of styloid process pain.  Discussed no new masses.  Explained bone is always been present but now under more pressure.  Wrote prescription for new AFO to keep this offloaded.  Watch feet carefully for any breakdown in skin or signs of infection.  RTC as needed.  Thank you for allowing me participate in the care of this patient.        Dann Jefferson DPM, FACFAS         Again, thank you for allowing me to participate in the care of your patient.        Sincerely,        Dann JANG  YULIANA Jefferson

## 2024-07-30 NOTE — PATIENT INSTRUCTIONS
We wish you continued good healing. If you have any questions or concerns, please do not hesitate to contact us at  [phone]    [employer] (secure e-mail communication and access to your chart) to send a message or to make an appointment.    Please remember to call and schedule a follow up appointment if one was recommended at your earliest convenience.     PODIATRY CLINIC HOURS  TELEPHONE NUMBER    Dr. Dann HASSANPSHERINE FACFAS        Clinics:  [facility]   [place]  Tuesday 1PM-6PM  [place]  Wednesday 745AM-330PM  [place]  Monday 2nd,4th  830AM-4PM  Thursday/Friday 745AM-230PM     [place] APPOINTMENTS  [phone]    [place] APPOINTMENTS  [phone]          If you need a medication refill, please contact us you may need lab work and/or a follow up visit prior to your refill (i.e. Antifungal medications).  If MRI needed please call Imaging at [phone]   HOW DO I GET MY KNEE SCOOTER? Knee scooters can be picked up at ANY Medical Supply stores with your knee scooter Prescription.  OR  Bring your signed prescription to an [facility] showroom.   Set up an appointment for your custom Orthotics. Call any Orthotics locations call [phone]           
No

## 2024-07-31 NOTE — PROGRESS NOTES
Subjective:    Pt is seen today in consult from Dr. Diaz for left foot pain.  Patient has limb-girdle muscular dystrophy.  It is progressive and slowly getting worse.  High risk for falls.  She has AFOs.  Having pain with a left AFO.  Points to styloid process.  Aggravated by activity and relieved by rest.  Denies erythema blistering or breakdown.  No pain on the right AFO.  She  would like new AFO.  Also recently fell 7/14.  She states has fallen numerous times.  Has swelling on her foot.  Denies bruising.  Patient went to ED.  She is now wearing CAM Walker for several broken bones in her foot.  States CAM Walker comfortable and actually offloading styloid process.  Denies calf pain or shortness of breath.      ROS:  A 10-point review of systems was performed and is positive for that noted in the HPI and as seen above.  All other areas are negative.          Allergies   Allergen Reactions    Amoxicillin Rash     Other reaction(s): hives       Current Outpatient Medications   Medication Sig Dispense Refill    acyclovir (ZOVIRAX) 400 MG tablet TAKE 1 TABLET BY MOUTH THREE TIMES DAILY FOR 5 DAYS AS NEEDED FOR OUT BREAK. 30 tablet 2    cholecalciferol (D3 HIGH POTENCY) 50 MCG (2000 UT) CAPS Take 50 mcg by mouth daily 90 capsule 1    Cyanocobalamin (B-12) 1000 MCG SUBL Take 3 days weekly 150 tablet 1    ibuprofen (ADVIL/MOTRIN) 200 MG tablet Take 1-2 tablets by mouth every 4 hours as needed      levothyroxine (SYNTHROID/LEVOTHROID) 150 MCG tablet TAKE 1 TABLET BY MOUTH EVERY DAY 90 tablet 0    phentermine (ADIPEX-P) 37.5 MG tablet Start half tablet daily after breakfast. 45 tablet 0    rizatriptan (MAXALT) 10 MG tablet TAKE 1 TABLET BY MOUTH AS NEEDED FOR MIGRAINE, MAY REPEAT AFTER 2 HOURS AS NEEDED. MAX 2 DOSES IN 24 HOURS 18 tablet 11    Vitamin D3 (CHOLECALCIFEROL) 25 mcg (1000 units) tablet Take 1 tablet (25 mcg) by mouth daily Follow up for further refills. (Patient not taking: Reported on 6/24/2024) 90  tablet 0       Patient Active Problem List   Diagnosis    Limb-girdle muscular dystrophy, type 2B (H)    Acne rosacea    Elevated platelet count    Fatty liver    Gallbladder polyp    Herpes simplex virus (HSV) infection    Hypothyroidism    Limb-girdle muscular dystrophy (H)    Right kidney stone    Screening for malignant neoplasm of cervix    Vitamin D deficiency    Adenomatous polyp of colon    Gait abnormality    Intractable chronic migraine without aura    MDRO (multiple drug resistant organisms) resistance    Menstrual migraine without status migrainosus, not intractable    Obesity    Tobacco user    Herpes simplex type 1 infection    Hypothyroidism due to Hashimoto's thyroiditis    Left-sided Bell's palsy    Vertigo    Unsteadiness on feet       Past Medical History:   Diagnosis Date    Herpes     Hypothyroidism     Migraines        Past Surgical History:   Procedure Laterality Date    BIOPSY      COLONOSCOPY      COLONOSCOPY N/A 2024    Procedure: COLONOSCOPY, WITH POLYPECTOMY AND BIOPSY;  Surgeon: Jo Carroll DO;  Location: [facility] OR    COLONOSCOPY N/A 2024    Procedure: COLONOSCOPY, FLEXIBLE, WITH LESION REMOVAL USING SNARE;  Surgeon: Jo Carroll DO;  Location: [facility] OR    COLONOSCOPY WITH CO2 INSUFFLATION N/A 2024    Procedure: Colonoscopy with CO2 insufflation;  Surgeon: Jo Carroll DO;  Location: [facility] OR    GYN SURGERY         Family History   Problem Relation Age of Onset    No Known Problems Mother     Alcoholism Father     Substance Abuse Father     Breast Cancer Paternal Aunt     Diabetes Brother     Cerebrovascular Disease Maternal Grandfather     Substance Abuse Sister     Ovarian Cancer No family hx of        Social History     Tobacco Use    Smoking status: Former     Current packs/day: 0.00     Average packs/day: 0.5 packs/day for 39.2 years (19.6 ttl pk-yrs)     Types: Cigarettes     Start date: 1983     Quit date: 2024     Years since quittin.5     Smokeless tobacco: Never   Substance Use Topics    Alcohol use: Yes     Alcohol/week: 1.0 standard drink of alcohol     Types: 1 Standard drinks or equivalent per week     Comment: MONTHLY             O:  BP (!) 160/78   Pulse 88   Wt 86.2 kg (190 lb)   BMI 33.66 kg/m  .       Constitutional/ general:  Pt is in no apparent distress, appears well-nourished.  Cooperative with history and physical exam.     Psych:  The patient answered questions appropriately.  Normal affect.  Seems to have reasonable expectations, in terms of treatment.     Eyes:  Visual scanning/ tracking without deficit.     Ears:  Response to auditory stimuli is normal.  negative hearing aid devices.  Auricles in proper alignment.     Lymphatic:  Popliteal lymph nodes not enlarged.     Lungs:  Non labored breathing, non labored speech. No cough.  No audible wheezing. Even, quiet breathing.       Vascular:  Pedal pulses are palpable bilaterally for both the DP and PT arteries.  CFT < 3 sec.  No edema.  Pedal hair growth noted.     Neuro:  Alert and oriented x 3.  Muscle strength weak.  Light touch diminished.    Derm: Normal texture and turgor.  No erythema, ecchymosis, or cyanosis.  No open lesions.     Musculoskeletal: Cavus foot with weightbearing bilateral.  No forefoot or rear foot deformities noted.  Left foot more inverted.  Prominent styloid process left foot.  Slight callus seen.  No skin breakdown.  No masses or bursa formation.  Edema noted on the dorsum of the left foot.  No ecchymosis or erythema.  No crepitus.  Slight pain on palpation medial tarsometatarsal joint.  Patient walking with a walker.    Radiographic Exam:  X-Ray Findings:  I personally reviewed the films  CT Foot Left w/o Contrast  Order: [id number]  Impression    1.  Small, acute appearing, nondisplaced fractures involving the proximal, dorsal bases of the second and third metatarsals as well as the distal, dorsal aspects of the medial, middle and lateral  cuneiforms.  2.  No subluxation is identified at the second TMT joint on this nonweightbearing study. MRI could be performed to evaluate the Lisfranc ligament as clinically indicated. MRI would also be more sensitive for nondisplaced fractures given the degree of osseous demineralization.  3.  There is some soft tissue edema over the dorsal aspect of the midfoot/hindfoot, without a discrete drainable collection.  Narrative    For Patients: As a result of the 21st Century Cures Act, medical imaging exams and procedure reports are released immediately into your electronic medical record. You may view this report before your referring provider. If you have questions, please contact your health care provider.    EXAM: CT FOOT LEFT WO  LOCATION: [facility]  DATE: 7/15/2024    INDICATION: free text)->Lisfranc injury.  COMPARISON: None.  TECHNIQUE: Noncontrast. Axial, sagittal and coronal thin-section reconstruction. Dose reduction techniques were used.    FINDINGS:    BONES:  -There is diffuse osseous demineralization.    There are small, acute appearing, nondisplaced fractures involving the proximal, dorsal bases of the second and third metatarsals as well as the distal, dorsal aspects of the medial and middle and lateral cuneiforms.    No subluxation is identified at the second TMT joint to suggest Lisfranc ligament injury on this nonweightbearing study.    Mild great toe interphalangeal degenerative arthrosis. A small ossific fragment along the lateral aspect of the navicular is favored to be chronic.    SOFT TISSUES:  -There is some soft tissue edema over the dorsal aspect of the midfoot/hindfoot, without a drainable fluid collection.  Images on Order [id number]    Scan on 7/15/2024: CT FOOT LEFT WO        Exam End: 07/15/24  8:13 AM    Specimen Collected: 07/15/24  8:13 AM Last Resulted: 07/15/24  9:38 AM       Impression    No fracture or degenerative changes. Osteopenia. Tiny plantar calcaneal spur. Ankle  mortise is intact.  Narrative    For Patients: As a result of the 21st Century Cures Act, medical imaging exams and procedure reports are released immediately into your electronic medical record. You may view this report before your referring provider. If you have questions, please contact your health care provider.    [facility]  XR FOOT 3 VIEWS LEFT, XR ANKLE 3 VIEWS LEFT  7/15/2024 7:37 AM CDT               Component  Ref Range & Units 1 mo ago 3 yr ago 5 yr ago    Hemoglobin A1C  0.0 - 5.6 % 5.1 4.9 R 5.0 R                  Component  Ref Range & Units 1 mo ago 5 mo ago    Vitamin D, Total (25-Hydroxy)  20 - 50 ng/mL 23 19 Low  CM              A:    Limb-girdle muscular dystrophy  Left styloid process pain  Left foot numerous fractures    Plan:  X-rays, CT scan results reviewed.  Reviewed recent labs.  Reviewed recent notes regarding her injury.  Reviewed recent neurology note.  Discussed numerous fractures at Lisfranc's joint.  There is no subluxation.  Discussed could get MRI to evaluate Lisfranc's ligament.  Since decreased sensation and walking with AFOs we will continue to treat conservatively.  Will continue with cam walker.  Explained cause of styloid process pain.  Discussed no new masses.  Explained bone is always been present but now under more pressure.  Wrote prescription for new AFO to keep this offloaded.  Watch feet carefully for any breakdown in skin or signs of infection.  RTC as needed.  Thank you for allowing me participate in the care of this patient.        Dann Jefferson, YULIANA, FACFAS

## 2024-08-03 DIAGNOSIS — B00.9 HERPES SIMPLEX TYPE 1 INFECTION: ICD-10-CM

## 2024-08-05 RX ORDER — ACYCLOVIR 400 MG/1
TABLET ORAL
Qty: 30 TABLET | Refills: 2 | OUTPATIENT
Start: 2024-08-05

## 2024-08-18 ENCOUNTER — HEALTH MAINTENANCE LETTER (OUTPATIENT)
Age: 54
End: 2024-08-18

## 2024-08-20 ENCOUNTER — TELEPHONE (OUTPATIENT)
Dept: FAMILY MEDICINE | Facility: CLINIC | Age: 54
End: 2024-08-20
Payer: COMMERCIAL

## 2024-08-20 NOTE — TELEPHONE ENCOUNTER
Patient Quality Outreach    Patient is due for the following:   Physical Preventive Adult Physical      Topic Date Due    Hepatitis B Vaccine (1 of 3 - 19+ 3-dose series) Never done    COVID-19 Vaccine (5 - 2023-24 season) 09/01/2023       Next Steps:   Schedule a Adult Preventative    Type of outreach:    Sent [employer] message.      Questions for provider review:               Sydnee Rojas MA

## 2024-09-13 DIAGNOSIS — Z76.0 ENCOUNTER FOR MEDICATION REFILL: ICD-10-CM

## 2024-09-13 RX ORDER — LEVOTHYROXINE SODIUM 150 UG/1
150 TABLET ORAL DAILY
Qty: 90 TABLET | Refills: 0 | Status: SHIPPED | OUTPATIENT
Start: 2024-09-13

## 2024-11-14 ENCOUNTER — MYC MEDICAL ADVICE (OUTPATIENT)
Dept: FAMILY MEDICINE | Facility: CLINIC | Age: 54
End: 2024-11-14
Payer: COMMERCIAL

## 2024-11-14 ENCOUNTER — E-VISIT (OUTPATIENT)
Dept: FAMILY MEDICINE | Facility: CLINIC | Age: 54
End: 2024-11-14
Payer: COMMERCIAL

## 2024-11-14 DIAGNOSIS — E06.3 HYPOTHYROIDISM DUE TO HASHIMOTO'S THYROIDITIS: Primary | ICD-10-CM

## 2024-11-14 NOTE — TELEPHONE ENCOUNTER
Provider E-Visit time total (minutes): 7 minutes    History of hypothyroidism.  Patient has been having new fatigue issues.  Needs to be seen in clinic for any further workup beyond today's labs.    Larry Schneider PA-C

## 2024-11-17 ENCOUNTER — LAB (OUTPATIENT)
Dept: LAB | Facility: CLINIC | Age: 54
End: 2024-11-17
Payer: COMMERCIAL

## 2024-11-17 DIAGNOSIS — E06.3 HYPOTHYROIDISM DUE TO HASHIMOTO'S THYROIDITIS: ICD-10-CM

## 2024-11-17 PROCEDURE — 84439 ASSAY OF FREE THYROXINE: CPT

## 2024-11-17 PROCEDURE — 82607 VITAMIN B-12: CPT

## 2024-11-17 PROCEDURE — 82306 VITAMIN D 25 HYDROXY: CPT

## 2024-11-17 PROCEDURE — 84443 ASSAY THYROID STIM HORMONE: CPT

## 2024-11-17 PROCEDURE — 36415 COLL VENOUS BLD VENIPUNCTURE: CPT

## 2024-11-17 PROCEDURE — 80053 COMPREHEN METABOLIC PANEL: CPT

## 2024-11-18 LAB
ALBUMIN SERPL BCG-MCNC: 4.4 G/DL (ref 3.5–5.2)
ALP SERPL-CCNC: 84 U/L (ref 40–150)
ALT SERPL W P-5'-P-CCNC: 159 U/L (ref 0–50)
ANION GAP SERPL CALCULATED.3IONS-SCNC: 11 MMOL/L (ref 7–15)
AST SERPL W P-5'-P-CCNC: 137 U/L (ref 0–45)
BILIRUB SERPL-MCNC: 0.2 MG/DL
BUN SERPL-MCNC: 7.6 MG/DL (ref 6–20)
CALCIUM SERPL-MCNC: 9.6 MG/DL (ref 8.8–10.4)
CHLORIDE SERPL-SCNC: 104 MMOL/L (ref 98–107)
CREAT SERPL-MCNC: 0.39 MG/DL (ref 0.51–0.95)
EGFRCR SERPLBLD CKD-EPI 2021: >90 ML/MIN/1.73M2
GLUCOSE SERPL-MCNC: 94 MG/DL (ref 70–99)
HCO3 SERPL-SCNC: 26 MMOL/L (ref 22–29)
POTASSIUM SERPL-SCNC: 4.1 MMOL/L (ref 3.4–5.3)
PROT SERPL-MCNC: 6.9 G/DL (ref 6.4–8.3)
SODIUM SERPL-SCNC: 141 MMOL/L (ref 135–145)
T4 FREE SERPL-MCNC: 1.3 NG/DL (ref 0.9–1.7)
TSH SERPL DL<=0.005 MIU/L-ACNC: 5.92 UIU/ML (ref 0.3–4.2)
VIT B12 SERPL-MCNC: 441 PG/ML (ref 232–1245)
VIT D+METAB SERPL-MCNC: 42 NG/ML (ref 20–50)

## 2024-12-10 DIAGNOSIS — Z76.0 ENCOUNTER FOR MEDICATION REFILL: ICD-10-CM

## 2024-12-10 RX ORDER — LEVOTHYROXINE SODIUM 150 UG/1
150 TABLET ORAL DAILY
Qty: 60 TABLET | Refills: 0 | Status: SHIPPED | OUTPATIENT
Start: 2024-12-10

## 2025-01-21 SDOH — HEALTH STABILITY: PHYSICAL HEALTH: ON AVERAGE, HOW MANY MINUTES DO YOU ENGAGE IN EXERCISE AT THIS LEVEL?: 20 MIN

## 2025-01-21 SDOH — HEALTH STABILITY: PHYSICAL HEALTH: ON AVERAGE, HOW MANY DAYS PER WEEK DO YOU ENGAGE IN MODERATE TO STRENUOUS EXERCISE (LIKE A BRISK WALK)?: 0 DAYS

## 2025-01-21 ASSESSMENT — SOCIAL DETERMINANTS OF HEALTH (SDOH): HOW OFTEN DO YOU GET TOGETHER WITH FRIENDS OR RELATIVES?: TWICE A WEEK

## 2025-01-22 ENCOUNTER — OFFICE VISIT (OUTPATIENT)
Dept: FAMILY MEDICINE | Facility: CLINIC | Age: 55
End: 2025-01-22
Payer: COMMERCIAL

## 2025-01-22 ENCOUNTER — TELEPHONE (OUTPATIENT)
Dept: FAMILY MEDICINE | Facility: CLINIC | Age: 55
End: 2025-01-22

## 2025-01-22 VITALS
RESPIRATION RATE: 20 BRPM | HEIGHT: 63 IN | OXYGEN SATURATION: 96 % | DIASTOLIC BLOOD PRESSURE: 86 MMHG | WEIGHT: 199 LBS | BODY MASS INDEX: 35.26 KG/M2 | HEART RATE: 98 BPM | TEMPERATURE: 97.6 F | SYSTOLIC BLOOD PRESSURE: 152 MMHG

## 2025-01-22 DIAGNOSIS — R03.0 ELEVATED BLOOD PRESSURE READING WITHOUT DIAGNOSIS OF HYPERTENSION: ICD-10-CM

## 2025-01-22 DIAGNOSIS — Z13.1 SCREENING FOR DIABETES MELLITUS: ICD-10-CM

## 2025-01-22 DIAGNOSIS — Z86.69 HISTORY OF BELL'S PALSY: ICD-10-CM

## 2025-01-22 DIAGNOSIS — E66.812 CLASS 2 SEVERE OBESITY WITH SERIOUS COMORBIDITY AND BODY MASS INDEX (BMI) OF 35.0 TO 35.9 IN ADULT, UNSPECIFIED OBESITY TYPE (H): ICD-10-CM

## 2025-01-22 DIAGNOSIS — Z13.220 LIPID SCREENING: ICD-10-CM

## 2025-01-22 DIAGNOSIS — Z76.0 ENCOUNTER FOR MEDICATION REFILL: ICD-10-CM

## 2025-01-22 DIAGNOSIS — Z87.891 PERSONAL HISTORY OF TOBACCO USE: ICD-10-CM

## 2025-01-22 DIAGNOSIS — G71.039 LIMB-GIRDLE MUSCULAR DYSTROPHY (H): ICD-10-CM

## 2025-01-22 DIAGNOSIS — E55.9 VITAMIN D DEFICIENCY: ICD-10-CM

## 2025-01-22 DIAGNOSIS — Z00.00 ROUTINE GENERAL MEDICAL EXAMINATION AT A HEALTH CARE FACILITY: Primary | ICD-10-CM

## 2025-01-22 DIAGNOSIS — E06.3 HYPOTHYROIDISM DUE TO HASHIMOTO'S THYROIDITIS: ICD-10-CM

## 2025-01-22 DIAGNOSIS — E66.01 CLASS 2 SEVERE OBESITY WITH SERIOUS COMORBIDITY AND BODY MASS INDEX (BMI) OF 35.0 TO 35.9 IN ADULT, UNSPECIFIED OBESITY TYPE (H): ICD-10-CM

## 2025-01-22 DIAGNOSIS — B00.9 HERPES SIMPLEX TYPE 1 INFECTION: ICD-10-CM

## 2025-01-22 DIAGNOSIS — Z12.31 ENCOUNTER FOR SCREENING MAMMOGRAM FOR BREAST CANCER: ICD-10-CM

## 2025-01-22 DIAGNOSIS — E53.8 VITAMIN B12 DEFICIENCY (NON ANEMIC): ICD-10-CM

## 2025-01-22 LAB
ALBUMIN SERPL BCG-MCNC: 4.5 G/DL (ref 3.5–5.2)
ALP SERPL-CCNC: 77 U/L (ref 40–150)
ALT SERPL W P-5'-P-CCNC: 123 U/L (ref 0–50)
ANION GAP SERPL CALCULATED.3IONS-SCNC: 12 MMOL/L (ref 7–15)
AST SERPL W P-5'-P-CCNC: 96 U/L (ref 0–45)
BASOPHILS # BLD AUTO: 0 10E3/UL (ref 0–0.2)
BASOPHILS NFR BLD AUTO: 1 %
BILIRUB SERPL-MCNC: 0.3 MG/DL
BUN SERPL-MCNC: 10.4 MG/DL (ref 6–20)
CALCIUM SERPL-MCNC: 10.1 MG/DL (ref 8.8–10.4)
CHLORIDE SERPL-SCNC: 103 MMOL/L (ref 98–107)
CHOLEST SERPL-MCNC: 205 MG/DL
CREAT SERPL-MCNC: 0.41 MG/DL (ref 0.51–0.95)
EGFRCR SERPLBLD CKD-EPI 2021: >90 ML/MIN/1.73M2
EOSINOPHIL # BLD AUTO: 0.2 10E3/UL (ref 0–0.7)
EOSINOPHIL NFR BLD AUTO: 3 %
ERYTHROCYTE [DISTWIDTH] IN BLOOD BY AUTOMATED COUNT: 12.1 % (ref 10–15)
EST. AVERAGE GLUCOSE BLD GHB EST-MCNC: 100 MG/DL
FASTING STATUS PATIENT QL REPORTED: YES
FASTING STATUS PATIENT QL REPORTED: YES
FOLATE SERPL-MCNC: 8.2 NG/ML (ref 4.6–34.8)
GLUCOSE SERPL-MCNC: 99 MG/DL (ref 70–99)
HBA1C MFR BLD: 5.1 % (ref 0–5.6)
HCO3 SERPL-SCNC: 27 MMOL/L (ref 22–29)
HCT VFR BLD AUTO: 40.8 % (ref 35–47)
HDLC SERPL-MCNC: 83 MG/DL
HGB BLD-MCNC: 14 G/DL (ref 11.7–15.7)
IMM GRANULOCYTES # BLD: 0 10E3/UL
IMM GRANULOCYTES NFR BLD: 0 %
LDLC SERPL CALC-MCNC: 104 MG/DL
LYMPHOCYTES # BLD AUTO: 2.3 10E3/UL (ref 0.8–5.3)
LYMPHOCYTES NFR BLD AUTO: 44 %
MCH RBC QN AUTO: 31.3 PG (ref 26.5–33)
MCHC RBC AUTO-ENTMCNC: 34.3 G/DL (ref 31.5–36.5)
MCV RBC AUTO: 91 FL (ref 78–100)
MONOCYTES # BLD AUTO: 0.4 10E3/UL (ref 0–1.3)
MONOCYTES NFR BLD AUTO: 7 %
NEUTROPHILS # BLD AUTO: 2.4 10E3/UL (ref 1.6–8.3)
NEUTROPHILS NFR BLD AUTO: 46 %
NONHDLC SERPL-MCNC: 122 MG/DL
PLATELET # BLD AUTO: 410 10E3/UL (ref 150–450)
POTASSIUM SERPL-SCNC: 4.9 MMOL/L (ref 3.4–5.3)
PROT SERPL-MCNC: 7.3 G/DL (ref 6.4–8.3)
RBC # BLD AUTO: 4.48 10E6/UL (ref 3.8–5.2)
SODIUM SERPL-SCNC: 142 MMOL/L (ref 135–145)
TRIGL SERPL-MCNC: 88 MG/DL
TSH SERPL DL<=0.005 MIU/L-ACNC: 0.87 UIU/ML (ref 0.3–4.2)
VIT B12 SERPL-MCNC: 471 PG/ML (ref 232–1245)
VIT D+METAB SERPL-MCNC: 57 NG/ML (ref 20–50)
WBC # BLD AUTO: 5.3 10E3/UL (ref 4–11)

## 2025-01-22 PROCEDURE — 83036 HEMOGLOBIN GLYCOSYLATED A1C: CPT | Performed by: PHYSICIAN ASSISTANT

## 2025-01-22 PROCEDURE — 85025 COMPLETE CBC W/AUTO DIFF WBC: CPT | Performed by: PHYSICIAN ASSISTANT

## 2025-01-22 PROCEDURE — 36415 COLL VENOUS BLD VENIPUNCTURE: CPT | Performed by: PHYSICIAN ASSISTANT

## 2025-01-22 PROCEDURE — 84443 ASSAY THYROID STIM HORMONE: CPT | Performed by: PHYSICIAN ASSISTANT

## 2025-01-22 PROCEDURE — 82746 ASSAY OF FOLIC ACID SERUM: CPT | Performed by: PHYSICIAN ASSISTANT

## 2025-01-22 PROCEDURE — G0296 VISIT TO DETERM LDCT ELIG: HCPCS | Performed by: PHYSICIAN ASSISTANT

## 2025-01-22 PROCEDURE — 99396 PREV VISIT EST AGE 40-64: CPT | Performed by: PHYSICIAN ASSISTANT

## 2025-01-22 PROCEDURE — 80053 COMPREHEN METABOLIC PANEL: CPT | Performed by: PHYSICIAN ASSISTANT

## 2025-01-22 PROCEDURE — 80061 LIPID PANEL: CPT | Performed by: PHYSICIAN ASSISTANT

## 2025-01-22 PROCEDURE — 82607 VITAMIN B-12: CPT | Performed by: PHYSICIAN ASSISTANT

## 2025-01-22 PROCEDURE — G2211 COMPLEX E/M VISIT ADD ON: HCPCS | Performed by: PHYSICIAN ASSISTANT

## 2025-01-22 PROCEDURE — 82306 VITAMIN D 25 HYDROXY: CPT | Performed by: PHYSICIAN ASSISTANT

## 2025-01-22 PROCEDURE — 99214 OFFICE O/P EST MOD 30 MIN: CPT | Mod: 25 | Performed by: PHYSICIAN ASSISTANT

## 2025-01-22 RX ORDER — VITAMIN B COMPLEX
1 TABLET ORAL DAILY
Qty: 90 TABLET | Refills: 0 | Status: SHIPPED | OUTPATIENT
Start: 2025-01-22

## 2025-01-22 RX ORDER — LEVOTHYROXINE SODIUM 150 UG/1
150 TABLET ORAL DAILY
Qty: 90 TABLET | Refills: 3 | Status: SHIPPED | OUTPATIENT
Start: 2025-01-22

## 2025-01-22 RX ORDER — ACYCLOVIR 400 MG/1
400 TABLET ORAL 2 TIMES DAILY
Qty: 180 TABLET | Refills: 1 | Status: SHIPPED | OUTPATIENT
Start: 2025-01-22

## 2025-01-22 ASSESSMENT — PAIN SCALES - GENERAL: PAINLEVEL_OUTOF10: MILD PAIN (2)

## 2025-01-22 NOTE — PROGRESS NOTES
{PROVIDER CHARTING PREFERENCE:116173}    Subjective   Allison is a 54 year old, presenting for the following health issues:  Physical      1/22/2025     8:13 AM   Additional Questions   Roomed by timmy   Accompanied by self         1/22/2025   Forms   Any forms needing to be completed Yes         1/22/2025     8:13 AM   Patient Reported Additional Medications   Patient reports taking the following new medications see chart     HPI     {MA/LPN/RN Pre-Provider Visit Orders- hCG/UA/Strep (Optional):234787}  {SUPERLIST (Optional):944909}  {additonal problems for provider to add (Optional):002786}    {ROS Picklists (Optional):766918}      Objective    There were no vitals taken for this visit.  There is no height or weight on file to calculate BMI.  Physical Exam   {Exam List (Optional):498399}    {Diagnostic Test Results (Optional):091833}        Signed Electronically by: Larry Schneider PA-C  {Email feedback regarding this note to [email or web address]   :[id number]}

## 2025-01-22 NOTE — TELEPHONE ENCOUNTER
Prior Authorization Retail Medication Request    Medication/Dose: semaglutide-weight management (WEGOVY) 0.25 MG/0.5ML pen  Diagnosis and ICD code (if different than what is on RX):  Class 2 severe obesity with serious comorbidity and body mass index (BMI) of 35.0 to 35.9 in adult, unspecified obesity type (H) [E66.812, E66.01, Z68.35]   New/renewal/insurance change PA/secondary ins. PA:  Previously Tried and Failed:    Rationale:      Surescript TRX code-[id number]

## 2025-01-22 NOTE — PROGRESS NOTES
Preventive Care Visit  [facility]  Larry Schneider PA-C, Physician Assistant - Medical  Jan 22, 2025      Assessment & Plan     (Z00.00) Routine general medical examination at a health care facility  (primary encounter diagnosis)  Comment: Here for routine screening examination.   Plan: Comprehensive metabolic panel (BMP + Alb, Alk         Phos, ALT, AST, Total. Bili, TP), CBC with         platelets and differential          (E55.9) Vitamin D deficiency  Comment:  History of vitamin D deficiency.  Discussed recheck vitamin D levels recheck of labs  Plan: Vitamin D3 (CHOLECALCIFEROL) 25 mcg (1000         units) tablet, Vitamin D Deficiency          (Z13.1) Screening for diabetes mellitus  Comment: Discussed screening diabetes.  Plan: Hemoglobin A1c          (Z76.0) Encounter for medication refill  Comment: History of hypothyroidism.  Refill of medications provided.  Discussed recheck TSH today.  Plan: levothyroxine (SYNTHROID/LEVOTHROID) 150 MCG         tablet          (E06.3) Hypothyroidism due to Hashimoto's thyroiditis  Comment: History of hypothyroidism.  Recheck TSH.  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  Elevated blood pressure without diagnosis of hypertension.  Without starting phentermine today.  Patient will check cost of leg elevation  Plan: semaglutide-weight management (WEGOVY) 0.25         MG/0.5ML pen           (B00.9) Herpes simplex type 1 infection  Comment: Has been having more HSV flares.  Has been having nearly monthly.  Discussed suppressive therapy.  Plan: acyclovir (ZOVIRAX) 400 MG tablet          (Z12.31) Encounter for screening mammogram for breast  "cancer  Comment: Encounter for mammogram.  Plan: MA Screen Bilateral w/Mike          (Z87.361) Personal history of tobacco use  Comment: History of smoking 40-pack-year history.  Plan: Prof fee: Shared Decision Making for Lung         Cancer Screening, CT Chest Lung Cancer Scrn Low        Dose wo          (Z13.862) Lipid screening  Comment: Lipid screening fasting today.  History of vitamin B12 deficiency will recheck vitamin B12 today  Plan: Lipid panel reflex to direct LDL Fasting          (E53.8) Vitamin B12 deficiency (non anemic)  Comment:   Plan: Vitamin B12, Folate          (R03.0) Elevated blood pressure reading without diagnosis of hypertension  Comment: Follow-up in 2 to 4 weeks for blood pressure recheck.  Discussed potential for blood pressure medications pending results.    (Z86.69) History of Bell's palsy  Comment: Overall stable  The longitudinal plan of care for the diagnosis(es)/condition(s) as documented were addressed during this visit. Due to the added complexity in care, I will continue to support Allsion in the subsequent management and with ongoing continuity of care.      BMI  Estimated body mass index is 35.25 kg/m  as calculated from the following:    Height as of this encounter: 1.6 m (5' 3\").    Weight as of this encounter: 90.3 kg (199 lb).   Weight management plan: Reviewed medications for weight loss    Counseling  Appropriate preventive services were addressed with this patient via screening, questionnaire, or discussion as appropriate for fall prevention, nutrition, physical activity, Tobacco-use cessation, social engagement, weight loss and cognition.  Checklist reviewing preventive services available has been given to the patient.  Reviewed patient's diet, addressing concerns and/or questions.       Subjective   Allison is a 54 year old, presenting for the following:  Physical        1/22/2025     8:13 AM   Additional Questions   Roomed by timmy   Accompanied by self         " 1/22/2025   Forms   Any forms needing to be completed Yes         1/22/2025     8:13 AM   Patient Reported Additional Medications   Patient reports taking the following new medications see chart          HPI    Not smoking over the last year.     Weight issues.  Has been trying to lose weight. Phentermine last year slight success. GLP was not covered. Would like to retrial of phentermine. No palpitations or side effects with medication.  She notes significant improvement in her overall fatigue.     The patient has followed with Dr. Emily pagan for limb girdle muscular dystrophy. Increasing weakness over the last year.  Was advised to decrease overall workload and rest more frequently.     Ongoing fatigue over the last year  that has been worsening.  Did have improvement with phentermine.     2 years ago was found to have a cyst in the left breast.  Did have ultrasound that showed benign.  No recent mammogram.     Neurology [facility] for triptan     No family history of medullary thyroid cancer or pancreatitis.     Colonoscopy 1/30/2024 7 year follow up.     No family history of breast of colon cancer         1/21/2025   General Health   How would you rate your overall physical health? Good   Feel stress (tense, anxious, or unable to sleep) Only a little   (!) STRESS CONCERN      1/21/2025   Nutrition   Three or more servings of calcium each day? Yes   Diet: Regular (no restrictions)   How many servings of fruit and vegetables per day? 4 or more   How many sweetened beverages each day? 0-1         1/21/2025   Exercise   Days per week of moderate/strenous exercise 0 days   Average minutes spent exercising at this level 20 min   (!) EXERCISE CONCERN      1/21/2025   Social Factors   Frequency of gathering with friends or relatives Twice a week   Worry food won't last until get money to buy more No   Food not last or not have enough money for food? No   Do you have housing? (Housing is defined as stable permanent  housing and does not include staying ouside in a car, in a tent, in an abandoned building, in an overnight shelter, or couch-surfing.) Yes   Are you worried about losing your housing? No   Lack of transportation? No   Unable to get utilities (heat,electricity)? No         2025   Fall Risk   Reason Gait Speed Test Not Completed Patient declines   Reason for decline known disability          2025   Dental   Dentist two times every year? Yes         2025   TB Screening   Were you born outside of the [place]? No     Today's PHQ-2 Score:       2025     8:10 AM   PHQ-2 (  [employer])   Q1: Little interest or pleasure in doing things 0   Q2: Feeling down, depressed or hopeless 0   PHQ-2 Score 0    Q1: Little interest or pleasure in doing things Not at all   Q2: Feeling down, depressed or hopeless Not at all   PHQ-2 Score 0       Patient-reported           2025   Substance Use   Alcohol more than 3/day or more than 7/wk No   Do you use any other substances recreationally? No     Social History     Tobacco Use    Smoking status: Former     Current packs/day: 0.00     Average packs/day: 0.5 packs/day for 39.2 years (19.6 ttl pk-yrs)     Types: Cigarettes     Start date: 1983     Quit date: 2024     Years since quittin.0     Passive exposure: Never    Smokeless tobacco: Never   Vaping Use    Vaping status: Never Used   Substance Use Topics    Alcohol use: Yes     Alcohol/week: 1.0 standard drink of alcohol     Types: 1 Standard drinks or equivalent per week     Comment: MONTHLY    Drug use: Never         2023   LAST FHS-7 RESULTS   1st degree relative breast or ovarian cancer No   Any relative bilateral breast cancer No   Any male have breast cancer No   Any ONE woman have BOTH breast AND ovarian cancer No   Any woman with breast cancer before 50yrs No   2 or more relatives with breast AND/OR ovarian cancer No   2 or more relatives with breast AND/OR bowel cancer No        Mammogram  "Screening - Mammogram every 1-2 years updated in Health Maintenance based on mutual decision making        1/21/2025   STI Screening   New sexual partner(s) since last STI/HIV test? No     History of abnormal Pap smear: No - age 30- 64 PAP with HPV every 3 years recommended        Latest Ref Rng & Units 6/2/2023     8:40 AM   PAP / HPV   PAP  Negative for Intraepithelial Lesion or Malignancy (NILM)    HPV 16 DNA Negative Negative    HPV 18 DNA Negative Negative    Other HR HPV Negative Negative      ASCVD Risk   The 10-year ASCVD risk score (Ac HARDY, et al., 2019) is: 2.3%    Values used to calculate the score:      Age: 54 years      Sex: Female      Is Non- : No      Diabetic: No      Tobacco smoker: No      Systolic Blood Pressure: 158 mmHg      Is BP treated: No      HDL Cholesterol: 67 mg/dL      Total Cholesterol: 187 mg/dL       Reviewed and updated as needed this visit by Provider                    Past Medical History:   Diagnosis Date    Herpes     Hypothyroidism     Migraines      Past Surgical History:   Procedure Laterality Date    BIOPSY      COLONOSCOPY      COLONOSCOPY N/A 1/30/2024    Procedure: COLONOSCOPY, WITH POLYPECTOMY AND BIOPSY;  Surgeon: Jo Carroll DO;  Location: [facility] OR    COLONOSCOPY N/A 1/30/2024    Procedure: COLONOSCOPY, FLEXIBLE, WITH LESION REMOVAL USING SNARE;  Surgeon: Jo Carroll DO;  Location: [facility] OR    COLONOSCOPY WITH CO2 INSUFFLATION N/A 1/30/2024    Procedure: Colonoscopy with CO2 insufflation;  Surgeon: Jo Carroll DO;  Location: [facility] OR    GYN SURGERY           Review of Systems  Constitutional, neuro, ENT, endocrine, pulmonary, cardiac, gastrointestinal, genitourinary, musculoskeletal, integument and psychiatric systems are negative, except as otherwise noted.     Objective    Exam  BP (!) 158/94   Pulse 98   Temp 97.6  F (36.4  C) (Tympanic)   Resp 20   Ht 1.6 m (5' 3\")   Wt 90.3 kg (199 lb)   SpO2 96%   BMI 35.25 " "kg/m     Estimated body mass index is 35.25 kg/m  as calculated from the following:    Height as of this encounter: 1.6 m (5' 3\").    Weight as of this encounter: 90.3 kg (199 lb).    Physical Exam  GENERAL: alert and no distress  EYES: Eyes grossly normal to inspection, PERRL and conjunctivae and sclerae normal  HENT: normal cephalic/atraumatic, ear canals and TM's normal, nose and mouth without ulcers or lesions, oropharynx clear, oral mucous membranes moist, and flattening nasolabial fold on right.  NECK: no adenopathy, no asymmetry, masses, or scars  RESP: lungs clear to auscultation - no rales, rhonchi or wheezes  CV: regular rate and rhythm, normal S1 S2, no S3 or S4, no murmur, click or rub, no peripheral edema  ABDOMEN: soft, nontender, no hepatosplenomegaly, no masses and bowel sounds normal  MS: no gross musculoskeletal defects noted, no edema  SKIN: no suspicious lesions or rashes  NEURO: sensory exam grossly normal, mentation intact, and difficulty with standing and lower extremity strength decreased diffusely.  Flattening nasolabial fold on right with otherwise cranial nerves II through XII grossly intact.  PSYCH: mentation appears normal, affect normal/bright        Signed Electronically by: Larry Schneider PA-C    Lung Cancer Screening Shared Decision Making Visit     Caryn Elizalde, a 54 year old female, is eligible for lung cancer screening    History   Smoking Status    Former    Types: Cigarettes   Smokeless Tobacco    Never       I have discussed with patient the risks and benefits of screening for lung cancer with low-dose CT.     The risks include:    radiation exposure: one low dose chest CT has as much ionizing radiation as about 15 chest x-rays, or 6 months of background radiation living in [place]      false positives: most findings/nodules are NOT cancer, but some might still require additional diagnostic evaluation, including biopsy    over-diagnosis: some slow growing cancers " that might never have been clinically significant will be detected and treated unnecessarily     The benefit of early detection of lung cancer is contingent upon adherence to annual screening or more frequent follow up if indicated.     Furthermore, to benefit from screening, Caryn must be willing and able to undergo diagnostic procedures, if indicated. Although no specific guide is available for determining severity of comorbidities, it is reasonable to withhold screening in patients who have greater mortality risk from other diseases.     We did discuss that the best way to prevent lung cancer is to not smoke.    Some patients may value a numeric estimation of lung cancer risk when evaluating if lung cancer screening is right for them, here is one calculator:    ShouldIScreen

## 2025-01-22 NOTE — PATIENT INSTRUCTIONS
Patient Education   Preventive Care Advice   This is general advice given by our system to help you stay healthy. However, your care team may have specific advice just for you. Please talk to your care team about your preventive care needs.  Nutrition  Eat 5 or more servings of fruits and vegetables each day.  Try wheat bread, brown rice and whole grain pasta (instead of white bread, rice, and pasta).  Get enough calcium and vitamin D. Check the label on foods and aim for 100% of the RDA (recommended daily allowance).  Lifestyle  Exercise at least 150 minutes each week  (30 minutes a day, 5 days a week).  Do muscle strengthening activities 2 days a week. These help control your weight and prevent disease.  No smoking.  Wear sunscreen to prevent skin cancer.  Have a dental exam and cleaning every 6 months.  Yearly exams  See your health care team every year to talk about:  Any changes in your health.  Any medicines your care team has prescribed.  Preventive care, family planning, and ways to prevent chronic diseases.  Shots (vaccines)   HPV shots (up to age 26), if you've never had them before.  Hepatitis B shots (up to age 59), if you've never had them before.  COVID-19 shot: Get this shot when it's due.  Flu shot: Get a flu shot every year.  Tetanus shot: Get a tetanus shot every 10 years.  Pneumococcal, hepatitis A, and RSV shots: Ask your care team if you need these based on your risk.  Shingles shot (for age 50 and up)  General health tests  Diabetes screening:  Starting at age 35, Get screened for diabetes at least every 3 years.  If you are younger than age 35, ask your care team if you should be screened for diabetes.  Cholesterol test: At age 39, start having a cholesterol test every 5 years, or more often if advised.  Bone density scan (DEXA): At age 50, ask your care team if you should have this scan for osteoporosis (brittle bones).  Hepatitis C: Get tested at least once in your life.  STIs (sexually  transmitted infections)  Before age 24: Ask your care team if you should be screened for STIs.  After age 24: Get screened for STIs if you're at risk. You are at risk for STIs (including HIV) if:  You are sexually active with more than one person.  You don't use condoms every time.  You or a partner was diagnosed with a sexually transmitted infection.  If you are at risk for HIV, ask about PrEP medicine to prevent HIV.  Get tested for HIV at least once in your life, whether you are at risk for HIV or not.  Cancer screening tests  Cervical cancer screening: If you have a cervix, begin getting regular cervical cancer screening tests starting at age 21.  Breast cancer scan (mammogram): If you've ever had breasts, begin having regular mammograms starting at age 40. This is a scan to check for breast cancer.  Colon cancer screening: It is important to start screening for colon cancer at age 45.  Have a colonoscopy test every 10 years (or more often if you're at risk) Or, ask your provider about stool tests like a FIT test every year or Cologuard test every 3 years.  To learn more about your testing options, visit:   .  For help making a decision, visit:   https://bit.ly/pa75511.  Prostate cancer screening test: If you have a prostate, ask your care team if a prostate cancer screening test (PSA) at age 55 is right for you.  Lung cancer screening: If you are a current or former smoker ages 50 to 80, ask your care team if ongoing lung cancer screenings are right for you.  For informational purposes only. Not to replace the advice of your health care provider. Copyright   2023 [facility]. All rights reserved. Clinically reviewed by the [facility] Transitions Program. Fuze [id number] - REV 01/24.  Preventing Falls: Care Instructions  Injuries and health problems such as trouble walking or poor eyesight can increase your risk of falling. So can some medicines. But there are things you can do to help  "prevent falls. You can exercise to get stronger. You can also arrange your home to make it safer.    Talk to your doctor about the medicines you take. Ask if any of them increase the risk of falls and whether they can be changed or stopped.   Try to exercise regularly. It can help improve your strength and balance. This can help lower your risk of falling.         Practice fall safety and prevention.   Wear low-heeled shoes that fit well and give your feet good support. Talk to your doctor if you have foot problems that make this hard.  Carry a cellphone or wear a medical alert device that you can use to call for help.  Use stepladders instead of chairs to reach high objects. Don't climb if you're at risk for falls. Ask for help, if needed.  Wear the correct eyeglasses, if you need them.        Make your home safer.   Remove rugs, cords, clutter, and furniture from walkways.  Keep your house well lit. Use night-lights in hallways and bathrooms.  Install and use sturdy handrails on stairways.  Wear nonskid footwear, even inside. Don't walk barefoot or in socks without shoes.        Be safe outside.   Use handrails, curb cuts, and ramps whenever possible.  Keep your hands free by using a shoulder bag or backpack.  Try to walk in well-lit areas. Watch out for uneven ground, changes in pavement, and debris.  Be careful in the winter. Walk on the grass or gravel when sidewalks are slippery. Use de-icer on steps and walkways. Add non-slip devices to shoes.    Put grab bars and nonskid mats in your shower or tub and near the toilet. Try to use a shower chair or bath bench when bathing.   Get into a tub or shower by putting in your weaker leg first. Get out with your strong side first. Have a phone or medical alert device in the bathroom with you.   Where can you learn more?  Go to https://www.Knowmiawise.net/patiented  Enter G117 in the search box to learn more about \"Preventing Falls: Care Instructions.\"  Current as of: " July 31, 2024  Content Version: 14.3    2024 [employer].   Care instructions adapted under license by your healthcare professional. If you have questions about a medical condition or this instruction, always ask your healthcare professional. [employer] disclaims any warranty or liability for your use of this information.       Lung Cancer Screening   Frequently Asked Questions  If you are at high-risk for lung cancer, getting screened with low-dose computed tomography (LDCT) every year can help save your life. This handout offers answers to some of the most common questions about lung cancer screening. If you have other questions, please call [phone] ([phone]).     What is it?  Lung cancer screening uses special X-ray technology to create an image of your lung tissue. The exam is quick and easy and takes less than 10 seconds. We don t give you any medicine or use any needles. You can eat before and after the exam. You don t need to change your clothes as long as the clothing on your chest doesn t contain metal. But, you do need to be able to hold your breath for at least 6 seconds during the exam.    What is the goal of lung cancer screening?  The goal of lung cancer screening is to save lives. Many times, lung cancer is not found until a person starts having physical symptoms. Lung cancer screening can help detect lung cancer in the earliest stages when it may be easier to treat.    Who should be screened for lung cancer?  We suggest lung cancer screening for anyone who is at high-risk for lung cancer. You are in the high-risk group if you:      are between the ages of 55 and 79, and    have smoked at least 1 pack of cigarettes a day for 20 or more years, and    still smoke or have quit within the past 15 years.    However, if you have a new cough or shortness of breath, you should talk to your doctor before being screened.    Why does it matter if I have symptoms?  Certain  symptoms can be a sign that you have a condition in your lungs that should be checked and treated by your doctor. These symptoms include fever, chest pain, a new or changing cough, shortness of breath that you have never felt before, coughing up blood or unexplained weight loss. Having any of these symptoms can greatly affect the results of lung cancer screening.       Should all smokers get an LDCT lung cancer screening exam?  It depends. Lung cancer screening is for a very specific group of men and women who have a history of heavy smoking over a long period of time (see  Who should be screened for lung cancer  above).  I am in the high-risk group, but have been diagnosed with cancer in the past. Is LDCT lung cancer screening right for me?  In some cases, you should not have LDCT lung screening, such as when your doctor is already following your cancer with CT scan studies. Your doctor will help you decide if LDCT lung screening is right for you.  Do I need to have a screening exam every year?  Yes. If you are in the high-risk group described earlier, you should get an LDCT lung cancer screening exam every year until you are 79, or are no longer willing or able to undergo screening and possible procedures to diagnose and treat lung cancer.  How effective is LDCT at preventing death from lung cancer?  Studies have shown that LDCT lung cancer screening can lower the risk of death from lung cancer by 20 percent in people who are at high-risk.  What are the risks?  There are some risks and limitations of LDCT lung cancer screening. We want to make sure you understand the risks and benefits, so please let us know if you have any questions. Your doctor may want to talk with you more about these risks.    Radiation exposure: As with any exam that uses radiation, there is a very small increased risk of cancer. The amount of radiation in LDCT is small--about the same amount a person would get from a mammogram. Your doctor  orders the exam when he or she feels the potential benefits outweigh the risks.    False negatives: No test is perfect, including LDCT. It is possible that you may have a medical condition, including lung cancer, that is not found during your exam. This is called a false negative result.    False positives and more testing: LDCT very often finds something in the lung that could be cancer, but in fact is not. This is called a false positive result. False positive tests often cause anxiety. To make sure these findings are not cancer, you may need to have more tests. These tests will be done only if you give us permission. Sometimes patients need a treatment that can have side effects, such as a biopsy. For more information on false positives, see  What can I expect from the results?     Findings not related to lung cancer: Your LDCT exam also takes pictures of areas of your body next to your lungs. In a very small number of cases, the CT scan will show an abnormal finding in one of these areas, such as your kidneys, adrenal glands, liver or thyroid. This finding may not be serious, but you may need more tests. Your doctor can help you decide what other tests you may need, if any.  What can I expect from the results?  About 1 out of 4 LDCT exams will find something that may need more tests. Most of the time, these findings are lung nodules. Lung nodules are very small collections of tissue in the lung. These nodules are very common, and the vast majority--more than 97 percent--are not cancer (benign). Most are normal lymph nodes or small areas of scarring from past infections.  But, if a small lung nodule is found to be cancer, the cancer can be cured more than 90 percent of the time. To know if the nodule is cancer, we may need to get more images before your next yearly screening exam. If the nodule has suspicious features (for example, it is large, has an odd shape or grows over time), we will refer you to a  specialist for further testing.  Will my doctor also get the results?  Yes. Your doctor will get a copy of your results.  Is it okay to keep smoking now that there s a cancer screening exam?  No. Tobacco is one of the strongest cancer-causing agents. It causes not only lung cancer, but other cancers and cardiovascular (heart) diseases as well. The damage caused by smoking builds over time. This means that the longer you smoke, the higher your risk of disease. While it is never too late to quit, the sooner you quit, the better.  Where can I find help to quit smoking?  The best way to prevent lung cancer is to stop smoking. If you have already quit smoking, congratulations and keep it up! For help on quitting smoking, please call Pricing Assistant at [phone]-QUITNOW ([phone]) or the [employer] at 1-970.245.6526 to find local resources near you.  One-on-one health coaching:  If you d prefer to work individually with a health care provider on tobacco cessation, we offer:      Medication Therapy Management:  Our specially trained pharmacists work closely with you and your doctor to help you quit smoking.  Call [phone] or [phone] (toll free).

## 2025-01-23 ENCOUNTER — PATIENT OUTREACH (OUTPATIENT)
Dept: CARE COORDINATION | Facility: CLINIC | Age: 55
End: 2025-01-23
Payer: COMMERCIAL

## 2025-01-25 NOTE — TELEPHONE ENCOUNTER
PA Initiation    Medication: WEGOVY 0.25 MG/0.5ML SC SOAJ  Insurance Company: Other (see comments)  Pharmacy Filling the Rx: [id number] - [place], [place] - [address]  Filling Pharmacy Phone: [phone]  Filling Pharmacy Fax: [phone]  Start Date: 1/25/2025

## 2025-01-26 NOTE — TELEPHONE ENCOUNTER
PRIOR AUTHORIZATION DENIED    Medication: WEGOVY 0.25 MG/0.5ML SC SOAJ    Insurance Company: Other (see comments)    Denial Date: 1/25/2025    Denial Reason(s): Excluded

## 2025-01-28 ENCOUNTER — MYC MEDICAL ADVICE (OUTPATIENT)
Dept: FAMILY MEDICINE | Facility: CLINIC | Age: 55
End: 2025-01-28

## 2025-01-28 NOTE — TELEPHONE ENCOUNTER
Please contact patient.    Wegovy not covered underneath your insurance plan.  Please contact your insurance to see if any weight loss medications are covered.    If any further questions or concerns please return to clinic.    Sincerely,   Larry Schneider PA-C

## 2025-02-27 ENCOUNTER — ANCILLARY PROCEDURE (OUTPATIENT)
Dept: MAMMOGRAPHY | Facility: CLINIC | Age: 55
End: 2025-02-27
Attending: PHYSICIAN ASSISTANT
Payer: COMMERCIAL

## 2025-02-27 ENCOUNTER — ANCILLARY PROCEDURE (OUTPATIENT)
Dept: CT IMAGING | Facility: CLINIC | Age: 55
End: 2025-02-27
Attending: PHYSICIAN ASSISTANT
Payer: COMMERCIAL

## 2025-02-27 DIAGNOSIS — Z12.31 ENCOUNTER FOR SCREENING MAMMOGRAM FOR BREAST CANCER: ICD-10-CM

## 2025-02-27 DIAGNOSIS — Z87.891 PERSONAL HISTORY OF TOBACCO USE: ICD-10-CM

## 2025-02-27 PROCEDURE — 77067 SCR MAMMO BI INCL CAD: CPT | Mod: GC | Performed by: RADIOLOGY

## 2025-02-27 PROCEDURE — 71271 CT THORAX LUNG CANCER SCR C-: CPT | Mod: GC | Performed by: RADIOLOGY

## 2025-02-27 PROCEDURE — 77063 BREAST TOMOSYNTHESIS BI: CPT | Mod: GC | Performed by: RADIOLOGY

## 2025-03-18 ENCOUNTER — OFFICE VISIT (OUTPATIENT)
Dept: FAMILY MEDICINE | Facility: CLINIC | Age: 55
End: 2025-03-18
Payer: COMMERCIAL

## 2025-03-18 VITALS
OXYGEN SATURATION: 99 % | SYSTOLIC BLOOD PRESSURE: 146 MMHG | WEIGHT: 201.4 LBS | HEIGHT: 63 IN | DIASTOLIC BLOOD PRESSURE: 86 MMHG | BODY MASS INDEX: 35.68 KG/M2 | HEART RATE: 89 BPM | TEMPERATURE: 98.1 F | RESPIRATION RATE: 16 BRPM

## 2025-03-18 DIAGNOSIS — I10 ESSENTIAL HYPERTENSION: Primary | ICD-10-CM

## 2025-03-18 PROCEDURE — 99213 OFFICE O/P EST LOW 20 MIN: CPT | Performed by: PHYSICIAN ASSISTANT

## 2025-03-18 PROCEDURE — 1126F AMNT PAIN NOTED NONE PRSNT: CPT | Performed by: PHYSICIAN ASSISTANT

## 2025-03-18 PROCEDURE — 3077F SYST BP >= 140 MM HG: CPT | Performed by: PHYSICIAN ASSISTANT

## 2025-03-18 PROCEDURE — 3079F DIAST BP 80-89 MM HG: CPT | Performed by: PHYSICIAN ASSISTANT

## 2025-03-18 RX ORDER — AMLODIPINE BESYLATE 5 MG/1
5 TABLET ORAL DAILY
Qty: 30 TABLET | Refills: 1 | Status: SHIPPED | OUTPATIENT
Start: 2025-03-18

## 2025-03-18 ASSESSMENT — PAIN SCALES - GENERAL: PAINLEVEL_OUTOF10: NO PAIN (0)

## 2025-03-18 NOTE — PROGRESS NOTES
"  Assessment & Plan     (I10) Essential hypertension  (primary encounter diagnosis)  Comment: History of hypertension.  Patient has had numerous visits with elevated blood pressure readings.  Patient is asymptomatic.  Not on any blood pressure medications.  Discussed trial of amlodipine with plans to follow-up in 2 to 4 weeks ancillary blood pressure recheck.  If not within goal discussed potential of adding losartan.  Reviewed risk profile of both medications.  Patient was comfortable with this plan.  Encouraged to continue with healthy lifestyle and exercise regimens.  Plan: amLODIPine (NORVASC) 5 MG tablet      Rolo Cooper is a 55 year old, presenting for the following health issues:  Follow Up and Hypertension      3/18/2025    11:51 AM   Additional Questions   Roomed by TAYLOR WEINBERG   Accompanied by SELF     History of Present Illness       Hypertension: She presents for follow up of hypertension.  She does not check blood pressure  regularly outside of the clinic. Outpatient blood pressures have not been over 140/90. She follows a low salt diet. She consumes 0 sweetened beverage(s) daily.She exercises with enough effort to increase her heart rate 9 or less minutes per day.  She exercises with enough effort to increase her heart rate 3 or less days per week.   She is taking medications regularly.      Has been trying to improve lifestyle.  Had stopped smoking but has had increased weight since then.  With this has noted increase in blood pressure.  Denies any chest pain, shortness of breath, leg swelling.  Does consume caffeine.  Has some sleep irregularities but declines any sleep evaluation at this time.       Review of Systems  Constitutional, HEENT, cardiovascular, pulmonary, gi and gu systems are negative, except as otherwise noted.      Objective    BP (!) 146/86   Pulse 89   Temp 98.1  F (36.7  C) (Tympanic)   Resp 16   Ht 1.6 m (5' 3\")   Wt 91.4 kg (201 lb 6.4 oz)   SpO2 99%   BMI 35.68 " kg/m    Body mass index is 35.68 kg/m .  Physical Exam   GENERAL: alert, no distress, and over weight  RESP: lungs clear to auscultation - no rales, rhonchi or wheezes  CV: regular rate and rhythm, normal S1 S2, no S3 or S4, no murmur, click or rub, no peripheral edema  MS: no gross musculoskeletal defects noted, no edema            Signed Electronically by: Larry Schneider PA-C

## 2025-04-23 DIAGNOSIS — G43.719 INTRACTABLE CHRONIC MIGRAINE WITHOUT AURA AND WITHOUT STATUS MIGRAINOSUS: ICD-10-CM

## 2025-04-23 RX ORDER — RIZATRIPTAN BENZOATE 10 MG/1
TABLET ORAL
Qty: 18 TABLET | Refills: 2 | Status: SHIPPED | OUTPATIENT
Start: 2025-04-23

## 2025-04-23 NOTE — TELEPHONE ENCOUNTER
Refill request for: rizatriptan 10mg   Directions: TAKE 1 TABLET BY MOUTH AS NEEDED FOR MIGRAINE, MAY REPEAT AFTER 2 HOURS AS NEEDED. MAX 2 DOSES IN 24 HOURS     LOV: 04/09/24  NOV: 07/28/25    18 tablets with 2 refills Medication T'd for review and signature    Martina Mcguire LPN on 4/23/2025 at 11:46 AM

## 2025-06-19 ENCOUNTER — OFFICE VISIT (OUTPATIENT)
Dept: FAMILY MEDICINE | Facility: CLINIC | Age: 55
End: 2025-06-19
Payer: COMMERCIAL

## 2025-06-19 VITALS
HEART RATE: 98 BPM | HEIGHT: 63 IN | TEMPERATURE: 98.4 F | BODY MASS INDEX: 35.44 KG/M2 | DIASTOLIC BLOOD PRESSURE: 80 MMHG | RESPIRATION RATE: 16 BRPM | WEIGHT: 200 LBS | OXYGEN SATURATION: 96 % | SYSTOLIC BLOOD PRESSURE: 122 MMHG

## 2025-06-19 DIAGNOSIS — I10 ESSENTIAL HYPERTENSION: ICD-10-CM

## 2025-06-19 RX ORDER — AMLODIPINE BESYLATE 5 MG/1
5 TABLET ORAL DAILY
Qty: 90 TABLET | Refills: 3 | Status: SHIPPED | OUTPATIENT
Start: 2025-06-19

## 2025-06-19 ASSESSMENT — PAIN SCALES - GENERAL: PAINLEVEL_OUTOF10: NO PAIN (0)

## 2025-06-19 NOTE — PROGRESS NOTES
"  Assessment & Plan     (I10) Essential hypertension  Comment: History of hypertension.  Blood pressure within goal.  Continue with amlodipine.  Refill provided.  Discussed with patient blood pressure with parameters.  If she is going less than 100/60 will discontinue medication.   Plan: amLODIPine (NORVASC) 5 MG tablet         Rolo Cooper is a 55 year old, presenting for the following health issues:  Follow Up and Recheck Medication      6/19/2025     9:48 AM   Additional Questions   Roomed by TAYLOR WEINBERG   Accompanied by SELF     History of Present Illness       Hypertension: She presents for follow up of hypertension.  She does check blood pressure  regularly outside of the clinic. Outpatient blood pressures have not been over 140/90. She follows a low salt diet.     She eats 4 or more servings of fruits and vegetables daily.She consumes 0 sweetened beverage(s) daily.She exercises with enough effort to increase her heart rate 10 to 19 minutes per day.  She exercises with enough effort to increase her heart rate 3 or less days per week.   She is taking medications regularly.        Patient with history of hypertension.  Has had weight gain secondary to limited activity as of late.  Has had more social stressors.  Blood pressure has been within goal.  Minimal lower extremity edema associated with amlodipine at 5 mg.  Has been tolerating medication without any major side effect.  Had recent passing of her mother.  Slightly increased stress related to this event.  Patient reports good family/social structure.      Review of Systems  Constitutional, HEENT, cardiovascular, pulmonary, gi and gu systems are negative, except as otherwise noted.      Objective    /80   Pulse 98   Temp 98.4  F (36.9  C) (Tympanic)   Resp 16   Ht 1.6 m (5' 3\")   Wt 90.7 kg (200 lb)   SpO2 96%   BMI 35.43 kg/m    Body mass index is 35.43 kg/m .  Physical Exam   GENERAL: alert and no distress  RESP: lungs clear to " auscultation - no rales, rhonchi or wheezes  CV: regular rates and rhythm, no murmur, click or rub, and no peripheral edema            Signed Electronically by: Larry Schneider PA-C

## 2025-06-23 ENCOUNTER — OFFICE VISIT (OUTPATIENT)
Dept: NEUROLOGY | Facility: CLINIC | Age: 55
End: 2025-06-23
Payer: COMMERCIAL

## 2025-06-23 ENCOUNTER — THERAPY VISIT (OUTPATIENT)
Dept: PHYSICAL THERAPY | Facility: CLINIC | Age: 55
End: 2025-06-23
Payer: COMMERCIAL

## 2025-06-23 VITALS
RESPIRATION RATE: 16 BRPM | OXYGEN SATURATION: 97 % | DIASTOLIC BLOOD PRESSURE: 84 MMHG | HEART RATE: 97 BPM | SYSTOLIC BLOOD PRESSURE: 144 MMHG

## 2025-06-23 DIAGNOSIS — G71.033: Primary | ICD-10-CM

## 2025-06-23 DIAGNOSIS — G71.039 LGMD (LIMB-GIRDLE MUSCULAR DYSTROPHY) (H): Primary | ICD-10-CM

## 2025-06-23 PROCEDURE — 99214 OFFICE O/P EST MOD 30 MIN: CPT | Performed by: PSYCHIATRY & NEUROLOGY

## 2025-06-23 PROCEDURE — 3079F DIAST BP 80-89 MM HG: CPT | Performed by: PSYCHIATRY & NEUROLOGY

## 2025-06-23 PROCEDURE — 3077F SYST BP >= 140 MM HG: CPT | Performed by: PSYCHIATRY & NEUROLOGY

## 2025-06-23 PROCEDURE — 1126F AMNT PAIN NOTED NONE PRSNT: CPT | Performed by: PSYCHIATRY & NEUROLOGY

## 2025-06-23 ASSESSMENT — PAIN SCALES - GENERAL: PAINLEVEL_OUTOF10: NO PAIN (0)

## 2025-06-23 NOTE — LETTER
6/23/2025       RE: Caryn Elizalde  [address]   [place] [address]     Dear Colleague,    Thank you for referring your patient, Caryn Elizalde, to the [facility] [place] at [facility]. Please see a copy of my visit note below.                 [facility]                  Muscular Dystrophy Clinic Note     Caryn Elizalde MRN# [id number]   YOB: 1970 Age: 55 year old      Date of Visit: Jun 23, 2025    Primary care provider: Larry Schneider    Refering physician:         Chief Complaint:     Follow up       History is obtained from the patient, family and medical record       Interval Change:      Caryn Elizalde is a 55 year old female was seen and examined at the muscular dystrophy clinic on Jun 23, 2025 for evaluation of previously diagnosed dystrophinopathy.  She continues to be ambulatory but is having increasing difficulties.    She falls occasionally usually due to catching her foot in a AFO braces made of carbon because it is heavier and requires larger size shoes.  She is able to stand up by pulling herself up. She is not able to stand up without holding for an object. She is interested in the exercise program and would like to see PT.  She has another type of brace which is not restrictive whe she needs to stand up. She is walking with a walker even around her living space. She feels more tightness in her hips.  Weaker in the upper extremities. Thus, she can not pour from a gallon size bottle but able to do a half galon. She is not limited in self care. She has no major aches or pain but does have some neck pain.   She has migraines usually associated with periods.   She is trying to lose some weight  She works 40-60 hrs She will try to reduce some hours.   She is not interested in a power mobility at this time but thinks it may be necessary in near future.    She  has been healthy otherwise and reports no new issues or concerns.,                Allergies:      Allergies   Allergen Reactions     Amoxicillin Rash     Other reaction(s): hives             Medications:     Prescription Medications as of 6/23/2025         Rx Number Disp Refills Start End Last Dispensed Date Next Fill Date Owning Pharmacy    acyclovir (ZOVIRAX) 400 MG tablet  180 tablet 1 1/22/2025 --   [facility]/pharmacy #[address] [place] [address]    Sig: Take 1 tablet (400 mg) by mouth 2 times daily.    Class: E-Prescribe    Route: Oral    amLODIPine (NORVASC) 5 MG tablet  90 tablet 3 6/19/2025 --   [facility]/pharmacy #[address] [place] [address]    Sig: Take 1 tablet (5 mg) by mouth daily.    Class: E-Prescribe    Route: Oral    Cyanocobalamin (B-12) 1000 MCG SUBL  150 tablet 1 6/12/2024 --   [facility]/pharmacy #[address] [place] [address] AT [facility]    Sig: Take 3 days weekly    Class: E-Prescribe    Notes to Pharmacy: Postoperative malabsorption following bariatric surgery.    ibuprofen (ADVIL/MOTRIN) 200 MG tablet  -- --  --       Sig: Take 1-2 tablets by mouth every 4 hours as needed    Class: Historical    Route: Oral    levothyroxine (SYNTHROID/LEVOTHROID) 150 MCG tablet  90 tablet 3 1/22/2025 --   [facility]/pharmacy #[address] [place] [address]    Sig: Take 1 tablet (150 mcg) by mouth daily.    Class: E-Prescribe    Route: Oral    rizatriptan (MAXALT) 10 MG tablet  18 tablet 2 4/23/2025 --   [facility]/pharmacy #[address] [place] [address]    Sig: TAKE 1 TABLET BY MOUTH AS NEEDED FOR MIGRAINE, MAY REPEAT AFTER 2 HOURS AS NEEDED. MAX 2 DOSES IN 24 HOURS    Class: E-Prescribe                  Review of Systems:   The 10 point Review of Systems is negative other than noted in the HPI          "Physical Exam:     BP (!) 144/84   Pulse 97   Resp 16   SpO2 97%    Physical Exam:   General: NAD  Neurologic:   Mental Status Exam: Alert, awake and easily engaged in interaction.   Cranial Nerves: PERRLA, EOMs intact, no nystagmus, facial movements symmetric,                 facial sensation intact to light touch, hearing intact to conversation, palate and uvula               rise symmetrically, no deviation in uvula or tongue, tongue midline and fully mobile                with no atrophy or fasciculations.    Motor: Normal tone in all four extremities, no atrophy or fasciculations.             Manual Motor Exam     Right Left A F  Right Left A F   Shoulder Abduction 4 4   Hip Flexion 2 3.5     Elbow Flexion 5 5   Knee Extension 2 2     Elbow Extension 5 5   Knee Flexion 2 2     Wrist Extension 5 5   Foot Dorsiflexion 0 0     Wrist flexion 5 5   Foot Plantar Flexion 0 0        Gait:Abnormal , walks with a walker.            Data:   CBC:  Lab Results   Component Value Date    WBC 5.3 01/22/2025     Lab Results   Component Value Date    RBC 4.48 01/22/2025     Lab Results   Component Value Date    HGB 14.0 01/22/2025     Lab Results   Component Value Date    HCT 40.8 01/22/2025     No components found for: \"MCT\"  Lab Results   Component Value Date    MCV 91 01/22/2025     Lab Results   Component Value Date    MCH 31.3 01/22/2025     Lab Results   Component Value Date    MCHC 34.3 01/22/2025     Lab Results   Component Value Date    RDW 12.1 01/22/2025     Lab Results   Component Value Date     01/22/2025       Last Basic Metabolic Panel:  Lab Results   Component Value Date     01/22/2025      Lab Results   Component Value Date    POTASSIUM 4.9 01/22/2025    POTASSIUM 4.2 05/27/2022     Lab Results   Component Value Date    CHLORIDE 103 01/22/2025    CHLORIDE 103 05/27/2022     Lab Results   Component Value Date    ERNESTINE 10.1 01/22/2025     Lab Results   Component Value Date    CO2 27 01/22/2025    CO2 " 26 05/27/2022     Lab Results   Component Value Date    BUN 10.4 01/22/2025    BUN 8 05/27/2022     Lab Results   Component Value Date    CR 0.41 01/22/2025     Lab Results   Component Value Date    GLC 99 01/22/2025    GLC 95 05/27/2022            Assessment and Recommendations:     Caryn Elizalde is a 55 year old female old female 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 She has progressive course with increasing difficulty in ambulation, high risk of falls and requirements of assistive devices for ambulation such as walker.   She has progressive foot varus deformity which is painful with limited correction with AFO.     Recommendations:  -Optimize AFOs  PT evaluation, she may benefit from a course of PT with focus on stretching and endurance.  -Return to clinic in 12 months cna be virtual.       I have spent at least 30 min on the date of the encounter in face-to-face evaluation, chart review, patient visit, review of tests, counseling the patient, documentation about the issues documented above. See note for details.     Sincerely,          Torey Diaz MD  Neurology and Neuromuscular medicine  [phone]             Again, thank you for allowing me to participate in the care of your patient.      Sincerely,    Torey Diaz MD

## 2025-06-23 NOTE — PATIENT INSTRUCTIONS
I will add some pictures of exercises for you to start working  I will ask Dr. BROWN for a pool therapy order  Darren's shoes may be helpful for your brace  Please reach out as you start to feel you are ready for a power mobility assessment.       Leora Sweeney DPT  Physical Therapist  [facility] [place] - [address]  [address] [place] [address]    Appointments: [phone]

## 2025-06-23 NOTE — PROGRESS NOTES
[facility]                  Muscular Dystrophy Clinic Note     Caryn Elizalde MRN# [id number]   YOB: 1970 Age: 55 year old      Date of Visit: Jun 23, 2025    Primary care provider: Larry Schneider    Refering physician:         Chief Complaint:     Follow up       History is obtained from the patient, family and medical record       Interval Change:      Caryn Elizalde is a 55 year old female was seen and examined at the muscular dystrophy clinic on Jun 23, 2025 for evaluation of previously diagnosed dystrophinopathy.  She continues to be ambulatory but is having increasing difficulties.    She falls occasionally usually due to catching her foot in a AFO braces made of carbon because it is heavier and requires larger size shoes.  She is able to stand up by pulling herself up. She is not able to stand up without holding for an object. She is interested in the exercise program and would like to see PT.  She has another type of brace which is not restrictive whe she needs to stand up. She is walking with a walker even around her living space. She feels more tightness in her hips.  Weaker in the upper extremities. Thus, she can not pour from a gallon size bottle but able to do a half galon. She is not limited in self care. She has no major aches or pain but does have some neck pain.   She has migraines usually associated with periods.   She is trying to lose some weight  She works 40-60 hrs She will try to reduce some hours.   She is not interested in a power mobility at this time but thinks it may be necessary in near future.    She has been healthy otherwise and reports no new issues or concerns.,                Allergies:      Allergies   Allergen Reactions    Amoxicillin Rash     Other reaction(s): hives             Medications:     Prescription Medications as of 6/23/2025         Rx Number Disp Refills Start End Last Dispensed Date Next Fill Date  Owning Pharmacy    acyclovir (ZOVIRAX) 400 MG tablet  180 tablet 1 1/22/2025 --   [facility]/pharmacy #[address] [place] [address]    Sig: Take 1 tablet (400 mg) by mouth 2 times daily.    Class: E-Prescribe    Route: Oral    amLODIPine (NORVASC) 5 MG tablet  90 tablet 3 6/19/2025 --   [facility]/pharmacy #[address] [place] [address]    Sig: Take 1 tablet (5 mg) by mouth daily.    Class: E-Prescribe    Route: Oral    Cyanocobalamin (B-12) 1000 MCG SUBL  150 tablet 1 6/12/2024 --   [facility]/pharmacy [address] [place] [address]    Sig: Take 3 days weekly    Class: E-Prescribe    Notes to Pharmacy: Postoperative malabsorption following bariatric surgery.    ibuprofen (ADVIL/MOTRIN) 200 MG tablet  -- --  --       Sig: Take 1-2 tablets by mouth every 4 hours as needed    Class: Historical    Route: Oral    levothyroxine (SYNTHROID/LEVOTHROID) 150 MCG tablet  90 tablet 3 1/22/2025 --   [facility]/pharmacy [address] [place] [address]    Sig: Take 1 tablet (150 mcg) by mouth daily.    Class: E-Prescribe    Route: Oral    rizatriptan (MAXALT) 10 MG tablet  18 tablet 2 4/23/2025 --   [facility]/pharmacy #[address] [place] [address] AT [facility]    Sig: TAKE 1 TABLET BY MOUTH AS NEEDED FOR MIGRAINE, MAY REPEAT AFTER 2 HOURS AS NEEDED. MAX 2 DOSES IN 24 HOURS    Class: E-Prescribe                  Review of Systems:   The 10 point Review of Systems is negative other than noted in the HPI         Physical Exam:     BP (!) 144/84   Pulse 97   Resp 16   SpO2 97%    Physical Exam:   General: NAD  Neurologic:   Mental Status Exam: Alert, awake and easily engaged in interaction.   Cranial Nerves: PERRLA, EOMs intact, no nystagmus, facial movements symmetric,                 facial sensation intact to light touch,  "hearing intact to conversation, palate and uvula               rise symmetrically, no deviation in uvula or tongue, tongue midline and fully mobile                with no atrophy or fasciculations.    Motor: Normal tone in all four extremities, no atrophy or fasciculations.             Manual Motor Exam     Right Left A F  Right Left A F   Shoulder Abduction 4 4   Hip Flexion 2 3.5     Elbow Flexion 5 5   Knee Extension 2 2     Elbow Extension 5 5   Knee Flexion 2 2     Wrist Extension 5 5   Foot Dorsiflexion 0 0     Wrist flexion 5 5   Foot Plantar Flexion 0 0        Gait:Abnormal , walks with a walker.            Data:   CBC:  Lab Results   Component Value Date    WBC 5.3 01/22/2025     Lab Results   Component Value Date    RBC 4.48 01/22/2025     Lab Results   Component Value Date    HGB 14.0 01/22/2025     Lab Results   Component Value Date    HCT 40.8 01/22/2025     No components found for: \"MCT\"  Lab Results   Component Value Date    MCV 91 01/22/2025     Lab Results   Component Value Date    MCH 31.3 01/22/2025     Lab Results   Component Value Date    MCHC 34.3 01/22/2025     Lab Results   Component Value Date    RDW 12.1 01/22/2025     Lab Results   Component Value Date     01/22/2025       Last Basic Metabolic Panel:  Lab Results   Component Value Date     01/22/2025      Lab Results   Component Value Date    POTASSIUM 4.9 01/22/2025    POTASSIUM 4.2 05/27/2022     Lab Results   Component Value Date    CHLORIDE 103 01/22/2025    CHLORIDE 103 05/27/2022     Lab Results   Component Value Date    ERNESTINE 10.1 01/22/2025     Lab Results   Component Value Date    CO2 27 01/22/2025    CO2 26 05/27/2022     Lab Results   Component Value Date    BUN 10.4 01/22/2025    BUN 8 05/27/2022     Lab Results   Component Value Date    CR 0.41 01/22/2025     Lab Results   Component Value Date    GLC 99 01/22/2025    GLC 95 05/27/2022            Assessment and Recommendations:     Caryn Elizalde is a 55 year " old female old female 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 She has progressive course with increasing difficulty in ambulation, high risk of falls and requirements of assistive devices for ambulation such as walker.   She has progressive foot varus deformity which is painful with limited correction with AFO.     Recommendations:  -Optimize AFOs  PT evaluation, she may benefit from a course of PT with focus on stretching and endurance.  -Return to clinic in 12 months cna be virtual.       I have spent at least 30 min on the date of the encounter in face-to-face evaluation, chart review, patient visit, review of tests, counseling the patient, documentation about the issues documented above. See note for details.     Sincerely,          Torey Diaz MD  Neurology and Neuromuscular medicine  [phone]

## 2025-06-23 NOTE — PROGRESS NOTES
PHYSICAL THERAPY EVALUATION  Type of Visit: Evaluation       Fall Risk Screen:  Have you fallen 2 or more times in the past year?: Yes  Have you fallen and had an injury in the past year?: No  Timed Up and Go score (seconds): 10 meter walk  Is patient receiving Physical Therapy Services?: Referral initiated    Subjective         Presenting condition or subjective complaint:   LGMD.   She reports a lot of stress recently and feels she has not been focusing on the things she needs to be. She has new AFO's and likes them for stability, however she has a harder time standing up from a chair in them and has had more tripping episodes in them. She feels part of it is the shoes she purchased to use with them that are too large. She is planning to find new shoes and go back to see the orthotist for adjustments that may need to be made.   She is having a harder time lifting her right leg up, and feels very tight in her hips, especially in her right hip.     Date of onset: 06/23/25    Relevant medical history:   hypothyroidism, migraines  Dates & types of surgery:      Prior diagnostic imaging/testing results:       Prior therapy history for the same diagnosis, illness or injury:        Prior Level of Function  Transfers: Independent with adaptive technique  Ambulation: Assistive equipment  ADL: Independent  IADL: Driving, Work    Living Environment  Social support:   lives alone  Type of home:   one level home  Stairs to enter the home:         Ramp:   yes  Stairs inside the home:         Help at home:    none  Equipment owned:   4WW, raised toilet seat, bilateral AFO's, grab bars in bathroom    Employment:      Hobbies/Interests:      Patient goals for therapy:   Understand some adaptations for exercises/stretches    Pain assessment: Pain denied     Objective    NEURO CLINIC EVALUATION    Others present at visit: none    Height/Weight: 5'3/ 200 lbs    Evaluation   Interview completed.     Range of motion: Tightness in  right>left hip internal rotators; Heelcord tightness bilaterally       Manual muscle testing:  Ankle DF 1/5, ankle PF 0, knee flex/ext 2/5, R hip flex 2/5, L hip flex 3/5     Gait:  Ambulates with 4WW and bilateral AFO's, WBOS, knees locked out in stance, foot inversion/supination bilaterally even in AFO's, increased lateral sway/path veering     10 meter walk: 7.01 sec     Cognition:  intact    Recommended Interventions: Therapeutic Procedures    Treatment provided this date:   Therapeutic Exercise: 10 minutes  -Instructed in the following exercises to address tightness/core weakness: heelcord stretch in sitting with use of a towel, supine butterfly stretch to stretch hip adductors, supine marching for core control and hip flexor ROM/strength, and supine hip adduction isometrics     -Recommended seeing outpatient therapy to address ROM and strength deficits, impaired mobility, and for fall prevention. She would like to start with pool therapy. Referral requested.     -Discussed concerns with ankle stability in old AFO's. She agrees that new AFO's provide much better ankle support but feels the shoes are the problem. Discussed shoe options that may work better with her new AFO's, including Darren's.    -Discussed benefits of powered mobility for safety/energy conservation/fall prevention. She recognizes that she will soon be needing power mobility, but is not ready yet and understands to reach out for a referral when ready.     Response to treatment/recommendations: Patient was receptive to all education and verbalized understanding    Goal attainment:  All goals met    Total Evaluation Time (Minutes): 19  Timed Code Treatment Minutes: 10  Total Treatment Time (sum of timed and untimed services): 29    Assessment & Plan   CLINICAL IMPRESSIONS  Medical Diagnosis: LGMD    Treatment Diagnosis: Force Production Deficit   Impression/Assessment: Patient is a 55 year old female with  complaints of progression of weakness  and difficulty walking and transferring.  The following significant findings have been identified: Decreased ROM/flexibility, Decreased strength, Impaired balance, Impaired gait, Impaired muscle performance, and Decreased activity tolerance. These impairments interfere with their ability to perform recreational activities, driving , household mobility, and community mobility as compared to previous level of function.     Clinical Decision Making (Complexity):  Clinical Presentation: Evolving/Changing  Clinical Presentation Rationale: based on medical and personal factors listed in PT evaluation  Clinical Decision Making (Complexity): Moderate complexity    PLAN OF CARE  Treatment Interventions:  Interventions: Therapeutic Exercise    Long Term Goals     PT Goal 1  Goal Identifier: Understand evaluation  Goal Description: Patient, family and/or caregiver will verbalize understanding of evaluation results and implications for functional performance.  Target Date: 06/23/25  Date Met: 06/23/25  PT Goal 2  Goal Identifier: Compensatory methods/equipment  Goal Description: Patient, family and/or caregiver will verbalize/demonstrate understanding of compensatory methods/equipment to enhance functional independence and safety  Target Date: 06/23/25  Date Met: 06/23/25  PT Goal 3  Goal Identifier: Positioning/equipment  Goal Description: Patient, family and/or caregiver will verbalize/demonstrate understanding of positioning techniques/equipment  Target Date: 06/23/25  Date Met: 06/23/25  PT Goal 4  Goal Identifier: HEP  Goal Description: Patient, family and/or caregiver will verbalize/demonstrate understanding of home program  Target Date: 06/23/25  Date Met: 06/23/25  PT Goal 5  Goal Identifier: Energy Management  Goal Description: Patient, family and/or caregiver will verbalize energy management techniques appropriate for satus and setting  Target Date: 06/23/25  Date Met: 06/23/25      Frequency of Treatment: 1  time  Duration of Treatment: 1 day    Recommended Referrals to Other Professionals: Outpatient PT for pool therapy  Education Assessment:   Learner/Method: Patient;Caregiver;Listening;Pictures/Video    Risks and benefits of evaluation/treatment have been explained.   Patient/Family/caregiver agrees with Plan of Care.     Evaluation Time:     PT Romeo, Moderate Complexity Minutes (49805): 19       Signing Clinician: Azeb Sweeney, PT

## 2025-07-01 DIAGNOSIS — G71.033: Primary | ICD-10-CM

## 2025-08-13 ASSESSMENT — HEADACHE IMPACT TEST (HIT 6)
HOW OFTEN HAVE YOU FELT FED UP OR IRRITATED BECAUSE OF YOUR HEADACHES: SOMETIMES
HOW OFTEN DO HEADACHES LIMIT YOUR DAILY ACTIVITIES: SOMETIMES
HOW OFTEN HAVE YOU FELT TOO TIRED TO WORK BECAUSE OF YOUR HEADACHES: SOMETIMES
HOW OFTEN DID HEADACHS LIMIT CONCENTRATION ON WORK OR DAILY ACTIVITY: SOMETIMES
WHEN YOU HAVE HEADACHES HOW OFTEN IS THE PAIN SEVERE: SOMETIMES
HIT6 TOTAL SCORE: 63
WHEN YOU HAVE A HEADACHE HOW OFTEN DO YOU WISH YOU COULD LIE DOWN: ALWAYS

## 2025-08-14 ENCOUNTER — OFFICE VISIT (OUTPATIENT)
Dept: NEUROLOGY | Facility: CLINIC | Age: 55
End: 2025-08-14
Payer: COMMERCIAL

## 2025-08-14 VITALS
SYSTOLIC BLOOD PRESSURE: 140 MMHG | WEIGHT: 202 LBS | HEIGHT: 63 IN | DIASTOLIC BLOOD PRESSURE: 82 MMHG | BODY MASS INDEX: 35.79 KG/M2 | HEART RATE: 81 BPM

## 2025-08-14 DIAGNOSIS — G43.719 INTRACTABLE CHRONIC MIGRAINE WITHOUT AURA AND WITHOUT STATUS MIGRAINOSUS: ICD-10-CM

## 2025-08-14 RX ORDER — RIZATRIPTAN BENZOATE 10 MG/1
TABLET ORAL
Qty: 18 TABLET | Refills: 11 | Status: SHIPPED | OUTPATIENT
Start: 2025-08-14

## (undated) DEVICE — PAD CHUX UNDERPAD 23X24" 7136

## (undated) DEVICE — KIT ENDO FIRST STEP DISINFECTANT 200ML W/POUCH EP-4

## (undated) DEVICE — PREP CHLORAPREP 26ML TINTED ORANGE  260815

## (undated) RX ORDER — FENTANYL CITRATE 50 UG/ML
INJECTION, SOLUTION INTRAMUSCULAR; INTRAVENOUS
Status: DISPENSED
Start: 2024-01-30

## (undated) RX ORDER — SIMETHICONE 40MG/0.6ML
SUSPENSION, DROPS(FINAL DOSAGE FORM)(ML) ORAL
Status: DISPENSED
Start: 2024-01-30